# Patient Record
Sex: FEMALE | Race: WHITE | NOT HISPANIC OR LATINO | Employment: FULL TIME | URBAN - METROPOLITAN AREA
[De-identification: names, ages, dates, MRNs, and addresses within clinical notes are randomized per-mention and may not be internally consistent; named-entity substitution may affect disease eponyms.]

---

## 2020-12-22 ENCOUNTER — TRANSFERRED RECORDS (OUTPATIENT)
Dept: HEALTH INFORMATION MANAGEMENT | Facility: CLINIC | Age: 30
End: 2020-12-22

## 2020-12-22 LAB
ABORH_EXT (HISTORICAL CONVERSION): NORMAL
ANTIBODY_EXT (HISTORICAL CONVERSION): NORMAL
HBSAG_EXT (HISTORICAL CONVERSION): NORMAL
HGB_EXT (HISTORICAL CONVERSION): 13.4
HIV_EXT: NORMAL
PLT_EXT - HISTORICAL: 385
RPR - HISTORICAL: NORMAL
RUBELLA_EXT (HISTORICAL CONVERSION): NORMAL

## 2020-12-23 LAB
ABO (EXTERNAL): NORMAL
HEPATITIS B SURFACE ANTIGEN (EXTERNAL): NONREACTIVE
HIV1+2 AB SERPL QL IA: NONREACTIVE
RH (EXTERNAL): POSITIVE
RUBELLA ANTIBODY IGG (EXTERNAL): POSITIVE
TREPONEMA PALLIDUM ANTIBODY (EXTERNAL): NONREACTIVE

## 2021-01-20 ENCOUNTER — AMBULATORY - HEALTHEAST (OUTPATIENT)
Dept: MATERNAL FETAL MEDICINE | Facility: HOSPITAL | Age: 31
End: 2021-01-20

## 2021-01-20 ENCOUNTER — RECORDS - HEALTHEAST (OUTPATIENT)
Dept: ADMINISTRATIVE | Facility: OTHER | Age: 31
End: 2021-01-20

## 2021-01-20 DIAGNOSIS — O26.90 PREGNANCY, ANTEPARTUM, COMPLICATIONS: ICD-10-CM

## 2021-02-24 ENCOUNTER — AMBULATORY - HEALTHEAST (OUTPATIENT)
Dept: MATERNAL FETAL MEDICINE | Facility: HOSPITAL | Age: 31
End: 2021-02-24

## 2021-03-01 ENCOUNTER — OFFICE VISIT - HEALTHEAST (OUTPATIENT)
Dept: MATERNAL FETAL MEDICINE | Facility: HOSPITAL | Age: 31
End: 2021-03-01

## 2021-03-01 ENCOUNTER — RECORDS - HEALTHEAST (OUTPATIENT)
Dept: ULTRASOUND IMAGING | Facility: HOSPITAL | Age: 31
End: 2021-03-01

## 2021-03-01 ENCOUNTER — RECORDS - HEALTHEAST (OUTPATIENT)
Dept: ADMINISTRATIVE | Facility: OTHER | Age: 31
End: 2021-03-01

## 2021-03-01 DIAGNOSIS — O26.90 PREGNANCY RELATED CONDITIONS, UNSPECIFIED, UNSPECIFIED TRIMESTER: ICD-10-CM

## 2021-03-01 DIAGNOSIS — O35.9XX0 SUSPECTED FETAL ANOMALY, ANTEPARTUM: ICD-10-CM

## 2021-03-01 DIAGNOSIS — O99.210 OBESITY AFFECTING PREGNANCY, ANTEPARTUM: ICD-10-CM

## 2021-03-17 ENCOUNTER — RECORDS - HEALTHEAST (OUTPATIENT)
Dept: ADMINISTRATIVE | Facility: OTHER | Age: 31
End: 2021-03-17

## 2021-03-29 ENCOUNTER — OFFICE VISIT - HEALTHEAST (OUTPATIENT)
Dept: MATERNAL FETAL MEDICINE | Facility: HOSPITAL | Age: 31
End: 2021-03-29

## 2021-03-29 ENCOUNTER — RECORDS - HEALTHEAST (OUTPATIENT)
Dept: ULTRASOUND IMAGING | Facility: HOSPITAL | Age: 31
End: 2021-03-29

## 2021-03-29 ENCOUNTER — RECORDS - HEALTHEAST (OUTPATIENT)
Dept: ADMINISTRATIVE | Facility: OTHER | Age: 31
End: 2021-03-29

## 2021-03-29 DIAGNOSIS — O40.2XX0 POLYHYDRAMNIOS IN SECOND TRIMESTER, SINGLE OR UNSPECIFIED FETUS: ICD-10-CM

## 2021-03-29 DIAGNOSIS — O99.210 OBESITY AFFECTING PREGNANCY, ANTEPARTUM: ICD-10-CM

## 2021-03-29 DIAGNOSIS — O99.210 OBESITY COMPLICATING PREGNANCY, UNSPECIFIED TRIMESTER: ICD-10-CM

## 2021-03-29 DIAGNOSIS — O35.9XX0 MATERNAL CARE FOR (SUSPECTED) FETAL ABNORMALITY AND DAMAGE, UNSPECIFIED, NOT APPLICABLE OR UNSPECIFIED: ICD-10-CM

## 2021-05-30 ENCOUNTER — RECORDS - HEALTHEAST (OUTPATIENT)
Dept: ADMINISTRATIVE | Facility: CLINIC | Age: 31
End: 2021-05-30

## 2021-06-02 ENCOUNTER — RECORDS - HEALTHEAST (OUTPATIENT)
Dept: ADMINISTRATIVE | Facility: CLINIC | Age: 31
End: 2021-06-02

## 2021-06-17 ENCOUNTER — HOSPITAL ENCOUNTER (OUTPATIENT)
Dept: OBGYN | Facility: HOSPITAL | Age: 31
Discharge: HOME | End: 2021-06-17
Attending: ADVANCED PRACTICE MIDWIFE | Admitting: ADVANCED PRACTICE MIDWIFE

## 2021-06-17 DIAGNOSIS — O13.3 GESTATIONAL HYPERTENSION, THIRD TRIMESTER: ICD-10-CM

## 2021-06-17 LAB
ABO/RH(D): NORMAL
ALT SERPL W P-5'-P-CCNC: 21 U/L (ref 0–45)
ANTIBODY SCREEN: NEGATIVE
APTT PPP: 28 SECONDS (ref 24–37)
AST SERPL W P-5'-P-CCNC: 14 U/L (ref 0–40)
CREAT SERPL-MCNC: 0.63 MG/DL (ref 0.6–1.1)
CREAT UR-MCNC: 71.3 MG/DL
ERYTHROCYTE [DISTWIDTH] IN BLOOD BY AUTOMATED COUNT: 12.8 % (ref 11–14.5)
GFR SERPL CREATININE-BSD FRML MDRD: >60 ML/MIN/1.73M2
HCT VFR BLD AUTO: 34.7 % (ref 35–47)
HGB BLD-MCNC: 11.6 G/DL (ref 12–16)
INR PPP: 0.98 (ref 0.9–1.1)
MCH RBC QN AUTO: 30.3 PG (ref 27–34)
MCHC RBC AUTO-ENTMCNC: 33.4 G/DL (ref 32–36)
MCV RBC AUTO: 91 FL (ref 80–100)
PLATELET # BLD AUTO: 292 THOU/UL (ref 140–440)
PMV BLD AUTO: 8.8 FL (ref 8.5–12.5)
PROTEIN, RANDOM URINE - HISTORICAL: 10 MG/DL
PROTEIN/CREAT RATIO, RANDOM UR: 0.14
RBC # BLD AUTO: 3.83 MILL/UL (ref 3.8–5.4)
URATE SERPL-MCNC: 4.7 MG/DL (ref 2–7.5)
WBC: 10.8 THOU/UL (ref 4–11)

## 2021-06-17 RX ORDER — LABETALOL 100 MG/1
100 TABLET, FILM COATED ORAL 2 TIMES DAILY
Qty: 60 TABLET | Refills: 1 | Status: SHIPPED | OUTPATIENT
Start: 2021-06-17 | End: 2021-08-03

## 2021-06-17 RX ORDER — CETIRIZINE HYDROCHLORIDE 10 MG/1
10 TABLET ORAL DAILY
Status: SHIPPED | COMMUNITY
Start: 2021-06-17 | End: 2021-08-03

## 2021-06-17 ASSESSMENT — MIFFLIN-ST. JEOR: SCORE: 1796.01

## 2021-06-21 ENCOUNTER — HOSPITAL ENCOUNTER (OUTPATIENT)
Dept: OBGYN | Facility: HOSPITAL | Age: 31
Discharge: HOME | End: 2021-06-21
Attending: ADVANCED PRACTICE MIDWIFE | Admitting: ADVANCED PRACTICE MIDWIFE

## 2021-06-22 ENCOUNTER — HOSPITAL ENCOUNTER (OUTPATIENT)
Dept: OBGYN | Facility: HOSPITAL | Age: 31
Discharge: HOME | End: 2021-06-22
Attending: ADVANCED PRACTICE MIDWIFE | Admitting: ADVANCED PRACTICE MIDWIFE

## 2021-06-26 ENCOUNTER — HEALTH MAINTENANCE LETTER (OUTPATIENT)
Age: 31
End: 2021-06-26

## 2021-07-01 ENCOUNTER — MEDICAL CORRESPONDENCE (OUTPATIENT)
Dept: HEALTH INFORMATION MANAGEMENT | Facility: CLINIC | Age: 31
End: 2021-07-01

## 2021-07-01 ENCOUNTER — TRANSFERRED RECORDS (OUTPATIENT)
Dept: HEALTH INFORMATION MANAGEMENT | Facility: CLINIC | Age: 31
End: 2021-07-01

## 2021-07-04 LAB — GROUP B STREPTOCOCCUS (EXTERNAL): NEGATIVE

## 2021-07-08 ENCOUNTER — DOCUMENTATION ONLY (OUTPATIENT)
Dept: ADMINISTRATIVE | Facility: OTHER | Age: 31
End: 2021-07-08

## 2021-07-08 NOTE — PROGRESS NOTES
This encounter was created as part of manual pregnancy episode data conversion for the single EHR project. The following information (where applicable) was manually abstracted from the [employer] Epic instance on July 8, 2021: pregnancy episode name/date, dating, episode encounter linking, pregravid weight, number of fetuses, and pregnancy overview and plan.     Elke Alcantara RN   Clinical Informatics

## 2021-07-11 ENCOUNTER — HOSPITAL ENCOUNTER (INPATIENT)
Facility: HOSPITAL | Age: 31
LOS: 5 days | Discharge: HOME | End: 2021-07-16
Attending: ADVANCED PRACTICE MIDWIFE | Admitting: ADVANCED PRACTICE MIDWIFE
Payer: COMMERCIAL

## 2021-07-11 PROBLEM — O13.9 GESTATIONAL HYPERTENSION: Status: ACTIVE | Noted: 2021-07-11

## 2021-07-11 LAB
ABO/RH(D): NORMAL
ALBUMIN MFR UR ELPH: 30.2 MG/DL
ALT SERPL W P-5'-P-CCNC: <9 U/L (ref 0–45)
ANTIBODY SCREEN: NEGATIVE
AST SERPL W P-5'-P-CCNC: 22 U/L (ref 0–40)
CREAT SERPL-MCNC: 1.48 MG/DL (ref 0.6–1.1)
CREAT UR-MCNC: 230 MG/DL
ERYTHROCYTE [DISTWIDTH] IN BLOOD BY AUTOMATED COUNT: 17 % (ref 10–15)
GFR SERPL CREATININE-BSD FRML MDRD: 47 ML/MIN/1.73M2
HCT VFR BLD AUTO: 23.4 % (ref 35–47)
HGB BLD-MCNC: 7.7 G/DL (ref 11.7–15.7)
MCH RBC QN AUTO: 32.2 PG (ref 26.5–33)
MCHC RBC AUTO-ENTMCNC: 32.9 G/DL (ref 31.5–36.5)
MCV RBC AUTO: 98 FL (ref 78–100)
PLATELET # BLD AUTO: 113 10E3/UL (ref 150–450)
PROT/CREAT 24H UR: 0.13 MG/MG CR
RBC # BLD AUTO: 2.39 10E6/UL (ref 3.8–5.2)
SARS-COV-2 RNA RESP QL NAA+PROBE: NEGATIVE
SPECIMEN EXPIRATION DATE: NORMAL
WBC # BLD AUTO: 4.8 10E3/UL (ref 4–11)

## 2021-07-11 PROCEDURE — 36415 COLL VENOUS BLD VENIPUNCTURE: CPT | Performed by: ADVANCED PRACTICE MIDWIFE

## 2021-07-11 PROCEDURE — 86900 BLOOD TYPING SEROLOGIC ABO: CPT | Performed by: ADVANCED PRACTICE MIDWIFE

## 2021-07-11 PROCEDURE — 86780 TREPONEMA PALLIDUM: CPT | Performed by: ADVANCED PRACTICE MIDWIFE

## 2021-07-11 PROCEDURE — 85027 COMPLETE CBC AUTOMATED: CPT | Performed by: ADVANCED PRACTICE MIDWIFE

## 2021-07-11 PROCEDURE — 250N000013 HC RX MED GY IP 250 OP 250 PS 637: Performed by: ADVANCED PRACTICE MIDWIFE

## 2021-07-11 PROCEDURE — 82565 ASSAY OF CREATININE: CPT | Performed by: ADVANCED PRACTICE MIDWIFE

## 2021-07-11 PROCEDURE — 120N000001 HC R&B MED SURG/OB

## 2021-07-11 PROCEDURE — 84460 ALANINE AMINO (ALT) (SGPT): CPT | Performed by: ADVANCED PRACTICE MIDWIFE

## 2021-07-11 PROCEDURE — 84450 TRANSFERASE (AST) (SGOT): CPT | Performed by: ADVANCED PRACTICE MIDWIFE

## 2021-07-11 PROCEDURE — 87635 SARS-COV-2 COVID-19 AMP PRB: CPT | Performed by: ADVANCED PRACTICE MIDWIFE

## 2021-07-11 PROCEDURE — 84156 ASSAY OF PROTEIN URINE: CPT | Performed by: ADVANCED PRACTICE MIDWIFE

## 2021-07-11 RX ORDER — OXYTOCIN/0.9 % SODIUM CHLORIDE 30/500 ML
340 PLASTIC BAG, INJECTION (ML) INTRAVENOUS CONTINUOUS PRN
Status: DISCONTINUED | OUTPATIENT
Start: 2021-07-11 | End: 2021-07-14

## 2021-07-11 RX ORDER — SODIUM CHLORIDE, SODIUM LACTATE, POTASSIUM CHLORIDE, CALCIUM CHLORIDE 600; 310; 30; 20 MG/100ML; MG/100ML; MG/100ML; MG/100ML
10-125 INJECTION, SOLUTION INTRAVENOUS CONTINUOUS
Status: DISCONTINUED | OUTPATIENT
Start: 2021-07-11 | End: 2021-07-16 | Stop reason: HOSPADM

## 2021-07-11 RX ORDER — MORPHINE SULFATE 10 MG/ML
10 INJECTION, SOLUTION INTRAMUSCULAR; INTRAVENOUS
Status: DISCONTINUED | OUTPATIENT
Start: 2021-07-11 | End: 2021-07-14

## 2021-07-11 RX ORDER — MAGNESIUM SULFATE HEPTAHYDRATE 500 MG/ML
10 INJECTION, SOLUTION INTRAMUSCULAR; INTRAVENOUS
Status: DISCONTINUED | OUTPATIENT
Start: 2021-07-11 | End: 2021-07-16 | Stop reason: HOSPADM

## 2021-07-11 RX ORDER — ASPIRIN 81 MG/1
81 TABLET, CHEWABLE ORAL AT BEDTIME
Status: ON HOLD | COMMUNITY
End: 2021-07-16

## 2021-07-11 RX ORDER — NALOXONE HYDROCHLORIDE 0.4 MG/ML
0.2 INJECTION, SOLUTION INTRAMUSCULAR; INTRAVENOUS; SUBCUTANEOUS
Status: DISCONTINUED | OUTPATIENT
Start: 2021-07-11 | End: 2021-07-14

## 2021-07-11 RX ORDER — CARBOPROST TROMETHAMINE 250 UG/ML
250 INJECTION, SOLUTION INTRAMUSCULAR
Status: DISCONTINUED | OUTPATIENT
Start: 2021-07-11 | End: 2021-07-14

## 2021-07-11 RX ORDER — LORAZEPAM 2 MG/ML
2 INJECTION INTRAMUSCULAR
Status: DISCONTINUED | OUTPATIENT
Start: 2021-07-11 | End: 2021-07-16 | Stop reason: HOSPADM

## 2021-07-11 RX ORDER — NALOXONE HYDROCHLORIDE 0.4 MG/ML
0.4 INJECTION, SOLUTION INTRAMUSCULAR; INTRAVENOUS; SUBCUTANEOUS
Status: DISCONTINUED | OUTPATIENT
Start: 2021-07-11 | End: 2021-07-13

## 2021-07-11 RX ORDER — OXYTOCIN 10 [USP'U]/ML
10 INJECTION, SOLUTION INTRAMUSCULAR; INTRAVENOUS ONCE
Status: DISCONTINUED | OUTPATIENT
Start: 2021-07-11 | End: 2021-07-14

## 2021-07-11 RX ORDER — MISOPROSTOL 200 UG/1
400 TABLET ORAL PRN
Status: DISCONTINUED | OUTPATIENT
Start: 2021-07-11 | End: 2021-07-13

## 2021-07-11 RX ORDER — ONDANSETRON 4 MG/1
4 TABLET, ORALLY DISINTEGRATING ORAL EVERY 6 HOURS PRN
Status: DISCONTINUED | OUTPATIENT
Start: 2021-07-11 | End: 2021-07-13

## 2021-07-11 RX ORDER — MISOPROSTOL 100 UG/1
25 TABLET ORAL
Status: COMPLETED | OUTPATIENT
Start: 2021-07-11 | End: 2021-07-12

## 2021-07-11 RX ORDER — KETOROLAC TROMETHAMINE 30 MG/ML
30 INJECTION, SOLUTION INTRAMUSCULAR; INTRAVENOUS
Status: DISCONTINUED | OUTPATIENT
Start: 2021-07-11 | End: 2021-07-11

## 2021-07-11 RX ORDER — PROCHLORPERAZINE 25 MG
25 SUPPOSITORY, RECTAL RECTAL EVERY 12 HOURS PRN
Status: DISCONTINUED | OUTPATIENT
Start: 2021-07-11 | End: 2021-07-13

## 2021-07-11 RX ORDER — PRENATAL VIT/IRON FUM/FOLIC AC 27MG-0.8MG
1 TABLET ORAL DAILY
COMMUNITY

## 2021-07-11 RX ORDER — LABETALOL 100 MG/1
100 TABLET, FILM COATED ORAL EVERY 12 HOURS SCHEDULED
Status: DISCONTINUED | OUTPATIENT
Start: 2021-07-11 | End: 2021-07-14

## 2021-07-11 RX ORDER — MAGNESIUM SULFATE HEPTAHYDRATE 40 MG/ML
2 INJECTION, SOLUTION INTRAVENOUS
Status: DISCONTINUED | OUTPATIENT
Start: 2021-07-11 | End: 2021-07-16 | Stop reason: HOSPADM

## 2021-07-11 RX ORDER — NALOXONE HYDROCHLORIDE 0.4 MG/ML
0.2 INJECTION, SOLUTION INTRAMUSCULAR; INTRAVENOUS; SUBCUTANEOUS
Status: DISCONTINUED | OUTPATIENT
Start: 2021-07-11 | End: 2021-07-13

## 2021-07-11 RX ORDER — IBUPROFEN 600 MG/1
600 TABLET, FILM COATED ORAL
Status: DISCONTINUED | OUTPATIENT
Start: 2021-07-11 | End: 2021-07-11

## 2021-07-11 RX ORDER — HYDROXYZINE HYDROCHLORIDE 25 MG/1
50 TABLET, FILM COATED ORAL
Status: DISCONTINUED | OUTPATIENT
Start: 2021-07-11 | End: 2021-07-14

## 2021-07-11 RX ORDER — LABETALOL 100 MG/1
100 TABLET, FILM COATED ORAL 2 TIMES DAILY WITH MEALS
Status: ON HOLD | COMMUNITY
End: 2021-07-19

## 2021-07-11 RX ORDER — NALOXONE HYDROCHLORIDE 0.4 MG/ML
0.4 INJECTION, SOLUTION INTRAMUSCULAR; INTRAVENOUS; SUBCUTANEOUS
Status: DISCONTINUED | OUTPATIENT
Start: 2021-07-11 | End: 2021-07-14

## 2021-07-11 RX ORDER — CETIRIZINE HYDROCHLORIDE 10 MG/1
10 TABLET ORAL AT BEDTIME
COMMUNITY
End: 2021-08-03

## 2021-07-11 RX ORDER — METHYLERGONOVINE MALEATE 0.2 MG/ML
200 INJECTION INTRAVENOUS
Status: DISCONTINUED | OUTPATIENT
Start: 2021-07-11 | End: 2021-07-14

## 2021-07-11 RX ORDER — OXYTOCIN/0.9 % SODIUM CHLORIDE 30/500 ML
100-340 PLASTIC BAG, INJECTION (ML) INTRAVENOUS ONCE
Status: DISCONTINUED | OUTPATIENT
Start: 2021-07-11 | End: 2021-07-14

## 2021-07-11 RX ORDER — ONDANSETRON 2 MG/ML
4 INJECTION INTRAMUSCULAR; INTRAVENOUS EVERY 6 HOURS PRN
Status: DISCONTINUED | OUTPATIENT
Start: 2021-07-11 | End: 2021-07-14

## 2021-07-11 RX ORDER — MISOPROSTOL 200 UG/1
800 TABLET ORAL PRN
Status: DISCONTINUED | OUTPATIENT
Start: 2021-07-11 | End: 2021-07-14

## 2021-07-11 RX ORDER — PROCHLORPERAZINE MALEATE 10 MG
10 TABLET ORAL EVERY 6 HOURS PRN
Status: DISCONTINUED | OUTPATIENT
Start: 2021-07-11 | End: 2021-07-13

## 2021-07-11 RX ORDER — METOCLOPRAMIDE 10 MG/1
10 TABLET ORAL EVERY 6 HOURS PRN
Status: DISCONTINUED | OUTPATIENT
Start: 2021-07-11 | End: 2021-07-14

## 2021-07-11 RX ORDER — OXYTOCIN 10 [USP'U]/ML
10 INJECTION, SOLUTION INTRAMUSCULAR; INTRAVENOUS PRN
Status: DISCONTINUED | OUTPATIENT
Start: 2021-07-11 | End: 2021-07-14

## 2021-07-11 RX ORDER — MAGNESIUM SULFATE 4 G/50ML
4 INJECTION INTRAVENOUS
Status: DISCONTINUED | OUTPATIENT
Start: 2021-07-11 | End: 2021-07-16 | Stop reason: HOSPADM

## 2021-07-11 RX ORDER — METOCLOPRAMIDE HYDROCHLORIDE 5 MG/ML
10 INJECTION INTRAMUSCULAR; INTRAVENOUS EVERY 6 HOURS PRN
Status: DISCONTINUED | OUTPATIENT
Start: 2021-07-11 | End: 2021-07-13

## 2021-07-11 RX ADMIN — HYDROXYZINE HYDROCHLORIDE 50 MG: 25 TABLET, FILM COATED ORAL at 21:45

## 2021-07-11 RX ADMIN — Medication 25 MCG: at 11:24

## 2021-07-11 RX ADMIN — Medication 25 MCG: at 13:27

## 2021-07-11 RX ADMIN — Medication 25 MCG: at 21:45

## 2021-07-11 RX ADMIN — Medication 25 MCG: at 23:37

## 2021-07-11 RX ADMIN — Medication 25 MCG: at 09:20

## 2021-07-11 RX ADMIN — Medication 25 MCG: at 19:42

## 2021-07-11 RX ADMIN — LABETALOL HYDROCHLORIDE 100 MG: 100 TABLET, FILM COATED ORAL at 19:43

## 2021-07-11 RX ADMIN — Medication 25 MCG: at 17:29

## 2021-07-11 RX ADMIN — Medication 25 MCG: at 15:32

## 2021-07-11 ASSESSMENT — ACTIVITIES OF DAILY LIVING (ADL)
FALL_HISTORY_WITHIN_LAST_SIX_MONTHS: NO
TOILETING_ISSUES: NO

## 2021-07-11 ASSESSMENT — MIFFLIN-ST. JEOR: SCORE: 1788.64

## 2021-07-11 NOTE — PROGRESS NOTES
S: Sola is resting comfortably in bed. She denies headache, vision changes, URQ/epigastric pain.    /75   Pulse 100   Temp 98.3  F (36.8  C) (Oral)   Resp 18   Ht 1.524 m (5')   Wt 115.2 kg (254 lb)   LMP 10/25/2020   SpO2 97%   BMI 49.61 kg/m    Recent Results (from the past 24 hour(s))   Protein  random urine with Creat Ratio    Collection Time: 07/11/21  8:51 AM   Result Value Ref Range    Total Protein Urine mg/dL 30.2 mg/dL    Total Protein UR MG/MG CR 0.13 mg/mg Cr    Creatinine Urine mg/dL 230 mg/dL   SARS-COV2 (COVID-19) Virus RT-PCR    Collection Time: 07/11/21  8:53 AM    Specimen: Nasopharyngeal; Swab   Result Value Ref Range    SARS CoV2 PCR Negative Negative   CBC with platelets    Collection Time: 07/11/21  9:24 AM   Result Value Ref Range    WBC Count 4.8 4.0 - 11.0 10e3/uL    RBC Count 2.39 (L) 3.80 - 5.20 10e6/uL    Hemoglobin 7.7 (L) 11.7 - 15.7 g/dL    Hematocrit 23.4 (L) 35.0 - 47.0 %    MCV 98 78 - 100 fL    MCH 32.2 26.5 - 33.0 pg    MCHC 32.9 31.5 - 36.5 g/dL    RDW 17.0 (H) 10.0 - 15.0 %    Platelet Count 113 (L) 150 - 450 10e3/uL   AST    Collection Time: 07/11/21  9:24 AM   Result Value Ref Range    AST 22 0 - 40 U/L   ALT    Collection Time: 07/11/21  9:24 AM   Result Value Ref Range    ALT <9 0 - 45 U/L   Creatinine    Collection Time: 07/11/21  9:24 AM   Result Value Ref Range    Creatinine 1.48 (H) 0.60 - 1.10 mg/dL    GFR Estimate 47 (L) >60 mL/min/1.73m2   Adult Type and Screen    Collection Time: 07/11/21  9:24 AM   Result Value Ref Range    ABO/RH(D) A POS     Antibody Screen Negative Negative    SPECIMEN EXPIRATION DATE [id number]        A: IOL for medication-controlled gHTN and polyhydramnios  Low hemoglobin, hematocrit, and platelets; elevated creatinine, lower GFR    P: Reviewed above with Dr. Whaley, nothing further at this time, will carry on with cervical ripening and induction of labor. Will redraw HELLP panel tomorrow and monitor.    Dr. Whaley remains  available for consultation and collaboration as needed.    EVELIN Hawkins CNM

## 2021-07-11 NOTE — PROGRESS NOTES
Data: Patient admitted to room 14 at 0745. Patient is a . Prenatal record reviewed.   OB History    Para Term  AB Living   1 0 0 0 0 0   SAB TAB Ectopic Multiple Live Births   0 0 0 0 0      # Outcome Date GA Lbr Drake/2nd Weight Sex Delivery Anes PTL Lv   1 Current            .  Medical History:   Past Medical History:   Diagnosis Date     Gestational hypertension      PCOS (polycystic ovarian syndrome)    .  Gestational age 37w0d. Vital signs per doc flowsheet. Fetal movement present. Patient reports Induction Of Labor (for hypertension and hydramnios)   as reason for admission. Support persons  Francis Akers present.  Action: Verbal consent for EFM, external fetal monitors applied. Admission assessment completed. Patient and support persons educated on labor process. Patient instructed to report change in fetal movement, contractions, vaginal leaking of fluid or bleeding, abdominal pain, or any concerns related to the pregnancy to her nurse/physician. Patient oriented to room, call light in reach.   Response: JOSÉ ANTONIO Barber informed of patient's arrival. Plan per provider is oral cytotec for cervical ripening. Patient verbalized understanding of education and verbalized agreement with plan. Patient coping with labor, contractions mild and patient is unaware of them at this time.

## 2021-07-11 NOTE — PLAN OF CARE
Problem: Delayed Labor Progression (Labor)  Goal: Effective Progression to Delivery  Outcome: Improving   Oral Cytotec for induction of labor to achieve solis score greater than or equal to 8

## 2021-07-11 NOTE — PROGRESS NOTES
Elvira Melendez CNM in department.  Reviewed the following lab results:    Results for CLAU BHATT (MRN [id number]) as of 7/11/2021 13:53   Ref. Range 7/11/2021 09:24   WBC Latest Ref Range: 4.0 - 11.0 10e3/uL 4.8   Hemoglobin Latest Ref Range: 11.7 - 15.7 g/dL 7.7 (L)   Hematocrit Latest Ref Range: 35.0 - 47.0 % 23.4 (L)   Platelet Count Latest Ref Range: 150 - 450 10e3/uL 113 (L)   RBC Count Latest Ref Range: 3.80 - 5.20 10e6/uL 2.39 (L)   MCV Latest Ref Range: 78 - 100 fL 98   MCH Latest Ref Range: 26.5 - 33.0 pg 32.2   MCHC Latest Ref Range: 31.5 - 36.5 g/dL 32.9   RDW Latest Ref Range: 10.0 - 15.0 % 17.0 (H)   Creatinine Latest Ref Range: 0.60 - 1.10 mg/dL 1.48 (H)   GFR Estimate Latest Ref Range: >60 mL/min/1.73m2 47 (L)   ALT Latest Ref Range: 0 - 45 U/L <9   AST Latest Ref Range: 0 - 40 U/L 22     Results for CLAU BHATT (MRN [id number]) as of 7/11/2021 13:53   Ref. Range 7/11/2021 08:51   Total Protein UR MG/MG CR Latest Units: mg/mg Cr 0.13       No new orders at this time.

## 2021-07-12 LAB
ALBUMIN MFR UR ELPH: 8.8 MG/DL
ALT SERPL W P-5'-P-CCNC: 12 U/L (ref 0–45)
AST SERPL W P-5'-P-CCNC: 12 U/L (ref 0–40)
CREAT SERPL-MCNC: 0.64 MG/DL (ref 0.6–1.1)
CREAT UR-MCNC: 81 MG/DL
ERYTHROCYTE [DISTWIDTH] IN BLOOD BY AUTOMATED COUNT: 12.8 % (ref 10–15)
GFR SERPL CREATININE-BSD FRML MDRD: >90 ML/MIN/1.73M2
GROUP B STREPTOCOCCUS (EXTERNAL): NORMAL
HCT VFR BLD AUTO: 34.8 % (ref 35–47)
HGB BLD-MCNC: 11.6 G/DL (ref 11.7–15.7)
MCH RBC QN AUTO: 30.5 PG (ref 26.5–33)
MCHC RBC AUTO-ENTMCNC: 33.3 G/DL (ref 31.5–36.5)
MCV RBC AUTO: 92 FL (ref 78–100)
PLATELET # BLD AUTO: 279 10E3/UL (ref 150–450)
PROT/CREAT 24H UR: 0.11 MG/MG CR
RBC # BLD AUTO: 3.8 10E6/UL (ref 3.8–5.2)
T PALLIDUM AB SER QL: NEGATIVE
WBC # BLD AUTO: 11 10E3/UL (ref 4–11)

## 2021-07-12 PROCEDURE — 250N000013 HC RX MED GY IP 250 OP 250 PS 637: Performed by: MIDWIFE

## 2021-07-12 PROCEDURE — 84460 ALANINE AMINO (ALT) (SGPT): CPT | Performed by: ADVANCED PRACTICE MIDWIFE

## 2021-07-12 PROCEDURE — 36415 COLL VENOUS BLD VENIPUNCTURE: CPT | Performed by: ADVANCED PRACTICE MIDWIFE

## 2021-07-12 PROCEDURE — 250N000013 HC RX MED GY IP 250 OP 250 PS 637: Performed by: ADVANCED PRACTICE MIDWIFE

## 2021-07-12 PROCEDURE — 250N000013 HC RX MED GY IP 250 OP 250 PS 637: Performed by: OBSTETRICS & GYNECOLOGY

## 2021-07-12 PROCEDURE — 84156 ASSAY OF PROTEIN URINE: CPT | Performed by: ADVANCED PRACTICE MIDWIFE

## 2021-07-12 PROCEDURE — 120N000001 HC R&B MED SURG/OB

## 2021-07-12 PROCEDURE — 84450 TRANSFERASE (AST) (SGOT): CPT | Performed by: ADVANCED PRACTICE MIDWIFE

## 2021-07-12 PROCEDURE — 82565 ASSAY OF CREATININE: CPT | Performed by: ADVANCED PRACTICE MIDWIFE

## 2021-07-12 PROCEDURE — 85027 COMPLETE CBC AUTOMATED: CPT | Performed by: ADVANCED PRACTICE MIDWIFE

## 2021-07-12 RX ORDER — PRENATAL VIT/IRON FUM/FOLIC AC 27MG-0.8MG
1 TABLET ORAL DAILY
Status: DISCONTINUED | OUTPATIENT
Start: 2021-07-12 | End: 2021-07-16 | Stop reason: HOSPADM

## 2021-07-12 RX ORDER — CETIRIZINE HYDROCHLORIDE 10 MG/1
10 TABLET ORAL AT BEDTIME
Status: DISCONTINUED | OUTPATIENT
Start: 2021-07-12 | End: 2021-07-16 | Stop reason: HOSPADM

## 2021-07-12 RX ADMIN — LABETALOL HYDROCHLORIDE 100 MG: 100 TABLET, FILM COATED ORAL at 08:09

## 2021-07-12 RX ADMIN — DINOPROSTONE 10 MG: 10 INSERT VAGINAL at 11:52

## 2021-07-12 RX ADMIN — Medication 25 MCG: at 05:50

## 2021-07-12 RX ADMIN — Medication 25 MCG: at 08:09

## 2021-07-12 RX ADMIN — Medication 25 MCG: at 03:51

## 2021-07-12 RX ADMIN — PRENATAL VIT W/ FE FUMARATE-FA TAB 27-0.8 MG 1 TABLET: 27-0.8 TAB at 20:35

## 2021-07-12 RX ADMIN — Medication 25 MCG: at 01:48

## 2021-07-12 RX ADMIN — LABETALOL HYDROCHLORIDE 100 MG: 100 TABLET, FILM COATED ORAL at 20:31

## 2021-07-12 RX ADMIN — CETIRIZINE HYDROCHLORIDE 10 MG: 10 TABLET, FILM COATED ORAL at 20:31

## 2021-07-12 NOTE — PROGRESS NOTES
Labor Progress Note:        Assessment:     at 37w1d  IOL for GHTN      Plan:   -Routine CNM support & management. Encourage position changes, ambulation, rest as desired.  -OB cares managed by Dr. Aguilar secondary to severe preeclampsia.  Rn is in communication with Dr. Aguilar    Subjective:  Sola Akers is coping well IOL.  She received last dose of oral Cytotec at 0810 this morning for cervical ripening.  She is sitting up in a chair watching a movie with her spouse.  She reports feeling very well.  She denies HA, vision changes and/or RUQ pain.  Also  Denies dizziness, lightheadedness or SOB.  Endorses FM.  Denies LOF or bleeding. Couple conveys understanding regarding  plan of care.       Objective:  /86   Pulse 94   Temp 98.6  F (37  C) (Oral)   Resp 16   Ht 1.524 m (5')   Wt 115.2 kg (254 lb)   LMP 10/25/2020   SpO2 97%   BMI 49.61 kg/m      FHR:Baseline: 145 bpm, Variability: Moderate (6 - 25 bpm), Accelerations: none and Decelerations: Absent    Uterine contractions:TocoFrequency: Every 1.5-10  minutes, Duration: 40-60 seconds and Intensity: mild    SVE: deferred    Provider:  Mendy Joe CNM    Total time spent with patient:15 minutes, >50% time spent counseling and coordination of care.    Date:  2021  Time:  9:45 AM

## 2021-07-12 NOTE — PROGRESS NOTES
Chart reviewed. 31yr old  at 37+1wks admitted for IOL for cHTN on Labetalol BID and polyhydramnios.  Labs yesterday with multiple abnormal values:   :  Hgb 7.7             Platelets 113,000             Creatinine 1.48.      These were very different from her labs on 21 at [facility] when she was evaluated as an outpatient:    Labs :  Hgb 11.6                     Platelets 292,000                     Creatinine .63    Labs repeated this mornin/12:  Hgb 11.6            Platelets 279,000            Creatinine .64            Urine pr/cr .11    I suspect that labs from yesterday 21 were inaccurate or reported inaccurately; these were done the day of the Epic computer system changeover.    Her Bps have been stable, normal range.  She is feeling well, asymptomatic. I believe her labs from today are accurate.  Does not meet criteria for preeclampsia at this time.  Will continue induction for gHTN and poly.  S/p cytotec x 12doses, now with cervidil in place since noon.

## 2021-07-12 NOTE — PROGRESS NOTES
Update from Rn  Exam by Rn-  Internal os=dimple, external os 1cm/50%/-3.  Cervidil ordered per Dr. Aguilar was administered at noon. Mason =5.  PIH panel was normal this morning.  Ok per Dr. Aguilar for CNM to manage labor.  /86   Pulse 94   Temp 98.6  F (37  C) (Oral)   Resp 16   Ht 1.524 m (5')   Wt 115.2 kg (254 lb)   LMP 10/25/2020   SpO2 97%   BMI 49.61 kg/m     Results for orders placed or performed during the hospital encounter of 07/11/21 (from the past 24 hour(s))   CBC with platelets   Result Value Ref Range    WBC Count 11.0 4.0 - 11.0 10e3/uL    RBC Count 3.80 3.80 - 5.20 10e6/uL    Hemoglobin 11.6 (L) 11.7 - 15.7 g/dL    Hematocrit 34.8 (L) 35.0 - 47.0 %    MCV 92 78 - 100 fL    MCH 30.5 26.5 - 33.0 pg    MCHC 33.3 31.5 - 36.5 g/dL    RDW 12.8 10.0 - 15.0 %    Platelet Count 279 150 - 450 10e3/uL   AST   Result Value Ref Range    AST 12 0 - 40 U/L   ALT   Result Value Ref Range    ALT 12 0 - 45 U/L   Creatinine   Result Value Ref Range    Creatinine 0.64 0.60 - 1.10 mg/dL    GFR Estimate >90 >60 mL/min/1.73m2   ABO/Rh type and screen *Canceled*    Narrative    The following orders were created for panel order ABO/Rh type and screen.  Procedure                               Abnormality         Status                     ---------                               -----------         ------                       Please view results for these tests on the individual orders.   Protein  random urine with Creat Ratio   Result Value Ref Range    Total Protein Urine mg/dL 8.8 mg/dL    Total Protein UR MG/MG CR 0.11 mg/mg Cr    Creatinine Urine mg/dL 81 mg/dL    Narrative    The reference range has not been established for total protein, creatinine and the protein mg/mg creatinine  in random urine samples. The result should be integrated into the clinical context for interpretation.     The reference range has not been established for creatinine and the protein mg/mg creatinine in random urine samples.  The result should be integrated into the clinical context for interpretation.

## 2021-07-12 NOTE — PLAN OF CARE
Problem: Hypertensive Disorders in Pregnancy  Goal: Maternal-Fetal Stabilization  Outcome: Improving     Problem: Change in Fetal Wellbeing (Labor)  Goal: Stable Fetal Wellbeing  Outcome: Improving     BP stable this shift.  FHTs with moderate variability and accelerations.  Pt rested comfortably.

## 2021-07-13 ENCOUNTER — ANESTHESIA (OUTPATIENT)
Dept: OBGYN | Facility: HOSPITAL | Age: 31
End: 2021-07-13
Payer: COMMERCIAL

## 2021-07-13 ENCOUNTER — ANESTHESIA EVENT (OUTPATIENT)
Dept: OBGYN | Facility: HOSPITAL | Age: 31
End: 2021-07-13
Payer: COMMERCIAL

## 2021-07-13 LAB
ALBUMIN MFR UR ELPH: 11.8 MG/DL
ALT SERPL W P-5'-P-CCNC: 14 U/L (ref 0–45)
ANION GAP SERPL CALCULATED.3IONS-SCNC: 9 MMOL/L (ref 5–18)
APTT PPP: 27 SECONDS (ref 22–38)
AST SERPL W P-5'-P-CCNC: 10 U/L (ref 0–40)
BUN SERPL-MCNC: 6 MG/DL (ref 8–22)
CALCIUM SERPL-MCNC: 9.5 MG/DL (ref 8.5–10.5)
CHLORIDE BLD-SCNC: 107 MMOL/L (ref 98–107)
CO2 SERPL-SCNC: 22 MMOL/L (ref 22–31)
CREAT SERPL-MCNC: 0.63 MG/DL (ref 0.6–1.1)
CREAT SERPL-MCNC: 0.63 MG/DL (ref 0.6–1.1)
CREAT UR-MCNC: 94 MG/DL
ERYTHROCYTE [DISTWIDTH] IN BLOOD BY AUTOMATED COUNT: 12.8 % (ref 10–15)
GFR SERPL CREATININE-BSD FRML MDRD: >90 ML/MIN/1.73M2
GFR SERPL CREATININE-BSD FRML MDRD: >90 ML/MIN/1.73M2
GLUCOSE BLD-MCNC: 93 MG/DL (ref 70–125)
HCT VFR BLD AUTO: 37.9 % (ref 35–47)
HGB BLD-MCNC: 12.5 G/DL (ref 11.7–15.7)
INR PPP: 1.02 (ref 0.85–1.15)
MCH RBC QN AUTO: 30.3 PG (ref 26.5–33)
MCHC RBC AUTO-ENTMCNC: 33 G/DL (ref 31.5–36.5)
MCV RBC AUTO: 92 FL (ref 78–100)
PLATELET # BLD AUTO: 328 10E3/UL (ref 150–450)
POTASSIUM BLD-SCNC: 4.2 MMOL/L (ref 3.5–5)
PROT/CREAT 24H UR: 0.13 MG/MG CR
RBC # BLD AUTO: 4.13 10E6/UL (ref 3.8–5.2)
SODIUM SERPL-SCNC: 138 MMOL/L (ref 136–145)
WBC # BLD AUTO: 12.7 10E3/UL (ref 4–11)

## 2021-07-13 PROCEDURE — 85730 THROMBOPLASTIN TIME PARTIAL: CPT | Performed by: ADVANCED PRACTICE MIDWIFE

## 2021-07-13 PROCEDURE — 85027 COMPLETE CBC AUTOMATED: CPT | Performed by: ADVANCED PRACTICE MIDWIFE

## 2021-07-13 PROCEDURE — 85610 PROTHROMBIN TIME: CPT | Performed by: ADVANCED PRACTICE MIDWIFE

## 2021-07-13 PROCEDURE — 250N000011 HC RX IP 250 OP 636: Performed by: ANESTHESIOLOGY

## 2021-07-13 PROCEDURE — 250N000013 HC RX MED GY IP 250 OP 250 PS 637: Performed by: ADVANCED PRACTICE MIDWIFE

## 2021-07-13 PROCEDURE — 84156 ASSAY OF PROTEIN URINE: CPT | Performed by: ADVANCED PRACTICE MIDWIFE

## 2021-07-13 PROCEDURE — 80048 BASIC METABOLIC PNL TOTAL CA: CPT | Performed by: ADVANCED PRACTICE MIDWIFE

## 2021-07-13 PROCEDURE — 120N000001 HC R&B MED SURG/OB

## 2021-07-13 PROCEDURE — 250N000009 HC RX 250: Performed by: MIDWIFE

## 2021-07-13 PROCEDURE — 84460 ALANINE AMINO (ALT) (SGPT): CPT | Performed by: ADVANCED PRACTICE MIDWIFE

## 2021-07-13 PROCEDURE — 36415 COLL VENOUS BLD VENIPUNCTURE: CPT | Performed by: ADVANCED PRACTICE MIDWIFE

## 2021-07-13 PROCEDURE — 84450 TRANSFERASE (AST) (SGOT): CPT | Performed by: ADVANCED PRACTICE MIDWIFE

## 2021-07-13 PROCEDURE — 258N000003 HC RX IP 258 OP 636: Performed by: ADVANCED PRACTICE MIDWIFE

## 2021-07-13 PROCEDURE — 3E0R3BZ INTRODUCTION OF ANESTHETIC AGENT INTO SPINAL CANAL, PERCUTANEOUS APPROACH: ICD-10-PCS | Performed by: ANESTHESIOLOGY

## 2021-07-13 PROCEDURE — 00HU33Z INSERTION OF INFUSION DEVICE INTO SPINAL CANAL, PERCUTANEOUS APPROACH: ICD-10-PCS | Performed by: ANESTHESIOLOGY

## 2021-07-13 RX ORDER — ACETAMINOPHEN 325 MG/1
975 TABLET ORAL EVERY 6 HOURS
Status: DISCONTINUED | OUTPATIENT
Start: 2021-07-14 | End: 2021-07-14 | Stop reason: HOSPADM

## 2021-07-13 RX ORDER — ONDANSETRON 4 MG/1
4 TABLET, ORALLY DISINTEGRATING ORAL EVERY 6 HOURS PRN
Status: DISCONTINUED | OUTPATIENT
Start: 2021-07-13 | End: 2021-07-14

## 2021-07-13 RX ORDER — NALBUPHINE HYDROCHLORIDE 10 MG/ML
2.5-5 INJECTION, SOLUTION INTRAMUSCULAR; INTRAVENOUS; SUBCUTANEOUS EVERY 6 HOURS PRN
Status: DISCONTINUED | OUTPATIENT
Start: 2021-07-13 | End: 2021-07-14

## 2021-07-13 RX ORDER — BUPIVACAINE HYDROCHLORIDE 2.5 MG/ML
INJECTION, SOLUTION EPIDURAL; INFILTRATION; INTRACAUDAL
Status: COMPLETED | OUTPATIENT
Start: 2021-07-13 | End: 2021-07-14

## 2021-07-13 RX ORDER — ONDANSETRON 2 MG/ML
4 INJECTION INTRAMUSCULAR; INTRAVENOUS EVERY 6 HOURS PRN
Status: DISCONTINUED | OUTPATIENT
Start: 2021-07-13 | End: 2021-07-14

## 2021-07-13 RX ORDER — CEFAZOLIN SODIUM 2 G/100ML
2 INJECTION, SOLUTION INTRAVENOUS EVERY 8 HOURS
Status: DISCONTINUED | OUTPATIENT
Start: 2021-07-14 | End: 2021-07-14 | Stop reason: HOSPADM

## 2021-07-13 RX ORDER — LIDOCAINE 40 MG/G
CREAM TOPICAL
Status: DISCONTINUED | OUTPATIENT
Start: 2021-07-13 | End: 2021-07-13

## 2021-07-13 RX ORDER — OXYTOCIN/0.9 % SODIUM CHLORIDE 30/500 ML
1-24 PLASTIC BAG, INJECTION (ML) INTRAVENOUS CONTINUOUS
Status: DISCONTINUED | OUTPATIENT
Start: 2021-07-13 | End: 2021-07-14

## 2021-07-13 RX ORDER — EPHEDRINE SULFATE 50 MG/ML
5 INJECTION, SOLUTION INTRAMUSCULAR; INTRAVENOUS; SUBCUTANEOUS
Status: DISCONTINUED | OUTPATIENT
Start: 2021-07-13 | End: 2021-07-14

## 2021-07-13 RX ORDER — LABETALOL 100 MG/1
100 TABLET, FILM COATED ORAL 2 TIMES DAILY WITH MEALS
Status: DISCONTINUED | OUTPATIENT
Start: 2021-07-13 | End: 2021-07-13

## 2021-07-13 RX ADMIN — SODIUM CHLORIDE, POTASSIUM CHLORIDE, SODIUM LACTATE AND CALCIUM CHLORIDE 1000 ML: 600; 310; 30; 20 INJECTION, SOLUTION INTRAVENOUS at 16:54

## 2021-07-13 RX ADMIN — SODIUM CHLORIDE, POTASSIUM CHLORIDE, SODIUM LACTATE AND CALCIUM CHLORIDE 75 ML/HR: 600; 310; 30; 20 INJECTION, SOLUTION INTRAVENOUS at 11:52

## 2021-07-13 RX ADMIN — SODIUM CHLORIDE, POTASSIUM CHLORIDE, SODIUM LACTATE AND CALCIUM CHLORIDE 75 ML/HR: 600; 310; 30; 20 INJECTION, SOLUTION INTRAVENOUS at 09:21

## 2021-07-13 RX ADMIN — Medication 2 MILLI-UNITS/MIN: at 11:53

## 2021-07-13 RX ADMIN — SODIUM CHLORIDE, POTASSIUM CHLORIDE, SODIUM LACTATE AND CALCIUM CHLORIDE 125 ML/HR: 600; 310; 30; 20 INJECTION, SOLUTION INTRAVENOUS at 01:13

## 2021-07-13 RX ADMIN — Medication 10 ML/HR: at 22:38

## 2021-07-13 RX ADMIN — HYDROXYZINE HYDROCHLORIDE 50 MG: 25 TABLET, FILM COATED ORAL at 01:36

## 2021-07-13 RX ADMIN — LABETALOL HYDROCHLORIDE 100 MG: 100 TABLET, FILM COATED ORAL at 20:12

## 2021-07-13 RX ADMIN — SODIUM CHLORIDE, POTASSIUM CHLORIDE, SODIUM LACTATE AND CALCIUM CHLORIDE 125 ML/HR: 600; 310; 30; 20 INJECTION, SOLUTION INTRAVENOUS at 20:20

## 2021-07-13 RX ADMIN — Medication 2 MILLI-UNITS/MIN: at 01:14

## 2021-07-13 RX ADMIN — LABETALOL HYDROCHLORIDE 100 MG: 100 TABLET, FILM COATED ORAL at 08:10

## 2021-07-13 RX ADMIN — Medication 10 ML/HR: at 16:51

## 2021-07-13 RX ADMIN — SODIUM CHLORIDE, POTASSIUM CHLORIDE, SODIUM LACTATE AND CALCIUM CHLORIDE 125 ML/HR: 600; 310; 30; 20 INJECTION, SOLUTION INTRAVENOUS at 16:55

## 2021-07-13 RX ADMIN — BUPIVACAINE HYDROCHLORIDE 5 ML: 2.5 INJECTION, SOLUTION EPIDURAL; INFILTRATION; INTRACAUDAL at 16:51

## 2021-07-13 NOTE — PLAN OF CARE
Problem: Labor Pain (Labor)  Goal: Acceptable Pain Control  Outcome: Declining   Dr Jin at bedside at 1632 for labor epidural. Test dose given at 1644. Patient repositioned to right lateral @ 1650. Patient rating contractions at 0 out of 10.   Radha Fowler RN  7/13/2021 5:16 PM

## 2021-07-13 NOTE — PROGRESS NOTES
0000, Cervidil removed. Cervical recheck  the same.  0040, Mendy Joe cnm called for an update. Updated on removal of cervidil and the result of the cervical check. Order for low dose pitocin for cervical repining obtained.

## 2021-07-13 NOTE — PROGRESS NOTES
Update from Micky Macdonald.  Cervidil removed at approximately midnight. Cervix unchanged.  Recommend low-dose Pitocin at 2mu-6mu overnight for continued ripening. Max of 6 mu overnight.  Sleep meds encouraged.  Will re-evaluate in the a.m.

## 2021-07-13 NOTE — PLAN OF CARE
Problem: Adult Inpatient Plan of Care  Goal: Optimal Comfort and Wellbeing  Outcome: Improving     Problem: Labor Pain (Labor)  Goal: Acceptable Pain Control  7/13/2021 0213 by Juany James, RN  Outcome: Improving  7/13/2021 0210 by Juany James, RN  Outcome: Improving   Pt given vistaril 50mg orally and plan for pain management discussed.  Pt will notify writer if unable to sleep.

## 2021-07-13 NOTE — ANESTHESIA PROCEDURE NOTES
Epidural catheter Procedure Note  Pre-Procedure   Staff -        Anesthesiologist:  Antony Jin MD       Performed By: anesthesiologist       Location: OB       Procedure Start/Stop Times: 7/13/2021 4:32 PM and 7/13/2021 4:51 PM       Pre-Anesthestic Checklist: patient identified, IV checked, risks and benefits discussed, informed consent, monitors and equipment checked, pre-op evaluation, at physician/surgeon's request and post-op pain management  Timeout:       Correct Patient: Yes        Correct Procedure: Yes        Correct Site: Yes        Correct Position: Yes   Procedure Documentation  Procedure: epidural catheter       Diagnosis: labor pain       Patient Position: sitting       Skin prep: DuraPrep and Chloraprep       Local skin infiltrated with 3 mL of 1% lidocaine.        Insertion Site: T7-8, L2-3. (midline approach).       Technique: LORT saline        ELIJAH at 6 cm.       Needle Type: ToFatSkunky needle       Needle Gauge: 18.        Needle Length (Inches): 3.5        Catheter: 20 G.         Catheter threaded easily.         Threaded 12.5 cm at skin.         # of attempts: 1 and  # of redirects:  1    Assessment/Narrative         Paresthesias: No.       Test dose of 5 mL lidocaine 1.5% w/ 1:200,000 epinephrine at 16:44 CDT.         Test dose negative, 3 minutes after injection, for signs of intravascular, subdural, or intrathecal injection.       Insertion/Infusion Method: LORT saline       Aspiration negative for Heme or CSF via Epidural Catheter.    Medication(s) Administered   0.25% Bupivacaine PF (Epidural), 5 mL  Medication Administration Time: 7/13/2021 4:51 PM

## 2021-07-13 NOTE — PLAN OF CARE
Problem: Adult Inpatient Plan of Care  Goal: Optimal Comfort and Wellbeing  Outcome: No Change   Patient put call light on and RN into room. Patient states that she is not having any pain with the pitocin. Sierra MCCONNELL CNM updated and plan is to draw HELLP panel, shut off pitocin, let patient be off the monitor, and Sierra will come in and assess after she is done at [facility].   Radha Fowler RN  7/13/2021 10:04 AM

## 2021-07-13 NOTE — PROGRESS NOTES
0100, Plan to start low dose pitocin for cervical repining discussed with patient and significant other. Pt agreed to plan. Procedure discussed and questions answered.Peripheral line inserted with 18 g, good blood return obtained.  0114, Low dose pitocin started.   0136, Vistaril 50 mg orally given for sleep.  Will continue to monitor.

## 2021-07-13 NOTE — PROGRESS NOTES
S- comfortable sitting in chair. Not noting contractions. A bit frustrated labor did not ensue during the night. FOB at bedside and supportive  O- ./87   Pulse 96   Temp 98  F (36.7  C)   Resp 16   Ht 1.524 m (5')   Wt 115.2 kg (254 lb)   LMP 10/25/2020   SpO2 98%   BMI 49.61 kg/m     Ctx occ and mild  Cat 1 tracing  Cervix 3/75/-2- ROM for clear fluid  A- 37.2 IOL gHTN  P- Pitocin induction per protocol  Epid prn  Encourage position change  Monitor for s/s pre-e  Anticipate   Active management 3rd stage    Dr Bustos aware of pt status and  available for consultation/collaboration

## 2021-07-13 NOTE — PLAN OF CARE
Problem: Adult Inpatient Plan of Care  Goal: Plan of Care Review  Outcome: No Change  Flowsheets (Taken 7/12/2021 2022)  Plan of Care Reviewed With:   patient   spouse  Progress: no change     Problem: Adult Inpatient Plan of Care  Goal: Patient-Specific Goal (Individualized)  Outcome: Change based on patient need/priority  Flowsheets  Taken 7/12/2021 2022 by Tena Zapata RN  Individualized Care Needs: Remove Cervidil, and make a decision for further induction  Patient-Specific Goals (Include Timeframe): Pt will verbalize understanding of the plan to evaluate cervix after Cervidil removal and make a decision regarding continuation of cervical ripening vs. induction.  Taken 7/11/2021 0900 by Rosi Ny, RN  Anxieties, Fears or Concerns: none

## 2021-07-13 NOTE — ANESTHESIA PREPROCEDURE EVALUATION
Anesthesia Pre-Procedure Evaluation    Patient: Sola Akers   MRN: [id number] : 1990        Preoperative Diagnosis: * No surgery found *   Procedure :      Past Medical History:   Diagnosis Date     Gestational hypertension      PCOS (polycystic ovarian syndrome)       Past Surgical History:   Procedure Laterality Date     AS KNEE SCOPE, DIAGNOSTIC        Allergies   Allergen Reactions     Bee Venom Anaphylaxis     Seafood Anaphylaxis     Penicillins Rash     Sulfa Drugs Rash      Social History     Tobacco Use     Smoking status: Never Smoker     Smokeless tobacco: Never Used   Substance Use Topics     Alcohol use: Not Currently      Wt Readings from Last 1 Encounters:   21 115.2 kg (254 lb)        Anesthesia Evaluation   Pt has had prior anesthetic. Type: General.    No history of anesthetic complications       ROS/MED HX  ENT/Pulmonary:  - neg pulmonary ROS     Neurologic:       Cardiovascular:     (+) --PIH and BP Meds and Mild/mod --- (-) murmur and wheezes   METS/Exercise Tolerance:     Hematologic:  - neg hematologic  ROS     Musculoskeletal:       GI/Hepatic:  - neg GI/hepatic ROS     Renal/Genitourinary:       Endo:  - neg endo ROS   (+) Obesity (BMI 49.61),     Psychiatric/Substance Use:  - neg psychiatric ROS     Infectious Disease:       Malignancy:       Other:            Physical Exam    Airway        Mallampati: II   TM distance: > 3 FB   Neck ROM: full   Mouth opening: > 3 cm    Respiratory Devices and Support         Dental  no notable dental history         Cardiovascular   cardiovascular exam normal      (-) no murmur    Pulmonary   pulmonary exam normal        (-) no wheezes        OUTSIDE LABS:  CBC:   Lab Results   Component Value Date    WBC 12.7 (H) 2021    WBC 11.0 2021    HGB 12.5 2021    HGB 11.6 (L) 2021    HCT 37.9 2021    HCT 34.8 (L) 2021     2021     2021     BMP:   Lab Results   Component Value Date      07/13/2021    POTASSIUM 4.2 07/13/2021    CHLORIDE 107 07/13/2021    CO2 22 07/13/2021    BUN 6 (L) 07/13/2021    CR 0.63 07/13/2021    CR 0.63 07/13/2021    GLC 93 07/13/2021     COAGS:   Lab Results   Component Value Date    PTT 27 07/13/2021    INR 1.02 07/13/2021     POC: No results found for: BGM, HCG, HCGS  HEPATIC:   Lab Results   Component Value Date    ALT 14 07/13/2021    AST 10 07/13/2021     OTHER:   Lab Results   Component Value Date    ARLEEN 9.5 07/13/2021       Anesthesia Plan    ASA Status:  3      Anesthesia Type: Epidural.              Consents    Anesthesia Plan(s) and associated risks, benefits, and realistic alternatives discussed. Questions answered and patient/representative(s) expressed understanding.     - Discussed with:  Patient         Postoperative Care            Comments:           neg OB ROS.       Antony Jin MD

## 2021-07-14 ENCOUNTER — MEDICAL CORRESPONDENCE (OUTPATIENT)
Dept: HEALTH INFORMATION MANAGEMENT | Facility: CLINIC | Age: 31
End: 2021-07-14

## 2021-07-14 LAB
BASOPHILS # BLD AUTO: 0 10E3/UL (ref 0–0.2)
BASOPHILS NFR BLD AUTO: 0 %
CREAT SERPL-MCNC: 0.72 MG/DL (ref 0.6–1.1)
EOSINOPHIL # BLD AUTO: 0 10E3/UL (ref 0–0.7)
EOSINOPHIL NFR BLD AUTO: 0 %
ERYTHROCYTE [DISTWIDTH] IN BLOOD BY AUTOMATED COUNT: 12.7 % (ref 10–15)
GFR SERPL CREATININE-BSD FRML MDRD: >90 ML/MIN/1.73M2
HCT VFR BLD AUTO: 36.5 % (ref 35–47)
HGB BLD-MCNC: 12.2 G/DL (ref 11.7–15.7)
HOLD SPECIMEN: NORMAL
IMM GRANULOCYTES # BLD: 0.1 10E3/UL
IMM GRANULOCYTES NFR BLD: 1 %
LACTATE SERPL-SCNC: 2.1 MMOL/L (ref 0.7–2)
LYMPHOCYTES # BLD AUTO: 1.6 10E3/UL (ref 0.8–5.3)
LYMPHOCYTES NFR BLD AUTO: 8 %
MCH RBC QN AUTO: 30.7 PG (ref 26.5–33)
MCHC RBC AUTO-ENTMCNC: 33.4 G/DL (ref 31.5–36.5)
MCV RBC AUTO: 92 FL (ref 78–100)
MONOCYTES # BLD AUTO: 0.9 10E3/UL (ref 0–1.3)
MONOCYTES NFR BLD AUTO: 5 %
NEUTROPHILS # BLD AUTO: 16.6 10E3/UL (ref 1.6–8.3)
NEUTROPHILS NFR BLD AUTO: 86 %
NRBC # BLD AUTO: 0 10E3/UL
NRBC BLD AUTO-RTO: 0 /100
PLATELET # BLD AUTO: 308 10E3/UL (ref 150–450)
RBC # BLD AUTO: 3.98 10E6/UL (ref 3.8–5.2)
WBC # BLD AUTO: 19.1 10E3/UL (ref 4–11)

## 2021-07-14 PROCEDURE — 999N000249 HC STATISTIC C-SECTION ON UNIT

## 2021-07-14 PROCEDURE — 250N000011 HC RX IP 250 OP 636: Performed by: ANESTHESIOLOGY

## 2021-07-14 PROCEDURE — 83605 ASSAY OF LACTIC ACID: CPT | Performed by: OBSTETRICS & GYNECOLOGY

## 2021-07-14 PROCEDURE — 250N000011 HC RX IP 250 OP 636: Performed by: NURSE ANESTHETIST, CERTIFIED REGISTERED

## 2021-07-14 PROCEDURE — 250N000013 HC RX MED GY IP 250 OP 250 PS 637: Performed by: OBSTETRICS & GYNECOLOGY

## 2021-07-14 PROCEDURE — 88307 TISSUE EXAM BY PATHOLOGIST: CPT | Mod: TC | Performed by: ADVANCED PRACTICE MIDWIFE

## 2021-07-14 PROCEDURE — 36415 COLL VENOUS BLD VENIPUNCTURE: CPT | Performed by: OBSTETRICS & GYNECOLOGY

## 2021-07-14 PROCEDURE — 250N000011 HC RX IP 250 OP 636: Performed by: OBSTETRICS & GYNECOLOGY

## 2021-07-14 PROCEDURE — 258N000003 HC RX IP 258 OP 636: Performed by: NURSE ANESTHETIST, CERTIFIED REGISTERED

## 2021-07-14 PROCEDURE — 250N000013 HC RX MED GY IP 250 OP 250 PS 637: Performed by: ADVANCED PRACTICE MIDWIFE

## 2021-07-14 PROCEDURE — 258N000003 HC RX IP 258 OP 636: Performed by: ADVANCED PRACTICE MIDWIFE

## 2021-07-14 PROCEDURE — 999N000249 HC STATISTIC C-SECTION ON UNIT: Performed by: OBSTETRICS & GYNECOLOGY

## 2021-07-14 PROCEDURE — 36415 COLL VENOUS BLD VENIPUNCTURE: CPT | Performed by: ADVANCED PRACTICE MIDWIFE

## 2021-07-14 PROCEDURE — 120N000001 HC R&B MED SURG/OB

## 2021-07-14 PROCEDURE — 88307 TISSUE EXAM BY PATHOLOGIST: CPT | Mod: 26 | Performed by: PATHOLOGY

## 2021-07-14 PROCEDURE — 10907ZC DRAINAGE OF AMNIOTIC FLUID, THERAPEUTIC FROM PRODUCTS OF CONCEPTION, VIA NATURAL OR ARTIFICIAL OPENING: ICD-10-PCS | Performed by: SURGERY

## 2021-07-14 PROCEDURE — 999N000157 HC STATISTIC RCP TIME EA 10 MIN

## 2021-07-14 PROCEDURE — 250N000013 HC RX MED GY IP 250 OP 250 PS 637: Performed by: MIDWIFE

## 2021-07-14 PROCEDURE — 250N000009 HC RX 250: Performed by: OBSTETRICS & GYNECOLOGY

## 2021-07-14 PROCEDURE — 258N000003 HC RX IP 258 OP 636: Performed by: OBSTETRICS & GYNECOLOGY

## 2021-07-14 PROCEDURE — 370N000017 HC ANESTHESIA TECHNICAL FEE, PER MIN: Performed by: OBSTETRICS & GYNECOLOGY

## 2021-07-14 PROCEDURE — 360N000076 HC SURGERY LEVEL 3, PER MIN: Performed by: OBSTETRICS & GYNECOLOGY

## 2021-07-14 PROCEDURE — 272N000001 HC OR GENERAL SUPPLY STERILE: Performed by: OBSTETRICS & GYNECOLOGY

## 2021-07-14 PROCEDURE — 250N000011 HC RX IP 250 OP 636: Performed by: ADVANCED PRACTICE MIDWIFE

## 2021-07-14 PROCEDURE — 250N000009 HC RX 250: Performed by: NURSE ANESTHETIST, CERTIFIED REGISTERED

## 2021-07-14 PROCEDURE — 82565 ASSAY OF CREATININE: CPT | Performed by: ADVANCED PRACTICE MIDWIFE

## 2021-07-14 PROCEDURE — 85025 COMPLETE CBC W/AUTO DIFF WBC: CPT | Performed by: ADVANCED PRACTICE MIDWIFE

## 2021-07-14 RX ORDER — ONDANSETRON 4 MG/1
4 TABLET, ORALLY DISINTEGRATING ORAL EVERY 6 HOURS PRN
Status: DISCONTINUED | OUTPATIENT
Start: 2021-07-14 | End: 2021-07-14 | Stop reason: HOSPADM

## 2021-07-14 RX ORDER — LIDOCAINE 40 MG/G
CREAM TOPICAL
Status: DISCONTINUED | OUTPATIENT
Start: 2021-07-14 | End: 2021-07-16 | Stop reason: HOSPADM

## 2021-07-14 RX ORDER — PHENYLEPHRINE HYDROCHLORIDE 10 MG/ML
INJECTION INTRAVENOUS PRN
Status: DISCONTINUED | OUTPATIENT
Start: 2021-07-14 | End: 2021-07-14

## 2021-07-14 RX ORDER — METOCLOPRAMIDE HYDROCHLORIDE 5 MG/ML
10 INJECTION INTRAMUSCULAR; INTRAVENOUS EVERY 6 HOURS PRN
Status: DISCONTINUED | OUTPATIENT
Start: 2021-07-14 | End: 2021-07-16 | Stop reason: HOSPADM

## 2021-07-14 RX ORDER — MISOPROSTOL 200 UG/1
800 TABLET ORAL PRN
Status: DISCONTINUED | OUTPATIENT
Start: 2021-07-14 | End: 2021-07-16 | Stop reason: HOSPADM

## 2021-07-14 RX ORDER — MORPHINE SULFATE 0.5 MG/ML
INJECTION, SOLUTION EPIDURAL; INTRATHECAL; INTRAVENOUS PRN
Status: DISCONTINUED | OUTPATIENT
Start: 2021-07-14 | End: 2021-07-14

## 2021-07-14 RX ORDER — ONDANSETRON 2 MG/ML
8 INJECTION INTRAMUSCULAR; INTRAVENOUS ONCE
Status: DISCONTINUED | OUTPATIENT
Start: 2021-07-14 | End: 2021-07-16 | Stop reason: HOSPADM

## 2021-07-14 RX ORDER — LIDOCAINE 40 MG/G
CREAM TOPICAL
Status: DISCONTINUED | OUTPATIENT
Start: 2021-07-14 | End: 2021-07-14

## 2021-07-14 RX ORDER — CARBOPROST TROMETHAMINE 250 UG/ML
250 INJECTION, SOLUTION INTRAMUSCULAR
Status: DISCONTINUED | OUTPATIENT
Start: 2021-07-14 | End: 2021-07-14

## 2021-07-14 RX ORDER — SIMETHICONE 80 MG
80 TABLET,CHEWABLE ORAL 4 TIMES DAILY PRN
Status: DISCONTINUED | OUTPATIENT
Start: 2021-07-14 | End: 2021-07-16 | Stop reason: HOSPADM

## 2021-07-14 RX ORDER — AMOXICILLIN 250 MG
1 CAPSULE ORAL 2 TIMES DAILY
Status: DISCONTINUED | OUTPATIENT
Start: 2021-07-14 | End: 2021-07-16 | Stop reason: HOSPADM

## 2021-07-14 RX ORDER — OXYCODONE HYDROCHLORIDE 5 MG/1
5 TABLET ORAL EVERY 4 HOURS PRN
Status: DISCONTINUED | OUTPATIENT
Start: 2021-07-14 | End: 2021-07-16 | Stop reason: HOSPADM

## 2021-07-14 RX ORDER — ONDANSETRON 2 MG/ML
4 INJECTION INTRAMUSCULAR; INTRAVENOUS EVERY 6 HOURS PRN
Status: DISCONTINUED | OUTPATIENT
Start: 2021-07-14 | End: 2021-07-16 | Stop reason: HOSPADM

## 2021-07-14 RX ORDER — METOCLOPRAMIDE 10 MG/1
10 TABLET ORAL EVERY 6 HOURS PRN
Status: DISCONTINUED | OUTPATIENT
Start: 2021-07-14 | End: 2021-07-16 | Stop reason: HOSPADM

## 2021-07-14 RX ORDER — SODIUM CHLORIDE, SODIUM LACTATE, POTASSIUM CHLORIDE, CALCIUM CHLORIDE 600; 310; 30; 20 MG/100ML; MG/100ML; MG/100ML; MG/100ML
INJECTION, SOLUTION INTRAVENOUS CONTINUOUS
Status: DISCONTINUED | OUTPATIENT
Start: 2021-07-14 | End: 2021-07-14

## 2021-07-14 RX ORDER — ONDANSETRON 2 MG/ML
4 INJECTION INTRAMUSCULAR; INTRAVENOUS EVERY 30 MIN PRN
Status: DISCONTINUED | OUTPATIENT
Start: 2021-07-14 | End: 2021-07-16 | Stop reason: HOSPADM

## 2021-07-14 RX ORDER — OXYTOCIN/0.9 % SODIUM CHLORIDE 30/500 ML
100-340 PLASTIC BAG, INJECTION (ML) INTRAVENOUS ONCE
Status: COMPLETED | OUTPATIENT
Start: 2021-07-14 | End: 2021-07-14

## 2021-07-14 RX ORDER — HYDROMORPHONE HYDROCHLORIDE 1 MG/ML
0.4 INJECTION, SOLUTION INTRAMUSCULAR; INTRAVENOUS; SUBCUTANEOUS EVERY 5 MIN PRN
Status: ACTIVE | OUTPATIENT
Start: 2021-07-14 | End: 2021-07-14

## 2021-07-14 RX ORDER — MORPHINE SULFATE 1 MG/ML
2 INJECTION, SOLUTION EPIDURAL; INTRATHECAL; INTRAVENOUS ONCE
Status: DISCONTINUED | OUTPATIENT
Start: 2021-07-14 | End: 2021-07-14 | Stop reason: HOSPADM

## 2021-07-14 RX ORDER — AMOXICILLIN 250 MG
2 CAPSULE ORAL 2 TIMES DAILY
Status: DISCONTINUED | OUTPATIENT
Start: 2021-07-14 | End: 2021-07-16 | Stop reason: HOSPADM

## 2021-07-14 RX ORDER — ACETAMINOPHEN 325 MG/1
975 TABLET ORAL EVERY 6 HOURS
Status: DISCONTINUED | OUTPATIENT
Start: 2021-07-14 | End: 2021-07-16 | Stop reason: HOSPADM

## 2021-07-14 RX ORDER — FENTANYL CITRATE 50 UG/ML
50 INJECTION, SOLUTION INTRAMUSCULAR; INTRAVENOUS EVERY 5 MIN PRN
Status: ACTIVE | OUTPATIENT
Start: 2021-07-14 | End: 2021-07-14

## 2021-07-14 RX ORDER — MODIFIED LANOLIN
OINTMENT (GRAM) TOPICAL
Status: DISCONTINUED | OUTPATIENT
Start: 2021-07-14 | End: 2021-07-16 | Stop reason: HOSPADM

## 2021-07-14 RX ORDER — METHYLERGONOVINE MALEATE 0.2 MG/ML
200 INJECTION INTRAVENOUS
Status: DISCONTINUED | OUTPATIENT
Start: 2021-07-14 | End: 2021-07-16 | Stop reason: HOSPADM

## 2021-07-14 RX ORDER — MISOPROSTOL 200 UG/1
800 TABLET ORAL PRN
Status: DISCONTINUED | OUTPATIENT
Start: 2021-07-14 | End: 2021-07-14

## 2021-07-14 RX ORDER — CITRIC ACID/SODIUM CITRATE 334-500MG
30 SOLUTION, ORAL ORAL
Status: DISCONTINUED | OUTPATIENT
Start: 2021-07-14 | End: 2021-07-14

## 2021-07-14 RX ORDER — FENTANYL CITRATE 50 UG/ML
INJECTION, SOLUTION INTRAMUSCULAR; INTRAVENOUS PRN
Status: DISCONTINUED | OUTPATIENT
Start: 2021-07-14 | End: 2021-07-14

## 2021-07-14 RX ORDER — DEXTROSE, SODIUM CHLORIDE, SODIUM LACTATE, POTASSIUM CHLORIDE, AND CALCIUM CHLORIDE 5; .6; .31; .03; .02 G/100ML; G/100ML; G/100ML; G/100ML; G/100ML
INJECTION, SOLUTION INTRAVENOUS CONTINUOUS
Status: DISCONTINUED | OUTPATIENT
Start: 2021-07-14 | End: 2021-07-16 | Stop reason: HOSPADM

## 2021-07-14 RX ORDER — OXYTOCIN/0.9 % SODIUM CHLORIDE 30/500 ML
340 PLASTIC BAG, INJECTION (ML) INTRAVENOUS CONTINUOUS PRN
Status: DISCONTINUED | OUTPATIENT
Start: 2021-07-14 | End: 2021-07-16 | Stop reason: HOSPADM

## 2021-07-14 RX ORDER — IBUPROFEN 800 MG/1
800 TABLET, FILM COATED ORAL EVERY 6 HOURS
Status: DISCONTINUED | OUTPATIENT
Start: 2021-07-15 | End: 2021-07-14

## 2021-07-14 RX ORDER — ONDANSETRON 2 MG/ML
4 INJECTION INTRAMUSCULAR; INTRAVENOUS EVERY 6 HOURS PRN
Status: DISCONTINUED | OUTPATIENT
Start: 2021-07-14 | End: 2021-07-14 | Stop reason: HOSPADM

## 2021-07-14 RX ORDER — IBUPROFEN 800 MG/1
800 TABLET, FILM COATED ORAL EVERY 6 HOURS
Status: DISCONTINUED | OUTPATIENT
Start: 2021-07-14 | End: 2021-07-16 | Stop reason: HOSPADM

## 2021-07-14 RX ORDER — PROCHLORPERAZINE 25 MG
25 SUPPOSITORY, RECTAL RECTAL EVERY 12 HOURS PRN
Status: DISCONTINUED | OUTPATIENT
Start: 2021-07-14 | End: 2021-07-16 | Stop reason: HOSPADM

## 2021-07-14 RX ORDER — BISACODYL 10 MG
10 SUPPOSITORY, RECTAL RECTAL DAILY PRN
Status: DISCONTINUED | OUTPATIENT
Start: 2021-07-16 | End: 2021-07-16 | Stop reason: HOSPADM

## 2021-07-14 RX ORDER — MISOPROSTOL 200 UG/1
400 TABLET ORAL PRN
Status: DISCONTINUED | OUTPATIENT
Start: 2021-07-14 | End: 2021-07-14

## 2021-07-14 RX ORDER — OXYTOCIN 10 [USP'U]/ML
10 INJECTION, SOLUTION INTRAMUSCULAR; INTRAVENOUS ONCE
Status: COMPLETED | OUTPATIENT
Start: 2021-07-14 | End: 2021-07-14

## 2021-07-14 RX ORDER — LIDOCAINE HCL/EPINEPHRINE/PF 2%-1:200K
VIAL (ML) INJECTION PRN
Status: DISCONTINUED | OUTPATIENT
Start: 2021-07-14 | End: 2021-07-14

## 2021-07-14 RX ORDER — ONDANSETRON 4 MG/1
4 TABLET, ORALLY DISINTEGRATING ORAL EVERY 30 MIN PRN
Status: DISCONTINUED | OUTPATIENT
Start: 2021-07-14 | End: 2021-07-16 | Stop reason: HOSPADM

## 2021-07-14 RX ORDER — OXYTOCIN 10 [USP'U]/ML
10 INJECTION, SOLUTION INTRAMUSCULAR; INTRAVENOUS PRN
Status: DISCONTINUED | OUTPATIENT
Start: 2021-07-14 | End: 2021-07-16 | Stop reason: HOSPADM

## 2021-07-14 RX ORDER — CEFAZOLIN SODIUM 2 G/100ML
2 INJECTION, SOLUTION INTRAVENOUS ONCE
Status: COMPLETED | OUTPATIENT
Start: 2021-07-14 | End: 2021-07-14

## 2021-07-14 RX ORDER — CEFAZOLIN SODIUM 2 G/100ML
2 INJECTION, SOLUTION INTRAVENOUS SEE ADMIN INSTRUCTIONS
Status: DISCONTINUED | OUTPATIENT
Start: 2021-07-14 | End: 2021-07-14

## 2021-07-14 RX ORDER — CEFAZOLIN SODIUM 2 G/100ML
2 INJECTION, SOLUTION INTRAVENOUS
Status: DISCONTINUED | OUTPATIENT
Start: 2021-07-14 | End: 2021-07-14

## 2021-07-14 RX ORDER — LIDOCAINE 40 MG/G
CREAM TOPICAL
Status: DISCONTINUED | OUTPATIENT
Start: 2021-07-14 | End: 2021-07-14 | Stop reason: HOSPADM

## 2021-07-14 RX ORDER — ONDANSETRON 4 MG/1
4 TABLET, ORALLY DISINTEGRATING ORAL EVERY 6 HOURS PRN
Status: DISCONTINUED | OUTPATIENT
Start: 2021-07-14 | End: 2021-07-16 | Stop reason: HOSPADM

## 2021-07-14 RX ORDER — OXYTOCIN/0.9 % SODIUM CHLORIDE 30/500 ML
340 PLASTIC BAG, INJECTION (ML) INTRAVENOUS CONTINUOUS PRN
Status: DISCONTINUED | OUTPATIENT
Start: 2021-07-14 | End: 2021-07-14

## 2021-07-14 RX ORDER — SODIUM CHLORIDE, SODIUM LACTATE, POTASSIUM CHLORIDE, CALCIUM CHLORIDE 600; 310; 30; 20 MG/100ML; MG/100ML; MG/100ML; MG/100ML
INJECTION, SOLUTION INTRAVENOUS CONTINUOUS
Status: DISCONTINUED | OUTPATIENT
Start: 2021-07-14 | End: 2021-07-16 | Stop reason: HOSPADM

## 2021-07-14 RX ORDER — MISOPROSTOL 200 UG/1
400 TABLET ORAL PRN
Status: DISCONTINUED | OUTPATIENT
Start: 2021-07-14 | End: 2021-07-16 | Stop reason: HOSPADM

## 2021-07-14 RX ORDER — EPHEDRINE SULFATE 50 MG/ML
5 INJECTION, SOLUTION INTRAMUSCULAR; INTRAVENOUS; SUBCUTANEOUS
Status: DISCONTINUED | OUTPATIENT
Start: 2021-07-14 | End: 2021-07-14 | Stop reason: HOSPADM

## 2021-07-14 RX ORDER — NALBUPHINE HYDROCHLORIDE 10 MG/ML
2.5-5 INJECTION, SOLUTION INTRAMUSCULAR; INTRAVENOUS; SUBCUTANEOUS EVERY 6 HOURS PRN
Status: DISCONTINUED | OUTPATIENT
Start: 2021-07-14 | End: 2021-07-16 | Stop reason: HOSPADM

## 2021-07-14 RX ORDER — HALOPERIDOL 5 MG/ML
1 INJECTION INTRAMUSCULAR
Status: DISCONTINUED | OUTPATIENT
Start: 2021-07-14 | End: 2021-07-16 | Stop reason: HOSPADM

## 2021-07-14 RX ORDER — HYDROCORTISONE 2.5 %
CREAM (GRAM) TOPICAL 3 TIMES DAILY PRN
Status: DISCONTINUED | OUTPATIENT
Start: 2021-07-14 | End: 2021-07-16 | Stop reason: HOSPADM

## 2021-07-14 RX ORDER — OXYTOCIN 10 [USP'U]/ML
10 INJECTION, SOLUTION INTRAMUSCULAR; INTRAVENOUS PRN
Status: DISCONTINUED | OUTPATIENT
Start: 2021-07-14 | End: 2021-07-14

## 2021-07-14 RX ORDER — KETOROLAC TROMETHAMINE 30 MG/ML
30 INJECTION, SOLUTION INTRAMUSCULAR; INTRAVENOUS EVERY 6 HOURS
Status: DISCONTINUED | OUTPATIENT
Start: 2021-07-14 | End: 2021-07-14

## 2021-07-14 RX ORDER — METHYLERGONOVINE MALEATE 0.2 MG/ML
200 INJECTION INTRAVENOUS
Status: DISCONTINUED | OUTPATIENT
Start: 2021-07-14 | End: 2021-07-14

## 2021-07-14 RX ORDER — PROCHLORPERAZINE MALEATE 10 MG
10 TABLET ORAL EVERY 6 HOURS PRN
Status: DISCONTINUED | OUTPATIENT
Start: 2021-07-14 | End: 2021-07-16 | Stop reason: HOSPADM

## 2021-07-14 RX ORDER — KETOROLAC TROMETHAMINE 30 MG/ML
INJECTION, SOLUTION INTRAMUSCULAR; INTRAVENOUS PRN
Status: DISCONTINUED | OUTPATIENT
Start: 2021-07-14 | End: 2021-07-14

## 2021-07-14 RX ORDER — CARBOPROST TROMETHAMINE 250 UG/ML
250 INJECTION, SOLUTION INTRAMUSCULAR
Status: DISCONTINUED | OUTPATIENT
Start: 2021-07-14 | End: 2021-07-16 | Stop reason: HOSPADM

## 2021-07-14 RX ADMIN — PHENYLEPHRINE HYDROCHLORIDE 100 MCG: 10 INJECTION INTRAVENOUS at 05:10

## 2021-07-14 RX ADMIN — SODIUM CHLORIDE, POTASSIUM CHLORIDE, SODIUM LACTATE AND CALCIUM CHLORIDE: 600; 310; 30; 20 INJECTION, SOLUTION INTRAVENOUS at 04:13

## 2021-07-14 RX ADMIN — DOCUSATE SODIUM 50 MG AND SENNOSIDES 8.6 MG 2 TABLET: 8.6; 5 TABLET, FILM COATED ORAL at 11:42

## 2021-07-14 RX ADMIN — ACETAMINOPHEN 975 MG: 325 TABLET ORAL at 00:11

## 2021-07-14 RX ADMIN — ENOXAPARIN SODIUM 40 MG: 40 INJECTION SUBCUTANEOUS at 16:48

## 2021-07-14 RX ADMIN — CEFAZOLIN SODIUM 2 G: 2 INJECTION, SOLUTION INTRAVENOUS at 00:13

## 2021-07-14 RX ADMIN — ACETAMINOPHEN 975 MG: 325 TABLET ORAL at 20:40

## 2021-07-14 RX ADMIN — IBUPROFEN 800 MG: 800 TABLET, FILM COATED ORAL at 18:23

## 2021-07-14 RX ADMIN — GENTAMICIN SULFATE 170 MG: 40 INJECTION, SOLUTION INTRAMUSCULAR; INTRAVENOUS at 09:37

## 2021-07-14 RX ADMIN — Medication 100 ML/HR: at 10:16

## 2021-07-14 RX ADMIN — PHENYLEPHRINE HYDROCHLORIDE 0.2 MCG/KG/MIN: 10 INJECTION INTRAVENOUS at 04:14

## 2021-07-14 RX ADMIN — FENTANYL CITRATE 100 MCG: 50 INJECTION, SOLUTION INTRAMUSCULAR; INTRAVENOUS at 04:13

## 2021-07-14 RX ADMIN — TRANEXAMIC ACID 1 G: 1 INJECTION, SOLUTION INTRAVENOUS at 04:41

## 2021-07-14 RX ADMIN — CEFAZOLIN SODIUM 2 G: 2 INJECTION, SOLUTION INTRAVENOUS at 08:08

## 2021-07-14 RX ADMIN — LIDOCAINE HYDROCHLORIDE,EPINEPHRINE BITARTRATE 10 ML: 20; .005 INJECTION, SOLUTION EPIDURAL; INFILTRATION; INTRACAUDAL; PERINEURAL at 04:13

## 2021-07-14 RX ADMIN — PHENYLEPHRINE HYDROCHLORIDE 200 MCG: 10 INJECTION INTRAVENOUS at 04:48

## 2021-07-14 RX ADMIN — BUPIVACAINE HYDROCHLORIDE 10 ML: 2.5 INJECTION, SOLUTION EPIDURAL; INFILTRATION; INTRACAUDAL at 01:47

## 2021-07-14 RX ADMIN — CETIRIZINE HYDROCHLORIDE 10 MG: 10 TABLET, FILM COATED ORAL at 20:40

## 2021-07-14 RX ADMIN — ACETAMINOPHEN 975 MG: 325 TABLET ORAL at 14:43

## 2021-07-14 RX ADMIN — AZITHROMYCIN MONOHYDRATE 500 MG: 500 INJECTION, POWDER, LYOPHILIZED, FOR SOLUTION INTRAVENOUS at 04:12

## 2021-07-14 RX ADMIN — GENTAMICIN SULFATE 230 MG: 40 INJECTION, SOLUTION INTRAMUSCULAR; INTRAVENOUS at 01:33

## 2021-07-14 RX ADMIN — METRONIDAZOLE 500 MG: 500 INJECTION, SOLUTION INTRAVENOUS at 06:59

## 2021-07-14 RX ADMIN — DOCUSATE SODIUM 50 MG AND SENNOSIDES 8.6 MG 2 TABLET: 8.6; 5 TABLET, FILM COATED ORAL at 20:40

## 2021-07-14 RX ADMIN — KETOROLAC TROMETHAMINE 30 MG: 30 INJECTION, SOLUTION INTRAMUSCULAR; INTRAVENOUS at 12:01

## 2021-07-14 RX ADMIN — ACETAMINOPHEN 975 MG: 325 TABLET ORAL at 08:01

## 2021-07-14 RX ADMIN — MORPHINE SULFATE 2 MG: 0.5 INJECTION, SOLUTION EPIDURAL; INTRATHECAL; INTRAVENOUS at 05:21

## 2021-07-14 RX ADMIN — PHENYLEPHRINE HYDROCHLORIDE 100 MCG: 10 INJECTION INTRAVENOUS at 04:57

## 2021-07-14 RX ADMIN — KETOROLAC TROMETHAMINE 30 MG: 30 INJECTION, SOLUTION INTRAMUSCULAR; INTRAVENOUS at 05:25

## 2021-07-14 NOTE — PROGRESS NOTES
Updated Dr Bustos to lactic acid value. She is going to continue currents antibiotics and close monitoring of patient at this time.

## 2021-07-14 NOTE — PROGRESS NOTES
Pt rating vaginal pain 8/10. Stated feeling both sharp pain and pressure. SVE=8/80/0.   MDA called to assess for epidural bolus.

## 2021-07-14 NOTE — PROGRESS NOTES
CNM updated on blood pressures and lack of NICU bed. Given contact info for RUPINDER ANDERSON to coordinate a plan for this baby's care.

## 2021-07-14 NOTE — PLAN OF CARE
Problem: Postoperative Urinary Retention (Postpartum  Delivery)  Goal: Effective Urinary Elimination  Outcome: Improving   Hooks catheter removed at 1215 pm 21. Pt is aware that she will need to void by 1615 today per protocol.     She was visiting with multiple providers such as NICU for updates and lactation mltiple times this shift and has not ambulated since surgery. At 1230pm her legs were still heavy and after her visits she was eating her lunch finally at 1430. She has no nausea at this time while eating. Pt instructed to eat small portion slowly and take a break before eating the rest to reduce changes for nausea.     Right hand IV removed due to pt reporting pain and requesting to remove it so she can hold her fork correctly and so she can have less difficulty breastfeeding.

## 2021-07-14 NOTE — ANESTHESIA POSTPROCEDURE EVALUATION
Patient: Sola Akers    Procedure(s):   SECTION    Diagnosis:Fetal intolerance to labor, delivered, current hospitalization [O77.9]  Diagnosis Additional Information: No value filed.    Anesthesia Type:  Epidural    Note:  Disposition: Inpatient   Postop Pain Control: Uneventful            Sign Out: Well controlled pain   PONV: No   Neuro/Psych: Uneventful            Sign Out: Acceptable/Baseline neuro status   Airway/Respiratory: Uneventful            Sign Out: Acceptable/Baseline resp. status   CV/Hemodynamics: Uneventful            Sign Out: Acceptable CV status; No obvious hypovolemia; No obvious fluid overload   Other NRE: NONE   DID A NON-ROUTINE EVENT OCCUR? No           Last vitals:  Vitals:    21 1000 21 1100 21 1200   BP: 119/58     Pulse:      Resp:      Temp:      SpO2: 92% 95% 96%       Last vitals prior to Anesthesia Care Transfer:  CRNA VITALS  2021 0505 - 2021 0605      2021             Pulse:  116    Ht Rate:  116    SpO2:  96 %          Electronically Signed By: Antony Suarez MD  2021  12:21 PM

## 2021-07-14 NOTE — PROGRESS NOTES
CNM updated on latest SVE, maternal temp, fetal tachycardia. Order received to start Triple I order set and place [facility] if no cervical change with next exam.

## 2021-07-14 NOTE — L&D DELIVERY NOTE
OB  Delivery Note      Sola Akers MRN# [id number]   Age: 31 year old YOB: 1990       GA: 37w3d  GP:   Labor Complications: Fetal Intolerance;Chorioamnionitis   EBL:   834 mL  Delivery QBL:    Delivery Type: , Low Transverse   ROM to Delivery Time: (Delivered) Hours: 17 Minutes: 24     1 Minute 5 Minute 10 Minute   Apgar Totals: 4   8        Personel Present:       Details    Pre-Op Diagnosis: 1. Intrauterine pregnancy at 37w3d  2. gHTN  3.suspected chorioamnionitis  4. Fetal intolerance to labor   Post-Op Diagnosis: 1. Same as above        Procedure:   , Low Transverse  via   incision   Anesthesia: Epidural    Assistant: Daquan Santana PGY2    Informed Consent:    Patient evaluated in the setting of maternal tachycardia, fever and persistent fetal tachycardia.Patient delevoped elevated temp about 2245. Patient given tylenol and antibiotics begun. By 0300 fetal tachycardia and maternal fever not responsive to interventions and FHT now showing minimal variability. Patient dilated to 9/90/-1 however due to persistent fetal and maternal tachycardia in a G1 recommended proceeding with  section over trial of pushing. Patient in agreement.  The risks, benefits, complications, and alternatives were discussed with the patient. The patient understood that the risks of  section include, but are not limited to: injury to nearby structures or organs, infection, blood loss and possible need for transfusion, and potential need for more surgery including hysterectomy. The patient stated understanding and desired to proceed. All questions were answered. The site of surgery was properly noted and marked. The patient was identified as Sola Akers and the procedure verified as a  delivery. A Time Out was held and the above information confirmed.    Procedure Details:  The patient was taken to the operating room where Epidural bolus anesthesia  was found to be adequate. Antibiotics were given for infection prophylaxis. She was prepped and draped in the normal sterile fashion in the dorsal supine position with leftward tilt. A Pfannenstiel skin incision was made with scalpel. The incision was carried down to the fascia with the scalpel with bovie cauterization as needed for hemostasis. The fascia was incised and extended laterally with Monroe scissors. The inferior aspect of the fascia was grasped with Kocher clamps. The underlying rectus and pyramidalis muscles were dissected off with blunt dissection. In a similar fashion, the superior aspect of the fascia was elevated with Kocher clamps, and the rectus muscle was dissected off. The rectus muscles were  bluntly down the midline down to the level of the pubic symphysis. The peritoneum was identified and entered bluntly. Peritoneal incision was extended superiorly and inferiorly with good visualization of the bladder.    The bladder blade was inserted, vesicouterine peritoneum was identified. The lower uterine segment was then incised with a   transverse incision and was extended bluntly. The amniotic sac was ruptured and Clear  color noted. The bladder blade was removed and the infant was delivered atraumatically using the usual maneuvers.  The remainder of the infant was delivered, the cord clamped and cut, and the baby was handed off to the awaiting clinicians. Baby weighed 6#10.5oz, with APGARs 4/8.    The placenta was removed via manual massage. The uterus was then exteriorized and cleared of all clots and debris. The hysterotomy was repaired with suture of 0 Monocryl in a running locked fashion. A second imbricating layer of the same suture was placed and good hemostasis observed. The ovaries and tubes appeared normal bilaterally. The uterus, tubes, and ovaries were then returned to the abdomen and pelvis. The uterine incision was reinspected and found to be hemostatic. The subfascial spaces  were inspected and noted to be hemostatic. The fascia was then reapproximated with a running suture of 0 Vicryl. The subcutaneous layer was inspected, hemostasis was achieved with bovie electrocautery and the layer was closed with 3-0 vicryl in an interrupted fashion. The skin was closed with 0 Monocryl sutures. Exofin was applied to the skin.     The patient tolerated the procedure well. Sponge, instrument, and needle counts were correct and the patient was taken to the recovery room in good and stable condition.       Ector Akers [[id number]]    Labor Event Times    Decision date/time (emergent ): 2021 0355      Labor Length    3rd Stage (hrs): 0 (min): 2      Labor Events     labor?: No   steroids: None  Labor Type: Induction/Cervical ripening  Predominate monitoring during 1st stage: continuous electronic fetal monitoring     Antibiotics received during labor?: Yes  Reason for Antibiotics: Chorioamnionitis  Antibiotics given for Chorioamnionitis: Ampicillin/Gentamicin  Antibiotics Given (Chorioamnionitis): Greater than 4 hours prior to delivery     Rupture identifier: Sac 1  Rupture date/time: 21 1105   Rupture type: Artificial Rupture of Membranes  Fluid color: Clear  Fluid odor: Normal     Induction: Cervidil, Misoprostol, Oxytocin  Induction date/time: 21 1100   Cervical ripening date/time: 21    Indications for induction: Hypertension     Augmentation: AROM  Indications for augmentation: Ineffective Contraction Pattern  1:1 continuous labor support provided by?: RN       Delivery/Placenta Date and Time    Delivery Date: 21 Delivery Time:  4:29 AM   Placenta Date/Time: 2021  4:32 AM  Oxytocin given at the time of delivery: after delivery of baby  Delivering clinician: Elvira Bustos,           Vaginal Counts     Initial count performed by 2 team members:  Two Team Members   See OR Record       Needles Suture Needles Sponges (RETIRED)  "Instruments   Initial counts       Added to count       Relief counts       Final counts             Placed during labor Accounted for at the end of labor   FSE NA NA   IUPC NA NA   Cervadil Yes Yes                  Final count correct?: Yes     Apgars    Living status: Living   1 Minute 5 Minute 10 Minute 15 Minute 20 Minute   Skin color: 0  1       Heart rate: 2  2       Reflex irritability: 1  2       Muscle tone: 1  2       Respiratory effort: 0  1       Total: 4  8       Apgars assigned by: ROSANNE CABRAL NP     Cord    Vessels: 3 Vessels    Cord Complications: None               Cord Blood Disposition: Lab    Gases Sent?: Yes    Delayed cord clamping?: Yes    Cord Clamping Delay (seconds): 31-60 seconds    Stem cell collection?: No        Resuscitation    Methods: Suctioning, Oxygen, NCPAP, Bag Mask      Measurements    Weight: 6 lb 10.5 oz Length: 1' 8\"   Head circumference: 34 cm       Skin to Skin and Feeding Plan    Skin to skin initiation date/time:     Skin to skin end date/time:     Reason skin to skin not initiated:  Acuity     Labor Events and Shoulder Dystocia    Fetal Tracing Prior to Delivery: Category 2  Shoulder dystocia present?: Neg     Delivery (Maternal) (Provider to Complete) ([id number])       Blood Loss  Mother: Sola Akers #[id number]   Start of Mother's Information    Delivery Blood Loss  21 0423 - 21 0539    Total Surgical QBL Blood Loss (mL) Hospital Encounter 834 mL    Total  834 mL         End of Mother's Information  Mother: Sola Akers #[id number]          Delivery - Provider to Complete ([id number])    Delivering clinician: Elvira Bustos DO  CNM Care: Any CNM care in labor  Attempted Delivery Types (Choose all that apply): Spontaneous Vaginal Delivery  Delivery Type (Choose the 1 that will go to the Birth History): , Low Transverse                    Priority: Urgent    Specifics: Primary nulliparius "   Indications for Primary: Fetal Status                 Placenta    Date/Time: 7/14/2021  4:32 AM  Removal: Manual Removal  Disposition: Pathology           Anesthesia    Method: Epidural                Presentation and Position    Presentation: Vertex                  Elvira Bustos DO

## 2021-07-14 NOTE — ANESTHESIA CARE TRANSFER NOTE
Patient: Sola Akers    Procedure(s):   SECTION    Diagnosis: Fetal intolerance to labor, delivered, current hospitalization [O77.9]  Diagnosis Additional Information: No value filed.    Anesthesia Type:   Epidural     Note:    Oropharynx: oropharynx clear of all foreign objects  Level of Consciousness: awake  Oxygen Supplementation: room air    Independent Airway: airway patency satisfactory and stable  Dentition: dentition unchanged  Vital Signs Stable: post-procedure vital signs reviewed and stable  Report to RN Given: handoff report given  Patient transferred to: Labor and Delivery    Handoff Report: Identifed the Patient, Identified the Reponsible Provider, Reviewed the pertinent medical history, Discussed the surgical course, Reviewed Intra-OP anesthesia mangement and issues during anesthesia, Set expectations for post-procedure period and Allowed opportunity for questions and acknowledgement of understanding      Vitals: (Last set prior to Anesthesia Care Transfer)  CRNA VITALS  2021 0505 - 2021 0543      2021             Pulse:  116    Ht Rate:  116    SpO2:  96 %        Electronically Signed By: EVELIN Farfan CRNA  2021  5:43 AM

## 2021-07-14 NOTE — PROGRESS NOTES
S- Pt comfortable after epidural placement.  FOB at bedside  O- .BP (!) 150/94   Pulse 90   Temp 98.5  F (36.9  C) (Oral)   Resp 16   Ht 1.524 m (5')   Wt 115.2 kg (254 lb)   LMP 10/25/2020   SpO2 99%   BMI 49.61 kg/m     2 elevated BP's (prior to labetalol,which was given 45min ago)  Currently cat 1 tracing- periods of minimal variability and 1 late appearing deceleration per RN. This improved with IV bolus, decreasing pitocin and position change  Ctx q 3min, moderate to palpation  Cervix /-2 per RN  A- 37.2 IOL gHTN  Active labor  P- Continue to monitor BP.  If remain elevated, recheck HEEL[facility] lab and notify dr Bustos   Continue to monitor and support  Continue pitocin inductin per protocol  Anticipate

## 2021-07-14 NOTE — LACTATION NOTE
This note was copied from a baby's chart.  Lactation consultant to patient room to assess breastfeeding (history, goals, & current experience). 1st baby, history of low thyroid (not treated in pregnancy), history of PCOS. Hopes to exclusively breastfeed 1 year.      Reviewed benefit of skin to skin prior to feeding to help get baby ready for feeding, importance of feeding baby on early hunger cues, and breastfeeding 8-12 times in 24 hours for optimal infant nutrition and hydration as well as for building an optimal milk supply.  Education given regarding importance of optimal positioning for deep, comfortable latch and effective milk transfer. Assisted with football hold, baby latched with occasional rhythmic sucking and occasional swallow for 10 minutes per side, followed by 20 ML formula per physician order.     Instructed on hand expression and breast compression. Set mom up pumping with HGP and 24 mm breast shields. Several large drops of milk from each side were then rubbed inside baby's cheeks with gloved finger. Reviewed feeding plan for LPI baby.     Reviewed online and outpatient lactation resources for breastfeeding help after discharge.     Answered questions and gave encouragement.

## 2021-07-14 NOTE — PROGRESS NOTES
CNM updated on patient's elevated vital signs, fetal tachycardia. Requested she come in. Pitocin stopped.

## 2021-07-14 NOTE — PROGRESS NOTES
Called in to evaluate patient in the setting of maternal tachycardia, fever and persistent fetal tachycardia.Patient delevoped elevated temp about 2245. Patient given tylenol and antibiotics begun. By 0300 fetal tachycardia and maternal fever not responsive to interventions and FHT now showing minimal variability. Patient still dilated to 9/90/-1 however due to persistent fetal and maternal tachycardia in a G1 recommended proceeding with  section over trial of pushing. Patient in agreement. Counseled on risks, benefits, and alternatives of  section including but not limited to, bleeding, infection, damage to surrounding structures and need for further surgery. Patient and  questions were answered and she indicated her consent with her signature. To proceed with  delivery.     Elvira Bustos, DO  [facility]  [phone]

## 2021-07-14 NOTE — PROGRESS NOTES
Day of birth round:  Approximately 5 hr PP. Pain is well controlled with pain meds.  Pt denies SOB, dizziness or lightheadedness.  Baby was able to stay at bedside and did not have to transfer to another hospital and couple is very grateful for this.  She is processing the events of labor/birth.  Bleeding is appropriate for PP period.  No concerns at this point.  Aware that provider will round in the a.m.

## 2021-07-14 NOTE — PROGRESS NOTES
Dr. Leon at bedside. Epidural rebolused. CNM updated on Sepsis Protocol alert. She declined to order lactic acid level at this time. Already doing frequent VS. CNM also updated on most recent SVE, fetal tachycardia and maternal temp.

## 2021-07-14 NOTE — PLAN OF CARE
Problem: Pain (Postpartum  Delivery)  Goal: Acceptable Pain Control  Outcome: Improving   Patient currently denying pain. Patient trying to sleep between cares.  Radha Fowler RN  2021 10:25 AM

## 2021-07-14 NOTE — PROGRESS NOTES
At 2245 RN called and updated regarding pt status  She reported pt was 7cm and had been 7 about an hour ago, but rechecked her as pt was experiencing increased pain and pressure.  She reported that her Temp was 102, maternal tachycardia and fetal tachycardia were noted.  Moderate variability was maintained per RN. Dx chorioamnitis  was made and triple I orders were initiated.  Orders for 1gm acetaminophen was also ordered. IUPC ordered of no cervical change. RN called at 1230 and updated regarding her 2339 BP. Pt was in pain and had just received epidural bolus.  Repeat had improved although not significantly.  Was going to continue to monitor and update with worsening VSS.  At 0230 was called and updated regarding continued fetal and maternal tachycardia despite acetaminophen and now temp of 103 Upon arrival at [facility] at 0300, EFM tracing 190's with minimal variability x >2 hours.  Ctx 3-6 as pitocin had been shut off. Cervix 9/90/-1.  Due to cont cat 2 tracing, Dr justice called right away to come assess pt for  section. She is in route

## 2021-07-15 LAB — HGB BLD-MCNC: 9.3 G/DL (ref 11.7–15.7)

## 2021-07-15 PROCEDURE — 85018 HEMOGLOBIN: CPT | Performed by: OBSTETRICS & GYNECOLOGY

## 2021-07-15 PROCEDURE — 120N000001 HC R&B MED SURG/OB

## 2021-07-15 PROCEDURE — 250N000013 HC RX MED GY IP 250 OP 250 PS 637: Performed by: REGISTERED NURSE

## 2021-07-15 PROCEDURE — 250N000013 HC RX MED GY IP 250 OP 250 PS 637: Performed by: MIDWIFE

## 2021-07-15 PROCEDURE — 250N000013 HC RX MED GY IP 250 OP 250 PS 637: Performed by: OBSTETRICS & GYNECOLOGY

## 2021-07-15 PROCEDURE — 36415 COLL VENOUS BLD VENIPUNCTURE: CPT | Performed by: OBSTETRICS & GYNECOLOGY

## 2021-07-15 PROCEDURE — 250N000013 HC RX MED GY IP 250 OP 250 PS 637: Performed by: ADVANCED PRACTICE MIDWIFE

## 2021-07-15 PROCEDURE — 250N000011 HC RX IP 250 OP 636: Performed by: OBSTETRICS & GYNECOLOGY

## 2021-07-15 RX ORDER — FERROUS SULFATE 325(65) MG
325 TABLET ORAL 2 TIMES DAILY
Status: DISCONTINUED | OUTPATIENT
Start: 2021-07-15 | End: 2021-07-16 | Stop reason: HOSPADM

## 2021-07-15 RX ORDER — LABETALOL 100 MG/1
100 TABLET, FILM COATED ORAL EVERY 12 HOURS SCHEDULED
Status: DISCONTINUED | OUTPATIENT
Start: 2021-07-15 | End: 2021-07-16 | Stop reason: HOSPADM

## 2021-07-15 RX ADMIN — CETIRIZINE HYDROCHLORIDE 10 MG: 10 TABLET, FILM COATED ORAL at 21:51

## 2021-07-15 RX ADMIN — ACETAMINOPHEN 975 MG: 325 TABLET ORAL at 21:51

## 2021-07-15 RX ADMIN — PRENATAL VIT W/ FE FUMARATE-FA TAB 27-0.8 MG 1 TABLET: 27-0.8 TAB at 21:51

## 2021-07-15 RX ADMIN — FERROUS SULFATE TAB 325 MG (65 MG ELEMENTAL FE) 325 MG: 325 (65 FE) TAB at 21:51

## 2021-07-15 RX ADMIN — DOCUSATE SODIUM 50 MG AND SENNOSIDES 8.6 MG 2 TABLET: 8.6; 5 TABLET, FILM COATED ORAL at 09:55

## 2021-07-15 RX ADMIN — OXYCODONE HYDROCHLORIDE 5 MG: 5 TABLET ORAL at 20:03

## 2021-07-15 RX ADMIN — LABETALOL HCL 100 MG: 100 TABLET, FILM COATED ORAL at 21:50

## 2021-07-15 RX ADMIN — ACETAMINOPHEN 975 MG: 325 TABLET ORAL at 03:11

## 2021-07-15 RX ADMIN — FERROUS SULFATE TAB 325 MG (65 MG ELEMENTAL FE) 325 MG: 325 (65 FE) TAB at 09:55

## 2021-07-15 RX ADMIN — DOCUSATE SODIUM 50 MG AND SENNOSIDES 8.6 MG 2 TABLET: 8.6; 5 TABLET, FILM COATED ORAL at 21:51

## 2021-07-15 RX ADMIN — IBUPROFEN 800 MG: 800 TABLET, FILM COATED ORAL at 01:08

## 2021-07-15 RX ADMIN — ACETAMINOPHEN 975 MG: 325 TABLET ORAL at 15:57

## 2021-07-15 RX ADMIN — ACETAMINOPHEN 975 MG: 325 TABLET ORAL at 09:54

## 2021-07-15 RX ADMIN — IBUPROFEN 800 MG: 800 TABLET, FILM COATED ORAL at 19:04

## 2021-07-15 RX ADMIN — OXYCODONE HYDROCHLORIDE 5 MG: 5 TABLET ORAL at 15:58

## 2021-07-15 RX ADMIN — IBUPROFEN 800 MG: 800 TABLET, FILM COATED ORAL at 07:04

## 2021-07-15 RX ADMIN — ENOXAPARIN SODIUM 40 MG: 40 INJECTION SUBCUTANEOUS at 05:39

## 2021-07-15 RX ADMIN — ENOXAPARIN SODIUM 40 MG: 40 INJECTION SUBCUTANEOUS at 15:58

## 2021-07-15 RX ADMIN — OXYCODONE HYDROCHLORIDE 5 MG: 5 TABLET ORAL at 09:54

## 2021-07-15 RX ADMIN — IBUPROFEN 800 MG: 800 TABLET, FILM COATED ORAL at 13:08

## 2021-07-15 NOTE — PLAN OF CARE
Problem: Pain (Postpartum  Delivery)  Goal: Acceptable Pain Control  Outcome: Improving  Sola's pain is being managed adequately with prn Oxycodone, scheduled Ibuprofen and ES Tylenol.  Sola tolerated showering today independently.  Ambulates without difficulty.     Problem: Bleeding (Postpartum  Delivery)  Goal: Hemostasis  Outcome: Improving  Bleeding is scant; Hgb= 9.3

## 2021-07-15 NOTE — PROGRESS NOTES
Prior to scheduled Toradol administration, Mendy Jeo CNM was notified that patient's IV was occluded and removed.  OK to switch patient to Ibuprofen now.

## 2021-07-15 NOTE — PROGRESS NOTES
Postpartum Day 1    Patient Name:  Sola Akers  :  1990  MRN:  [id number]      Assessment:  Normal postpartum course. Acute post operative blood loss anemia.    Plan:  Continue current care. PO iron. Consider discharge home tomorrow pending baby's status.     Subjective:  The patient feels well:  Voiding without difficulty, lochia normal, tolerating normal diet, and passing flatus.  Pain is well controlled with current medications.  The patient has no emotional concerns.  The baby is well and being fed by both breast and bottle. Currently being monitored at the bedside under radiant warmer for r/o sepsis secondary to chorioamnionitis.  Denies SOB, chest pain, HA, blurred vision, epigastric pain, abdominal pain outside expected incisional pain.     Objective:  /76   Pulse 106   Temp 98.6  F (37  C) (Oral)   Resp 18   Ht 1.524 m (5')   Wt 115.2 kg (254 lb)   LMP 10/25/2020   SpO2 97%   Breastfeeding Unknown   BMI 49.61 kg/m    Patient Vitals for the past 24 hrs:   BP Temp Temp src Pulse Resp SpO2   07/15/21 0954 133/76 98.6  F (37  C) Oral 106 18 97 %   07/15/21 0300 122/68 98.4  F (36.9  C) Oral 87 16 97 %   21 2300 124/67 98.3  F (36.8  C) Oral 85 16 97 %   21 2250 121/68 -- -- -- -- --   21 121/68 -- -- -- -- --   21 1823 116/67 98.1  F (36.7  C) Oral 96 16 97 %   21 1657 -- -- -- -- -- 96 %   21 1600 -- -- -- -- -- 97 %   21 1500 -- -- -- -- -- 96 %   21 1443 -- 98.3  F (36.8  C) -- 97 17 --   21 1400 -- -- -- 99 20 92 %   21 1300 -- -- -- 96 17 94 %   21 1201 -- -- -- 108 19 --   21 1200 -- -- -- 98 20 96 %   21 1100 -- -- -- 99 20 95 %       The amount and color of the lochia is appropriate for the duration of recovery.  The uterine fundus is firm.  Urinary output is adequate.  The sutured incision is clean, dry and intact.    Hgb: 9.3  Blood type: A+  RPR: NR  Rubella: Immune    There is no  immunization history on file for this patient.    Provider:  Kylie Iyer CNM      Date:  7/15/2021  Time:  10:22 AM

## 2021-07-15 NOTE — LACTATION NOTE
This note was copied from a baby's chart.  Instructed on SNS use. Baby transferred 17 ML of formula from SNS in 10 minutes on R breast. Mom is highly motivated to breastfeed, and was excited about the effectiveness of the SNS. Encouraged her to continue to pump after breastfeeding with SNS to stimulate her supply as she has history of PCOS and high blood pressure which could affect milk supply.

## 2021-07-15 NOTE — PLAN OF CARE
Problem: Adjustment to Role Transition (Postpartum  Delivery)  Goal: Successful Maternal Role Transition  Outcome: Improving  Intervention: Support Maternal Role Transition  Recent Flowsheet Documentation  Taken 2021 1823 by Chani Ludwig RN  Supportive Measures:   active listening utilized   positive reinforcement provided     Problem: Bleeding (Postpartum  Delivery)  Goal: Hemostasis  Outcome: Improving   Scant rubra lochia, no clots.   Problem: Pain (Postpartum  Delivery)  Goal: Acceptable Pain Control  Outcome: Improving  Intervention: Prevent or Manage Pain  Recent Flowsheet Documentation  Taken 2021 by Chani Ludwig RN  Pain Management Interventions: medication (see MAR)     Problem: Postoperative Urinary Retention (Postpartum  Delivery)  Goal: Effective Urinary Elimination  Outcome: Improving   Sola's VSS.  She's voiding just adequate amounts.  Encouraged to increase fluid intake, which she is doing.  She's been independently and ably ambulating since first time up to void.  She's been taking Tylenol and Ibuprofen for incisional pain she rates 3-4, with good results.  Sola and her  are both bonding with baby, holding her often and Sola putting her to breast.  He has been supplementing baby with formula.

## 2021-07-15 NOTE — PROGRESS NOTES
Dr. Molina called for parameters on Labetalol administration after last 2 BP were: 116/67 and 121/68.  She ordered the Labetalol order to be discontinued.

## 2021-07-15 NOTE — LACTATION NOTE
This note was copied from a baby's chart.  Lactation to room to assess feeding. Mom states baby will latch, but not rhthmically sucking well.  Pumping 5 ML every 3 hours. Baby no longer requiring supplement, but mom states baby still seems hungry after breastfeeding so they are supplementing with formula.    Discussed use of SNS as mom is highly motivated and wants to breastfeeding.  Will attempt use at next feeding.

## 2021-07-16 VITALS
TEMPERATURE: 98.1 F | RESPIRATION RATE: 18 BRPM | HEIGHT: 60 IN | BODY MASS INDEX: 49.87 KG/M2 | HEART RATE: 95 BPM | WEIGHT: 254 LBS | SYSTOLIC BLOOD PRESSURE: 145 MMHG | DIASTOLIC BLOOD PRESSURE: 79 MMHG | OXYGEN SATURATION: 98 %

## 2021-07-16 LAB
BASOPHILS # BLD AUTO: 0 10E3/UL (ref 0–0.2)
BASOPHILS NFR BLD AUTO: 0 %
EOSINOPHIL # BLD AUTO: 0.1 10E3/UL (ref 0–0.7)
EOSINOPHIL NFR BLD AUTO: 1 %
ERYTHROCYTE [DISTWIDTH] IN BLOOD BY AUTOMATED COUNT: 12.8 % (ref 10–15)
HCT VFR BLD AUTO: 29.1 % (ref 35–47)
HGB BLD-MCNC: 9.6 G/DL (ref 11.7–15.7)
IMM GRANULOCYTES # BLD: 0.1 10E3/UL
IMM GRANULOCYTES NFR BLD: 1 %
LYMPHOCYTES # BLD AUTO: 2.8 10E3/UL (ref 0.8–5.3)
LYMPHOCYTES NFR BLD AUTO: 24 %
MCH RBC QN AUTO: 30.4 PG (ref 26.5–33)
MCHC RBC AUTO-ENTMCNC: 33 G/DL (ref 31.5–36.5)
MCV RBC AUTO: 92 FL (ref 78–100)
MONOCYTES # BLD AUTO: 0.4 10E3/UL (ref 0–1.3)
MONOCYTES NFR BLD AUTO: 3 %
NEUTROPHILS # BLD AUTO: 8.3 10E3/UL (ref 1.6–8.3)
NEUTROPHILS NFR BLD AUTO: 71 %
NRBC # BLD AUTO: 0 10E3/UL
NRBC BLD AUTO-RTO: 0 /100
PATH REPORT.COMMENTS IMP SPEC: NORMAL
PATH REPORT.COMMENTS IMP SPEC: NORMAL
PATH REPORT.FINAL DX SPEC: NORMAL
PATH REPORT.GROSS SPEC: NORMAL
PATH REPORT.MICROSCOPIC SPEC OTHER STN: NORMAL
PATH REPORT.RELEVANT HX SPEC: NORMAL
PHOTO IMAGE: NORMAL
PLATELET # BLD AUTO: 323 10E3/UL (ref 150–450)
RBC # BLD AUTO: 3.16 10E6/UL (ref 3.8–5.2)
WBC # BLD AUTO: 11.6 10E3/UL (ref 4–11)

## 2021-07-16 PROCEDURE — 250N000013 HC RX MED GY IP 250 OP 250 PS 637: Performed by: ADVANCED PRACTICE MIDWIFE

## 2021-07-16 PROCEDURE — 250N000013 HC RX MED GY IP 250 OP 250 PS 637: Performed by: REGISTERED NURSE

## 2021-07-16 PROCEDURE — 250N000011 HC RX IP 250 OP 636: Performed by: OBSTETRICS & GYNECOLOGY

## 2021-07-16 PROCEDURE — 250N000013 HC RX MED GY IP 250 OP 250 PS 637: Performed by: OBSTETRICS & GYNECOLOGY

## 2021-07-16 PROCEDURE — 85025 COMPLETE CBC W/AUTO DIFF WBC: CPT | Performed by: OBSTETRICS & GYNECOLOGY

## 2021-07-16 PROCEDURE — 36415 COLL VENOUS BLD VENIPUNCTURE: CPT | Performed by: OBSTETRICS & GYNECOLOGY

## 2021-07-16 RX ORDER — OXYCODONE HYDROCHLORIDE 5 MG/1
5 TABLET ORAL EVERY 4 HOURS PRN
Qty: 13 TABLET | Refills: 0 | Status: SHIPPED | OUTPATIENT
Start: 2021-07-16 | End: 2021-07-27

## 2021-07-16 RX ADMIN — ENOXAPARIN SODIUM 40 MG: 40 INJECTION SUBCUTANEOUS at 04:58

## 2021-07-16 RX ADMIN — IBUPROFEN 800 MG: 800 TABLET, FILM COATED ORAL at 07:01

## 2021-07-16 RX ADMIN — OXYCODONE HYDROCHLORIDE 5 MG: 5 TABLET ORAL at 13:23

## 2021-07-16 RX ADMIN — OXYCODONE HYDROCHLORIDE 5 MG: 5 TABLET ORAL at 01:02

## 2021-07-16 RX ADMIN — LABETALOL HCL 100 MG: 100 TABLET, FILM COATED ORAL at 09:17

## 2021-07-16 RX ADMIN — IBUPROFEN 800 MG: 800 TABLET, FILM COATED ORAL at 13:23

## 2021-07-16 RX ADMIN — ACETAMINOPHEN 975 MG: 325 TABLET ORAL at 04:58

## 2021-07-16 RX ADMIN — IBUPROFEN 800 MG: 800 TABLET, FILM COATED ORAL at 01:01

## 2021-07-16 RX ADMIN — OXYCODONE HYDROCHLORIDE 5 MG: 5 TABLET ORAL at 05:03

## 2021-07-16 RX ADMIN — ACETAMINOPHEN 975 MG: 325 TABLET ORAL at 13:22

## 2021-07-16 RX ADMIN — OXYCODONE HYDROCHLORIDE 5 MG: 5 TABLET ORAL at 09:22

## 2021-07-16 RX ADMIN — DOCUSATE SODIUM 50 MG AND SENNOSIDES 8.6 MG 1 TABLET: 8.6; 5 TABLET, FILM COATED ORAL at 09:17

## 2021-07-16 RX ADMIN — FERROUS SULFATE TAB 325 MG (65 MG ELEMENTAL FE) 325 MG: 325 (65 FE) TAB at 09:18

## 2021-07-16 NOTE — PROGRESS NOTES
OB Discharge Summary      Date:  2021    Name:  Sola Akers  :  1990  MRN:  [id number]      Admission Date:  2021  Delivery Date:  2021  Gestational Age at Delivery:  37w3d  Discharge Date:  2021    Principal Diagnosis:    Patient Active Problem List   Diagnosis     Gestational hypertension     Conditions complicating Pregnancy: BMI 40, GHTN-on Labetalol 100mg BID, Polyhydromnios (NEYMAR at 15.9 on 21 prior NEYMAR at 30 on 21) EFW at 92% on .    Procedure(s) Performed:  Primary , Low Transverse    Indication for :     1. Intrauterine pregnancy at 37w3d  2. gHTN  3.suspected chorioamnionitis  4. Fetal intolerance to labor       Indication for Induction:  gHTN     Condition at Discharge:  Stable, will discharge on Labetalol 100mg BID.   Discharge Medications:    Sola Akers   Home Medication Instructions WING:[id number]    Printed on:21 105   Medication Information                      cetirizine (ZYRTEC) 10 MG tablet  Take 10 mg by mouth At Bedtime             labetalol (NORMODYNE) 100 MG tablet  Take 100 mg by mouth 2 times daily (with meals)             oxyCODONE (ROXICODONE) 5 MG tablet  Take 1 tablet (5 mg) by mouth every 4 hours as needed for pain             Prenatal Vit-Fe Fumarate-FA (PRENATAL MULTIVITAMIN W/IRON) 27-0.8 MG tablet  Take 1 tablet by mouth daily                 Discharge Plan:    [place] [facility] [place]  for BP check and 2 weeks for incision check and 6wks PP   Patient Instructions:      Physical activity: As tolerated. No lifting > 15lbs, no driving while on narcotics.     Diet:  Regular.    Medication:  See med list above.    Other:        Physician/CNM:  EVELIN Lemon CNM    Name:  Sola Akers  :  1990  MRN:  [id number]

## 2021-07-16 NOTE — LACTATION NOTE
This note was copied from a baby's chart.  This writer met briefly with Sola to assess her breastfeeding needs prior to discharge.  She reports that the SNS is working well with breastfeeding on the left breast but she struggles to latch infant on the right breast.  This writer gave her two SNS for home.  She was also given the 24 mm nipple shield with instructions on its use.  She was able to demonstrate how to place the nipple shield properly onto the nipple.  Suggest she use the SNS under the nipple shield for the right breast.  She has an outpatient lactation visit for Tuesday.  She was instructed to supplement infant as infant cues, increasing amount prn.  Sola is not getting any drops with pumping today.  She verbalizes understanding of all education given.  She denies any further questions.

## 2021-07-16 NOTE — OP NOTE
Sola Akers MRN# [id number]   Age: 31 year old YOB: 1990         GA: 37w3d  GP:   Labor Complications: Fetal Intolerance;Chorioamnionitis   EBL:   834 mL  Delivery QBL:    Delivery Type: , Low Transverse   ROM to Delivery Time: (Delivered) Hours: 17 Minutes: 24       1 Minute 5 Minute 10 Minute   Apgar Totals: 4   8        Personel Present:        Details     Pre-Op Diagnosis: 1. Intrauterine pregnancy at 37w3d  2. gHTN  3.suspected chorioamnionitis  4. Fetal intolerance to labor   Post-Op Diagnosis: 1. Same as above        Procedure:   , Low Transverse  via   incision   Anesthesia: Epidural    Assistant: Daquan Santana PGY2  Complications: none    Findings: normal appearing bilateral fallopian tubes and ovaries          Informed Consent:     Patient evaluated in the setting of maternal tachycardia, fever and persistent fetal tachycardia.Patient delevoped elevated temp about 2245. Patient given tylenol and antibiotics begun. By 0300 fetal tachycardia and maternal fever not responsive to interventions and FHT now showing minimal variability. Patient dilated to 9/90/-1 however due to persistent fetal and maternal tachycardia in a G1 recommended proceeding with  section over trial of pushing. Patient in agreement.  The risks, benefits, complications, and alternatives were discussed with the patient. The patient understood that the risks of  section include, but are not limited to: injury to nearby structures or organs, infection, blood loss and possible need for transfusion, and potential need for more surgery including hysterectomy. The patient stated understanding and desired to proceed. All questions were answered. The site of surgery was properly noted and marked. The patient was identified as Sola Akers and the procedure verified as a  delivery. A Time Out was held and the above information confirmed.     Procedure Details:  The  patient was taken to the operating room where Epidural bolus anesthesia was found to be adequate. Antibiotics were given for infection prophylaxis. She was prepped and draped in the normal sterile fashion in the dorsal supine position with leftward tilt. A Pfannenstiel skin incision was made with scalpel. The incision was carried down to the fascia with the scalpel with bovie cauterization as needed for hemostasis. The fascia was incised and extended laterally with Monroe scissors. The inferior aspect of the fascia was grasped with Kocher clamps. The underlying rectus and pyramidalis muscles were dissected off with blunt dissection. In a similar fashion, the superior aspect of the fascia was elevated with Kocher clamps, and the rectus muscle was dissected off. The rectus muscles were  bluntly down the midline down to the level of the pubic symphysis. The peritoneum was identified and entered bluntly. Peritoneal incision was extended superiorly and inferiorly with good visualization of the bladder.     The bladder blade was inserted, vesicouterine peritoneum was identified. The lower uterine segment was then incised with a   transverse incision and was extended bluntly. The amniotic sac was ruptured and Clear  color noted. The bladder blade was removed and the infant was delivered atraumatically using the usual maneuvers.  The remainder of the infant was delivered, the cord clamped and cut, and the baby was handed off to the awaiting clinicians. Baby weighed 6#10.5oz, with APGARs 4/8.     The placenta was removed via manual massage. The uterus was then exteriorized and cleared of all clots and debris. The hysterotomy was repaired with suture of 0 Monocryl in a running locked fashion. A second imbricating layer of the same suture was placed and good hemostasis observed. The ovaries and tubes appeared normal bilaterally. The uterus, tubes, and ovaries were then returned to the abdomen and pelvis. The uterine  incision was reinspected and found to be hemostatic. The subfascial spaces were inspected and noted to be hemostatic. The fascia was then reapproximated with a running suture of 0 Vicryl. The subcutaneous layer was inspected, hemostasis was achieved with bovie electrocautery and the layer was closed with 3-0 vicryl in an interrupted fashion. The skin was closed with 0 Monocryl sutures. Exofin was applied to the skin.      The patient tolerated the procedure well. Sponge, instrument, and needle counts were correct and the patient was taken to the recovery room in good and stable condition.      Elvira Bustos,   [facility]  [phone]

## 2021-07-16 NOTE — DISCHARGE SUMMARY
OB Discharge Summary      Date:  2021    Name:  Sola Akers  :  1990  MRN:  [id number]      Admission Date:  2021  Delivery Date:  @BABYSUPPRESS(DBLINK,ept,110,,1,,)@  Gestational Age at Delivery:  37w3d  Discharge Date:  2021    Conditions complicating Pregnancy: BMI 40, GHTN-on Labetalol 100mg BID, Polyhydromnios (NEYMAR at 15.9 on 21 prior NEYMAR at 30 on 21) EFW at 92% on .      Procedure(s) Performed:  Primary Low Transverse   Indication for :  1. Intrauterine pregnancy at 37w3d          2. gHTN                                           3.suspected chorioamnionitis                                           4. Fetal intolerance to labor    Indication for Induction:  gHTN    Condition at Discharge:  Stable, will discharge on Labetalol 100mg BID.     Discharge Medications:    Sola Akers   Home Medication Instructions WING:[id number]    Printed on:21 3342   Medication Information                      cetirizine (ZYRTEC) 10 MG tablet  Take 10 mg by mouth At Bedtime             labetalol (NORMODYNE) 100 MG tablet  Take 100 mg by mouth 2 times daily (with meals)             oxyCODONE (ROXICODONE) 5 MG tablet  Take 1 tablet (5 mg) by mouth every 4 hours as needed for pain             Prenatal Vit-Fe Fumarate-FA (PRENATAL MULTIVITAMIN W/IRON) 27-0.8 MG tablet  Take 1 tablet by mouth daily                 Discharge Plan:               Follow up with /TOMASM: [facility] on  or  for BP check and 2 weeks for incision check and 6wks PP              Patient Instructions:                            Physical activity: As tolerated. No lifting > 15lbs, no driving while on narcotics.                           Diet:  Regular.                          Medication:  See med list above.                          Other:         Physician/CNM:  EVELIN Lemon CNM    Name:  Sola Akers  :  1990  MRN:  [id number]

## 2021-07-16 NOTE — PLAN OF CARE
Problem: Hypertensive Disorders in Pregnancy  Goal: Maternal-Fetal Stabilization  Outcome: Improving  Intervention: Optimize Blood Pressure and Fluid Status  Recent Flowsheet Documentation  Taken 7/15/2021 1558 by Chani Ludiwg RN  Fluid/Electrolyte Management: (PO fluids ) fluids provided    Patient's Temp and RR stable.  Her BP and HR are trending up.  (See previous note from this shift.)  She continues to have adequate fluid intake and void ably and often.       Problem: Adjustment to Role Transition (Postpartum  Delivery)  Goal: Successful Maternal Role Transition  Outcome: Improving  Intervention: Support Maternal Role Transition  Recent Flowsheet Documentation  Taken 7/15/2021 1558 by Chani Ludwig RN  Supportive Measures:   active listening utilized   decision-making supported   positive reinforcement provided  Parent/Child Attachment Promotion:   positive reinforcement provided   cue recognition promoted     Mom is breastfeeding with an SnS system with formula.  Baby is able to latch well at breast and is taking adequate amounts of formula in this way.  Sola and her  are doing this independently.  They are both bonding with baby, holding her often and caring for her together.

## 2021-07-16 NOTE — DISCHARGE INSTRUCTIONS
"  Warning Signs after Having a Baby (Postpartum)  Keep this paper on your fridge or somewhere else where you can see it.  Baby's birth date ___________________ My provider/hospital ___________________________________  The symptoms below can happen to anyone after giving birth. They can be very serious. Call your provider if you have any of these warning signs.   Call 911 if you:    Are thinking about hurting yourself or your baby    Have any signs below in bold and with a star*  Bleeding  Losing too much blood is called a hemorrhage. These are signs of hemorrhage:    Soaking through a pad in less than an hour    Passing blood clots bigger than a golf ball  Mood problems (postpartum depression)  Many women feel sad after having a baby. But for others, mood swings are worse. Call your provider right away if you are:    Feeling so anxious or nervous that you can't care for yourself or your baby    Thinking about hurting yourself or your baby*  High blood pressure (pre-eclampsia)  Even if you didn't have high blood pressure when you were pregnant, you are still at risk for the high blood pressure disease called pre-eclampsia. Your risk can last up to 12 weeks after giving birth.  These are signs of pre-eclampsia:    Severe headache that does not get better after taking medicine*    Seeing spots, flashes of lights, blurry vision or other changes to eyesight*    Pain on your right side under your rib cage    Sudden swelling in the hands and face    Fainting, shaking or other signs of a seizure*    General feeling of \"not feeling right\"*  Infection    Redness around your incision (if you had surgery)    Really bad pain in your bottom if you had stitches    Any yellow, white or green fluid coming from places where you had stitches or surgery    Fever of 100.4 F (38 C) or higher  Blood clots  After you give birth, your body naturally clumps, or clots, its blood to help prevent blood loss (hemorrhage). Sometimes, this can " "lead to dangerous blood clots. Here are signs that you may have a blood clot:    Pain in your chest*    Hard to breathe*    A red or swollen spot on your leg that is warm or painful when you touch it  Remember:You know your body. If something doesn't feel right, call your provider.  For informational purposes only. Not to replace the advice of your health care provider. Copyright   2020 [employer]. All rights reserved. Clinically reviewed by Keli Rosales RNC-OB, MSN. [employer] [id number] - .  For informational purposes only. Not to replace the advice of your health care provider. Copyright   2020 [employer]. All rights reserved. Clinically reviewed by KAYLEE Henao-OB, MSN. [employer] [id number] - .      Assessment of Breastfeeding after discharge: Is baby is getting enough to eat?    - If you answer  YES  to all these questions by day 5, you will know breastfeeding is going well.    - If you answer  NO  to any of these questions, call your baby's medical provider or the lactation clinic.   - Refer to \"Postpartum and [place] Care\" (PNC) , starting on page 35. (This is the booklet you tracked baby's feedings and diaper counts while in the hospital.)   - Please call one of our Outpatient Lactation Consultants at [phone] at any time with breastfeeding questions or concerns.  1.  My milk came in (breasts became wing on day 3-5 after birth).  I am softening the areola using hand expression or reverse pressure softening prior to latch, as needed.  YES NO   2.  My baby breastfeeds at least 8 times in 24 hours. YES NO   3.  My baby usually gives feeding cues (answer  No  if your baby is sleepy and you need to wake baby for most feedings).  *PNC page 36   YES NO   4.  My baby latches on my breast easily.  *PNC page 37  YES NO   5.  During breastfeeding, I hear my baby frequently swallowing, (one-two sucks per swallow).  YES NO   6.  I allow my baby to drain the first breast " "before I offer the other side.   YES NO   7.  My baby is satisfied after breastfeeding.   *PNC page 39 YES NO   8.  My breasts feel wing before feedings and softer after feedings. YES NO   9.  My breasts and nipples are comfortable.  I have no engorgement or cracked nipples.    *PNC Page 40 and 41  YES NO   10.  My baby is meeting the wet diaper goals each day.  *PNC page 38  YES NO   11.  My baby is meeting the soiled diaper goals each day. *PNC page 38 YES NO   12.  My baby is only getting my breast milk, no formula. YES NO   13. I know my baby needs to be back to birth weight by day 14.  YES NO   14. I know my baby will cluster feed and have growth spurts. *PNC page 39  YES NO   15.  I feel confident in breastfeeding.  If not, I know where to get support. YES NO      [employer] has a short video (2:47) called:   \"Chester Hold/ Asymmetric Latch \" Breastfeeding Education by NIYAH.        Other websites:  www.[employer].ca-Breastfeeding Videos  www.[employer].org--Our videos-Breastfeeding  Www.[employer]    You have a Home Care nurse visit planned for Sunday, July 18th. The nurse will contact you to confirm the appointment time. Ifyou do not receive a call by Sreedhar morning, please call Home Care at [phone].      "

## 2021-07-16 NOTE — PROGRESS NOTES
Midwife (Elisabeth Santos) was called and updated with latest blood pressure results.  She consulted with Dr. Aguilar and reported to this writer/Rn that is ok for pt to discharge this evening as planned/ordered.

## 2021-07-16 NOTE — PLAN OF CARE
Discharge instructions and danger signs reviewed with Sola and her spouse.  Education completed.  Sola's prescription for Oxycodone was filled and provided to her.  She has a home nurse visit with blood pressure check on Sunday 7/18/21. She has a lactation visit on 7/20/21.  She is aware to schedule a 2 week and 6 week follow-up appointment in clinic.  AVS signed.

## 2021-07-16 NOTE — PLAN OF CARE
Problem: Hypertensive Disorders in Pregnancy  Goal: Maternal-Fetal Stabilization  Outcome: Improving   Sola is hoping to discharge later today if blood pressure remains stable.  Labetalol given as scheduled.    Vitals:    07/15/21 1558 07/15/21 2003 07/16/21 0102 21 0915   BP: 138/84 (!) 156/87 (!) 124/90 138/87   Pulse: 94 103 96 85   Resp:    Temp: 98.3  F (36.8  C)  98.4  F (36.9  C) 98.1  F (36.7  C)   TempSrc: Oral  Oral Oral   SpO2: 97%   98%   Weight:       Height:           Problem: Pain (Postpartum  Delivery)  Goal: Acceptable Pain Control  Outcome: Jacinta Selby reports adequate pain control with Oxycodone prn, scheduled Ibuprofen and ES Tylenol.  Her paper prescription for discharge needs was provided to her.  Oxycodone

## 2021-07-16 NOTE — PROGRESS NOTES
Dr. Suraez notified of patient's rising blood pressures and computerized Sepsis Risk notification after last set of vitals.  She order Labetalol per BP, and asked this RN to remove the sepsis orders that come up automatically with sepsis risk notification.

## 2021-07-16 NOTE — PROGRESS NOTES
"Outreach Note for RUSH Akers  [id number]  1990    Chart reviewed, discharge plan discussed with bedside RN and patient, Sola, needs assessed. Sola verbalizes understanding of plan, requests home care visit, [facility] nurse visit planned for , , Home Care Intake updated. Address and phone number reported as being correct in EMR. Follow-up clinic appointment for BP check planned Monday or Tuesday next week, and postpartum check appointments at 2 & 6 weeks, at [facility]; Sola will schedule as instructed.    Sola states she has good support at home, has baby care essentials, and feels ready to discharge later today with , \"Leslie Jose Egentry Oswald\". Outreach RN will continue to follow and assist if needed with discharge plan. No additional needs identified at this time.    "

## 2021-07-17 ENCOUNTER — HOSPITAL ENCOUNTER (INPATIENT)
Facility: HOSPITAL | Age: 31
LOS: 2 days | Discharge: HOME | DRG: 776 | End: 2021-07-19
Attending: OBSTETRICS & GYNECOLOGY | Admitting: OBSTETRICS & GYNECOLOGY
Payer: COMMERCIAL

## 2021-07-17 DIAGNOSIS — Z98.891 HISTORY OF LOW TRANSVERSE CESAREAN SECTION: ICD-10-CM

## 2021-07-17 LAB
ABO/RH(D): NORMAL
ALBUMIN MFR UR ELPH: 12.1 MG/DL
ALT SERPL W P-5'-P-CCNC: 34 U/L (ref 0–45)
ANTIBODY SCREEN: NEGATIVE
AST SERPL W P-5'-P-CCNC: 25 U/L (ref 0–40)
CREAT SERPL-MCNC: 0.68 MG/DL (ref 0.6–1.1)
CREAT UR-MCNC: 73 MG/DL
ERYTHROCYTE [DISTWIDTH] IN BLOOD BY AUTOMATED COUNT: 12.6 % (ref 10–15)
GFR SERPL CREATININE-BSD FRML MDRD: >90 ML/MIN/1.73M2
HCT VFR BLD AUTO: 28.1 % (ref 35–47)
HGB BLD-MCNC: 9.4 G/DL (ref 11.7–15.7)
MCH RBC QN AUTO: 30.7 PG (ref 26.5–33)
MCHC RBC AUTO-ENTMCNC: 33.5 G/DL (ref 31.5–36.5)
MCV RBC AUTO: 92 FL (ref 78–100)
PLATELET # BLD AUTO: 385 10E3/UL (ref 150–450)
PROT/CREAT 24H UR: 0.17 MG/MG CR
RBC # BLD AUTO: 3.06 10E6/UL (ref 3.8–5.2)
SPECIMEN EXPIRATION DATE: NORMAL
WBC # BLD AUTO: 8.9 10E3/UL (ref 4–11)

## 2021-07-17 PROCEDURE — 250N000013 HC RX MED GY IP 250 OP 250 PS 637: Performed by: OBSTETRICS & GYNECOLOGY

## 2021-07-17 PROCEDURE — 250N000011 HC RX IP 250 OP 636: Performed by: OBSTETRICS & GYNECOLOGY

## 2021-07-17 PROCEDURE — 120N000001 HC R&B MED SURG/OB

## 2021-07-17 PROCEDURE — 86900 BLOOD TYPING SEROLOGIC ABO: CPT | Performed by: OBSTETRICS & GYNECOLOGY

## 2021-07-17 PROCEDURE — 84156 ASSAY OF PROTEIN URINE: CPT | Performed by: OBSTETRICS & GYNECOLOGY

## 2021-07-17 PROCEDURE — 36415 COLL VENOUS BLD VENIPUNCTURE: CPT | Performed by: OBSTETRICS & GYNECOLOGY

## 2021-07-17 PROCEDURE — 84460 ALANINE AMINO (ALT) (SGPT): CPT | Performed by: OBSTETRICS & GYNECOLOGY

## 2021-07-17 PROCEDURE — 258N000003 HC RX IP 258 OP 636: Performed by: OBSTETRICS & GYNECOLOGY

## 2021-07-17 PROCEDURE — 82565 ASSAY OF CREATININE: CPT | Performed by: OBSTETRICS & GYNECOLOGY

## 2021-07-17 PROCEDURE — 85027 COMPLETE CBC AUTOMATED: CPT | Performed by: OBSTETRICS & GYNECOLOGY

## 2021-07-17 PROCEDURE — 84450 TRANSFERASE (AST) (SGOT): CPT | Performed by: OBSTETRICS & GYNECOLOGY

## 2021-07-17 RX ORDER — OXYCODONE HYDROCHLORIDE 5 MG/1
5 TABLET ORAL EVERY 4 HOURS PRN
Status: DISCONTINUED | OUTPATIENT
Start: 2021-07-17 | End: 2021-07-19 | Stop reason: HOSPADM

## 2021-07-17 RX ORDER — LORAZEPAM 2 MG/ML
2 INJECTION INTRAMUSCULAR
Status: DISCONTINUED | OUTPATIENT
Start: 2021-07-17 | End: 2021-07-19 | Stop reason: HOSPADM

## 2021-07-17 RX ORDER — MAGNESIUM SULFATE HEPTAHYDRATE 40 MG/ML
2 INJECTION, SOLUTION INTRAVENOUS
Status: DISCONTINUED | OUTPATIENT
Start: 2021-07-17 | End: 2021-07-19 | Stop reason: HOSPADM

## 2021-07-17 RX ORDER — LABETALOL 200 MG/1
200 TABLET, FILM COATED ORAL EVERY 12 HOURS SCHEDULED
Status: DISCONTINUED | OUTPATIENT
Start: 2021-07-17 | End: 2021-07-19 | Stop reason: HOSPADM

## 2021-07-17 RX ORDER — CALCIUM GLUCONATE 94 MG/ML
1 INJECTION, SOLUTION INTRAVENOUS
Status: DISCONTINUED | OUTPATIENT
Start: 2021-07-17 | End: 2021-07-19 | Stop reason: HOSPADM

## 2021-07-17 RX ORDER — MAGNESIUM SULFATE 4 G/50ML
4 INJECTION INTRAVENOUS ONCE
Status: COMPLETED | OUTPATIENT
Start: 2021-07-17 | End: 2021-07-17

## 2021-07-17 RX ORDER — IBUPROFEN 600 MG/1
600 TABLET, FILM COATED ORAL EVERY 4 HOURS PRN
Status: DISCONTINUED | OUTPATIENT
Start: 2021-07-17 | End: 2021-07-19 | Stop reason: HOSPADM

## 2021-07-17 RX ORDER — MAGNESIUM SULFATE 4 G/50ML
4 INJECTION INTRAVENOUS
Status: DISCONTINUED | OUTPATIENT
Start: 2021-07-17 | End: 2021-07-19 | Stop reason: HOSPADM

## 2021-07-17 RX ORDER — MAGNESIUM SULFATE IN WATER 40 MG/ML
2 INJECTION, SOLUTION INTRAVENOUS CONTINUOUS
Status: DISPENSED | OUTPATIENT
Start: 2021-07-17 | End: 2021-07-18

## 2021-07-17 RX ORDER — LABETALOL HYDROCHLORIDE 5 MG/ML
20-80 INJECTION, SOLUTION INTRAVENOUS EVERY 10 MIN PRN
Status: DISCONTINUED | OUTPATIENT
Start: 2021-07-17 | End: 2021-07-19 | Stop reason: HOSPADM

## 2021-07-17 RX ORDER — LABETALOL HYDROCHLORIDE 5 MG/ML
20 INJECTION, SOLUTION INTRAVENOUS ONCE
Status: COMPLETED | OUTPATIENT
Start: 2021-07-17 | End: 2021-07-17

## 2021-07-17 RX ORDER — HYDRALAZINE HYDROCHLORIDE 20 MG/ML
10 INJECTION INTRAMUSCULAR; INTRAVENOUS
Status: DISCONTINUED | OUTPATIENT
Start: 2021-07-17 | End: 2021-07-19 | Stop reason: HOSPADM

## 2021-07-17 RX ORDER — MAGNESIUM SULFATE HEPTAHYDRATE 500 MG/ML
10 INJECTION, SOLUTION INTRAMUSCULAR; INTRAVENOUS
Status: DISCONTINUED | OUTPATIENT
Start: 2021-07-17 | End: 2021-07-19 | Stop reason: HOSPADM

## 2021-07-17 RX ORDER — SODIUM CHLORIDE, SODIUM LACTATE, POTASSIUM CHLORIDE, CALCIUM CHLORIDE 600; 310; 30; 20 MG/100ML; MG/100ML; MG/100ML; MG/100ML
10-125 INJECTION, SOLUTION INTRAVENOUS CONTINUOUS
Status: DISCONTINUED | OUTPATIENT
Start: 2021-07-17 | End: 2021-07-19 | Stop reason: HOSPADM

## 2021-07-17 RX ORDER — ACETAMINOPHEN 325 MG/1
650 TABLET ORAL EVERY 4 HOURS PRN
Status: DISCONTINUED | OUTPATIENT
Start: 2021-07-17 | End: 2021-07-19 | Stop reason: HOSPADM

## 2021-07-17 RX ADMIN — LABETALOL HYDROCHLORIDE 20 MG: 5 INJECTION, SOLUTION INTRAVENOUS at 19:57

## 2021-07-17 RX ADMIN — OXYCODONE HYDROCHLORIDE 5 MG: 5 TABLET ORAL at 20:45

## 2021-07-17 RX ADMIN — ACETAMINOPHEN 650 MG: 325 TABLET ORAL at 20:46

## 2021-07-17 RX ADMIN — MAGNESIUM SULFATE HEPTAHYDRATE 4 G: 80 INJECTION, SOLUTION INTRAVENOUS at 20:23

## 2021-07-17 RX ADMIN — SODIUM CHLORIDE, POTASSIUM CHLORIDE, SODIUM LACTATE AND CALCIUM CHLORIDE 100 ML/HR: 600; 310; 30; 20 INJECTION, SOLUTION INTRAVENOUS at 21:35

## 2021-07-17 RX ADMIN — MAGNESIUM SULFATE HEPTAHYDRATE 2 G/HR: 40 INJECTION, SOLUTION INTRAVENOUS at 21:29

## 2021-07-17 RX ADMIN — LABETALOL HYDROCHLORIDE 200 MG: 200 TABLET, FILM COATED ORAL at 22:58

## 2021-07-17 RX ADMIN — IBUPROFEN 600 MG: 600 TABLET, FILM COATED ORAL at 22:59

## 2021-07-17 ASSESSMENT — ACTIVITIES OF DAILY LIVING (ADL)
FALL_HISTORY_WITHIN_LAST_SIX_MONTHS: NO
DIFFICULTY_COMMUNICATING: NO
CONCENTRATING,_REMEMBERING_OR_MAKING_DECISIONS_DIFFICULTY: NO
TOILETING_ISSUES: NO
WEAR_GLASSES_OR_BLIND: NO
DIFFICULTY_EATING/SWALLOWING: NO
WALKING_OR_CLIMBING_STAIRS_DIFFICULTY: NO
PATIENT'S_PREFERRED_MEANS_OF_COMMUNICATION: VERBAL
DOING_ERRANDS_INDEPENDENTLY_DIFFICULTY: YES
PATIENT_/_FAMILY_COMMUNICATION_STYLE: SPOKEN LANGUAGE (ENGLISH OR BILINGUAL)
DRESSING/BATHING_DIFFICULTY: NO
HEARING_DIFFICULTY_OR_DEAF: NO

## 2021-07-18 PROCEDURE — 120N000001 HC R&B MED SURG/OB

## 2021-07-18 PROCEDURE — 258N000003 HC RX IP 258 OP 636: Performed by: OBSTETRICS & GYNECOLOGY

## 2021-07-18 PROCEDURE — 250N000013 HC RX MED GY IP 250 OP 250 PS 637: Performed by: OBSTETRICS & GYNECOLOGY

## 2021-07-18 RX ADMIN — ACETAMINOPHEN 650 MG: 325 TABLET ORAL at 13:15

## 2021-07-18 RX ADMIN — IBUPROFEN 600 MG: 600 TABLET, FILM COATED ORAL at 13:15

## 2021-07-18 RX ADMIN — ACETAMINOPHEN 650 MG: 325 TABLET ORAL at 22:52

## 2021-07-18 RX ADMIN — OXYCODONE HYDROCHLORIDE 5 MG: 5 TABLET ORAL at 13:15

## 2021-07-18 RX ADMIN — ACETAMINOPHEN 650 MG: 325 TABLET ORAL at 01:02

## 2021-07-18 RX ADMIN — LABETALOL HYDROCHLORIDE 200 MG: 200 TABLET, FILM COATED ORAL at 12:00

## 2021-07-18 RX ADMIN — ACETAMINOPHEN 650 MG: 325 TABLET ORAL at 09:11

## 2021-07-18 RX ADMIN — OXYCODONE HYDROCHLORIDE 5 MG: 5 TABLET ORAL at 01:02

## 2021-07-18 RX ADMIN — ACETAMINOPHEN 650 MG: 325 TABLET ORAL at 05:03

## 2021-07-18 RX ADMIN — SODIUM CHLORIDE, POTASSIUM CHLORIDE, SODIUM LACTATE AND CALCIUM CHLORIDE 75 ML/HR: 600; 310; 30; 20 INJECTION, SOLUTION INTRAVENOUS at 18:48

## 2021-07-18 RX ADMIN — ACETAMINOPHEN 650 MG: 325 TABLET ORAL at 18:46

## 2021-07-18 RX ADMIN — OXYCODONE HYDROCHLORIDE 5 MG: 5 TABLET ORAL at 05:03

## 2021-07-18 RX ADMIN — SODIUM CHLORIDE, POTASSIUM CHLORIDE, SODIUM LACTATE AND CALCIUM CHLORIDE 100 ML/HR: 600; 310; 30; 20 INJECTION, SOLUTION INTRAVENOUS at 05:11

## 2021-07-18 RX ADMIN — OXYCODONE HYDROCHLORIDE 5 MG: 5 TABLET ORAL at 09:10

## 2021-07-18 RX ADMIN — OXYCODONE HYDROCHLORIDE 5 MG: 5 TABLET ORAL at 18:46

## 2021-07-18 RX ADMIN — OXYCODONE HYDROCHLORIDE 5 MG: 5 TABLET ORAL at 22:52

## 2021-07-18 RX ADMIN — IBUPROFEN 600 MG: 600 TABLET, FILM COATED ORAL at 19:20

## 2021-07-18 NOTE — PROGRESS NOTES
"[place] [facility] [place] increased blood pressure, post op day 3 after  delivery of a baby girl. Patient was discharged from hospital early last evening. Today she checked her blood pressure and when the diastolic was 105 she called Dr. Bustos who told her to come to the hospital. Patient states she \"feels pressure\" behind her eyes but denies HA, visual disturbances, or epigastric pain. DTR's WDL. +3 pitting edema on ankles and feet. /103, 165/101 15 min apart. Dr. Bustos notified, IV Labetalol order received. Dr. Bustos will come to evaluate patient.   "

## 2021-07-18 NOTE — PLAN OF CARE
Problem: Adult Inpatient Plan of Care  Goal: Plan of Care Review  7/17/2021 2246 by Jane Payan, RN  Outcome: Improving  7/17/2021 2245 by Jane Payan, RN  Outcome: Improving     I could not find the appropriate care plan    Sola was teary when she first got here, she was overwhelmed by events of the past week and now she is back. She is coping well. Her  and baby are with her    Blood pressures have improved since IV labetalol and Mag. Will continue to do reflexes and blood pressures per protocol.

## 2021-07-18 NOTE — PROGRESS NOTES
Assumed care of patient, report received from Lindy Travis RN. Patient resting in room with baby and . States she feels well although somewhat groggy from magnesium sulfate. States her incisional pain is a 2 on scale. Able to ambulate to bathroom independently. Breastfeeds baby frequently.

## 2021-07-18 NOTE — PROGRESS NOTES
OBGYN PROGRESS NOTE    HD # 1, admitted for post partum prececlampsia with severe features.     Subjective:  Patient is doing well since admission. Pain is currently well controlled with medications. She is currently receiving tylenol, ibuprofen and oxycodone PRN.  She is tolerating general diet. She denies fever or chills. Denies nausea or vomiting. Her HA has resolved. She denies vision changes or RUQ pain.       Objective:  BP (!) 139/92   Pulse 89   Temp 98.4  F (36.9  C) (Oral)   Resp 18   Wt 114.5 kg (252 lb 8 oz)   LMP 10/25/2020   SpO2 97%   BMI 49.31 kg/m    General: no apparent distress   Abdomen:  Soft, non distended, appropriately tender but no RUQ or epigastric pain   Ext: 1+ edema, no clonus, DTRs2+  Labs:  Lab Results   Component Value Date    HGB 9.4 (L) 2021         Assessment:  31 year old post partum day 4 s/p  delivery with post partum pre eclampsia with severe features.      PLAN  -BP currently within normal limits   -HELLP panel wnl  - continue magnesium until it has been 24 hours, will plan to turn off mag around  and continue to monitor BP overnight  - IV anti hypertensives as needed  -continue labetalol to 200mg BID  -continue to monitor closely      Elvira Bustos,   [facility]  [phone]

## 2021-07-18 NOTE — PROGRESS NOTES
"Assessment:   1.  6 day old infant currently at 6 % weight loss, on breastfeeding with some supplementation  2.  Good latch and suck but low milk transfer in office today and decreased stooling--in need of increased supplementation while mother's milk supply increases  3.  Mother with milk supply not yet established  4.  Mother recently treated for postpartum pre-eclampsia--BP wnl on antihypertensives today and reports feeling well    Plan:   1.  To continue to nurse baby on cue, 8-12 times each day.  Feed on one side until baby finishes swallowing.  Once swallowing slows, use breast compression to encourage more swallowing, but once there is no more active swallowing, and baby is either sleeping, coming off the breast, or just \"nibbling,\" it is OK to use a finger to take baby off the breast and move to the other breast.  Do the same on the other side.  Offer both breasts at each feeding.  It is more important to watch the baby than the clock!   2.  Present breast in the \"sandwich\" hold, compressing breast vertically and in line with baby's mouth, for baby to get a larger mouthful of breast and a deeper latch.  If there is feel pinching or pain, stop, use a finger to break the suction, remove baby from the breast and try again until there is no pain with nursing.  There is sometimes a little pain when the baby first begins sucking, but after the first few seconds there should be no pain--only a tugging feeling.  Do not continue with the same position if nursing is painful;  Always restart!    3.  Use good positioning for deep latch, with baby held close to body and baby's head/shoulders/hips in good alignment.  When in a seated position, use a pillow to help bring baby close to breasts, and stepstool to elevate your knees above hips.   4.  Anuj needs about 16-17 oz of milk each day to grow well. If she nurses at home as she did in the office today, about 8 times/day, she needs about 2 oz each feeding in " supplementation, using your breastmilk as your first choice and formula when the supply of pumped milk runs out. You can give this after feedings, or distributed throughout the day according to her feeding cues.  5.  Continue to pump as you have been, to have this extra milk to give to Anuj and stimulate your breasts to make more milk.  Add some breast massage and heat to your breasts to help stimulate milk supply.  6.  Reviewed normal emotional adjustments in early postpartum.  7. See pediatric provider as planned this afternoon, and lactation visit in one week.      Subjective: Sola and Francis are here today because they would like to get breastfeeding well-established--has concerns about baby's latch and her milk supply.  She had a difficult birth experience with a long induction, urgent C/S, chorio and readmission for postpartum pre-eclampsia.  She was discharged form the hospital yesterday;  Reports feeling much better.  Denies any headache, eye pressure, vision disturbances, epigastric pain.  She continues to have some edema, sean in lower extremities, but reports that her face/hands are much less swollen. She has been using a SNS at the breast for supplementation while breastfeeding, and found this useful, but has discontinued the nipple shield as baby latched better without it.    Hospital Course: Induced for gestational hypertension with 2 days of cervical ripening followed by about 24h Pitocin;  chorio during labor with fetal distress resulting in C/S.  Seen by hospital IBCLC due to LPI baby;  Assisted with pumping.  Day 2 added SNS for formula supplementation, which worked well.  Mother readmitted day 4 postpartum for severe pp pre-eclampsia requiring continued antihypertensives and magnesium sulfate and was hospitalized about 24 hours.     Mother's Relevant Med/Surg History: HTN on labetalol; PCOS; anemia    Breast Surgery: none    Breastfeeding Goals: Exclusive breastfeeding     Previous Breastfeeding  Experience: first baby    Infant's name: Anuj  Infant's bday: 21  Gestational age:37w3d  Infant's birth weight: 6 # 10.5 oz    Mode of delivery:   [facility]ediatric [facility] her permission for today's note to be forwarded to Dr. Kamara.  DILIP signed and filed in Sola's chart as Anuj has no local active pediatric chart.    Discharge weight: 6 # 5 oz       Frequency and duration of feedings: every 3 hours for about 10 min  Swallows audible per mother: yes  Numbers of feedings in 24 hours: 8  Number urines per day: 7  Number of stools per day and their color: not in last 2-3 days, last stool was seedy brownish    Supplementation: with 30 ml each feeding using SNS, and 1-2 times/day with 30 ml in bottle.  Using pumped milk or formula   Pumping: after each feeding, yielding around 10-20 ml    Objective/Physical exam:   Mother: Noticed breasts grew larger and areolas darkened during pregnancy and she noticed primary engorgement when her milk came in on day 4  Her nipples are everted, the areola is compressible, the breast is soft and full.     Sore nipples: slightly   EPDS: 10.  Sola shares that last few days have been very stressful and she has been crying, but feels she is coping.    Assessment of infant: 9.6% Weight for age percentile   Age today: 6 days  Today's weight: 6 # 3.8 oz  Amount of milk transferred from LEFT side: 0.2 oz  Amount of milk transferred from RIGHT side: not measurable    Baby has full flexion of arms and legs, normal tone, behavior is alert and active, respirations are normal, skin is normal, hydration is normal, jaw is normal size and alignment, palate is normal, frenulum is normal, baby can lateralize tongue, has adequate tongue lift, and tongue can protrude past bottom gum line.    Suck exam:  Baby has strong, coordinated suck with good  tongue cupping    Baby thrush: none   Jaundice: none      Feeding assessment: Baby can hold  suction with tongue while at the breast.     Alignment: The baby was flex relaxed. Baby's head was aligned with its trunk. Baby did face mother. Baby was in football position today.   Areolar Grasp: Baby was able to open mouth wide. Baby's lips were not pursed. Baby's lips did flange outward. Tongue was not easily visible over bottom gum. Baby had complete seal.     Areolar Compression: Baby made rhythmic motion. There were no clicking or smacking sounds. There was no severe nipple discomfort. Nipples appeared rounded after feeding.  Audible swallowing: Baby made quiet sounds of swallowing: There was no noted increase in frequency after milk ejection reflex. The milk ejection reflex is not noted today and milk supply is not yet established.     /80 (BP Location: Right arm, Patient Position: Sitting, Cuff Size: Adult Large)   Pulse 88   LMP 10/25/2020   SpO2 99%   OB History    Para Term  AB Living   1 1 1 0 0 1   SAB TAB Ectopic Multiple Live Births   0 0 0 0 1      # Outcome Date GA Lbr Drake/2nd Weight Sex Delivery Anes PTL Lv   1 Term 21 37w3d  3.02 kg (6 lb 10.5 oz) F CS-LTranv EPI N ALEXY      Complications: Fetal Intolerance, Chorioamnionitis      Name: JAVID BHATT-CLAU      Apgar1: 4  Apgar5: 8       Current Outpatient Medications:      acetaminophen (TYLENOL) 325 MG tablet, Take 2 tablets (650 mg) by mouth every 4 hours as needed for mild pain or fever, Disp: , Rfl:      cetirizine (ZYRTEC) 10 MG tablet, Take 10 mg by mouth At Bedtime, Disp: , Rfl:      ibuprofen (ADVIL/MOTRIN) 600 MG tablet, Take 1 tablet (600 mg) by mouth every 4 hours as needed for moderate pain, Disp: , Rfl:      labetalol (NORMODYNE) 200 MG tablet, Take 1 tablet (200 mg) by mouth every 12 hours, Disp: 60 tablet, Rfl: 1     oxyCODONE (ROXICODONE) 5 MG tablet, Take 1 tablet (5 mg) by mouth every 4 hours as needed for pain, Disp: 13 tablet, Rfl: 0     Prenatal Vit-Fe Fumarate-FA (PRENATAL MULTIVITAMIN W/IRON)  27-0.8 MG tablet, Take 1 tablet by mouth daily, Disp: , Rfl:   Past Medical History:   Diagnosis Date     Gestational hypertension      PCOS (polycystic ovarian syndrome)      Past Surgical History:   Procedure Laterality Date     AS KNEE SCOPE, DIAGNOSTIC        SECTION N/A 2021    Procedure:  SECTION;  Surgeon: Elvira Bustos DO;  Location: [facility] OR     No family history on file.    Time spent:  Chart review/precharting: 15 min prior to/on day of service  Face-to-face visit:   57 min   Documentation:  13 min   Total time spent on day of service: 70 min    Alejandra Mojica, EVELIN, CNM, IBCLC

## 2021-07-18 NOTE — PROVIDER NOTIFICATION
07/18/21 0800   Provider Notification   Provider Name/Title MD Juli   Method of Notification In Department   Request Evaluate - Remote   Notification Reason Status Update    Patient's DTRs absent.  DTRs on admission +1.  All other magnesium toxicity symptoms absent. No orders for magnesium levels at this time.

## 2021-07-18 NOTE — H&P
OB ADMISSION     Date: 2021  NAME: Sola Akers  : 1990  MRN: [id number]     CC: elevated blood pressures    HPI: Sola Akers is a 31 year old female s/p  delivery on 21 who's pregnancy was complicated by GHTN. She was discharged on 21 with labetalol 100mg BID but had been taking her blood pressures at home over the past day and they continued to be elevated 140-150s/. She also noticed new swelling in her lower extremities and increased pressure behind her eyes. She was told to come in for evaluation and upon arrival to labor and delivery her blood pressures were in the 160s/90-100s. She denies nausea/vomiting or RUQ pain.     OB HISTORY   OB History    Para Term  AB Living   1 1 1 0 0 1   SAB TAB Ectopic Multiple Live Births   0 0 0 0 1      # Outcome Date GA Lbr Drake/2nd Weight Sex Delivery Anes PTL Lv   1 Term 21 37w3d  3.02 kg (6 lb 10.5 oz) F CS-LTranv EPI N ALXEY      Complications: Fetal Intolerance, Chorioamnionitis      Name: MILLERFEMALE-SOLA      Apgar1: 4  Apgar5: 8       PAST MEDICAL HISTORY:  Past Medical History:   Diagnosis Date     Gestational hypertension      PCOS (polycystic ovarian syndrome)         PAST SURGICAL HISTORY:   Past Surgical History:   Procedure Laterality Date     AS KNEE SCOPE, DIAGNOSTIC        SECTION N/A 2021    Procedure:  SECTION;  Surgeon: Elvira Bustos DO;  Location: [facility] OR        SOCIAL HISTORY  Reviewed, patient denies smoking, alcohol and drug use  She is  . Father is  involved    MEDICATIONS  Current Facility-Administered Medications   Medication     calcium gluconate 10 % injection 1 g     hydrALAZINE (APRESOLINE) injection 10 mg     labetalol (NORMODYNE/TRANDATE) algorithm-medication instruction     labetalol (NORMODYNE/TRANDATE) injection 20-80 mg     lactated ringers infusion     LORazepam (ATIVAN) injection 2 mg     magnesium sulfate 2 g in water  intermittent infusion     magnesium sulfate 4 g in 50 mL sterile water (premade)     magnesium sulfate 4 g in 50 mL sterile water (premade)     magnesium sulfate infusion     magnesium sulfate injection 10 g       ALLERGIES  Allergies   Allergen Reactions     Bee Venom Anaphylaxis     Seafood Anaphylaxis     Penicillins Rash     Sulfa Drugs Rash       ROS: otherwise negative except what is stated in HPI.     PHYSICAL EXAM   BP (!) 156/92   LMP 10/25/2020    Gen: no acute distress, resting comfortably   CV: acyanotic   Heart: regular rate and rhythm   Pulm: unlabored respirations,   Abd: gravid, soft, nontender   Extremities: soft, nontender +1 edema of bilateral LE, 1+ DTRs bilaterally, no clonus      LABS  HELLP panel pending     IMPRESSION  31 year old post partum day 3 s/p  delivery with post partum pre eclampsia with severe features.     PLAN  - Admit to hospital   - HELLP panel drawn and pending  - due to severely elevated BP on arrival, new onset swelling and HA decision made to admit patient for magnesium prophylaxis and close BP monitoring.  -Magnesium Sulfate 4gm bolus/2gm per hour x 24 hours  - IV anti hypertensives as needed  -will increase PO labetalol to 200mg BID  -continue to monitor closely    Elvira Bustos DO  [facility]  [phone]

## 2021-07-19 VITALS
HEART RATE: 85 BPM | BODY MASS INDEX: 49.31 KG/M2 | SYSTOLIC BLOOD PRESSURE: 119 MMHG | TEMPERATURE: 98.5 F | WEIGHT: 252.5 LBS | DIASTOLIC BLOOD PRESSURE: 81 MMHG | RESPIRATION RATE: 18 BRPM | OXYGEN SATURATION: 96 %

## 2021-07-19 PROCEDURE — 250N000013 HC RX MED GY IP 250 OP 250 PS 637: Performed by: OBSTETRICS & GYNECOLOGY

## 2021-07-19 RX ORDER — LABETALOL 200 MG/1
200 TABLET, FILM COATED ORAL EVERY 12 HOURS
Qty: 60 TABLET | Refills: 1 | Status: ON HOLD | OUTPATIENT
Start: 2021-07-20 | End: 2023-10-11

## 2021-07-19 RX ORDER — ACETAMINOPHEN 325 MG/1
650 TABLET ORAL EVERY 4 HOURS PRN
COMMUNITY
Start: 2021-07-19 | End: 2021-07-27

## 2021-07-19 RX ORDER — LABETALOL 200 MG/1
200 TABLET, FILM COATED ORAL EVERY 12 HOURS
Qty: 60 TABLET | Refills: 1 | Status: SHIPPED | OUTPATIENT
Start: 2021-07-20 | End: 2021-07-19

## 2021-07-19 RX ORDER — IBUPROFEN 600 MG/1
600 TABLET, FILM COATED ORAL EVERY 4 HOURS PRN
COMMUNITY
Start: 2021-07-19 | End: 2021-07-27

## 2021-07-19 RX ADMIN — ACETAMINOPHEN 650 MG: 325 TABLET ORAL at 03:00

## 2021-07-19 RX ADMIN — IBUPROFEN 600 MG: 600 TABLET, FILM COATED ORAL at 05:00

## 2021-07-19 RX ADMIN — OXYCODONE HYDROCHLORIDE 5 MG: 5 TABLET ORAL at 03:00

## 2021-07-19 RX ADMIN — LABETALOL HYDROCHLORIDE 200 MG: 200 TABLET, FILM COATED ORAL at 00:00

## 2021-07-19 RX ADMIN — ACETAMINOPHEN 650 MG: 325 TABLET ORAL at 07:30

## 2021-07-19 RX ADMIN — LABETALOL HYDROCHLORIDE 200 MG: 200 TABLET, FILM COATED ORAL at 11:49

## 2021-07-19 RX ADMIN — OXYCODONE HYDROCHLORIDE 5 MG: 5 TABLET ORAL at 14:28

## 2021-07-19 RX ADMIN — IBUPROFEN 600 MG: 600 TABLET, FILM COATED ORAL at 10:21

## 2021-07-19 RX ADMIN — ACETAMINOPHEN 650 MG: 325 TABLET ORAL at 11:49

## 2021-07-19 RX ADMIN — IBUPROFEN 600 MG: 600 TABLET, FILM COATED ORAL at 00:00

## 2021-07-19 RX ADMIN — IBUPROFEN 600 MG: 600 TABLET, FILM COATED ORAL at 19:10

## 2021-07-19 RX ADMIN — ACETAMINOPHEN 650 MG: 325 TABLET ORAL at 16:34

## 2021-07-19 RX ADMIN — OXYCODONE HYDROCHLORIDE 5 MG: 5 TABLET ORAL at 19:10

## 2021-07-19 RX ADMIN — IBUPROFEN 600 MG: 600 TABLET, FILM COATED ORAL at 14:28

## 2021-07-19 NOTE — DISCHARGE SUMMARY
OB Discharge Summary      Date:  2021    Name:  Sola Akers  :  1990  MRN:  [id number]      Admission Date:  2021  Delivery Date:  2021  Gestational Age at Delivery:  37w3d  Discharge Date:  2021    Principal Diagnosis:    Patient Active Problem List   Diagnosis     Gestational hypertension     Pre-eclampsia in postpartum period     Other Diagnosis:  S/P Magnesium Sulfate. S/P  delivery on 21, discharged on 21 and readmitted     Conditions complicating Pregnancy: BMI 40, GHTN-on Labetalol 100mg BID, Polyhydromnios (NEYMAR at 15.9 on 21 prior NEYMAR at 30 on 21) EFW at 92% on .         Procedure(s) Performed:  None     Indication for :  Primary Low Transverse  on           1. Intrauterine pregnancy at 37w3d                                           2. gHTN                                           3.suspected chorioamnionitis                                           4. Fetal intolerance to labor  Indication for Induction:  g HTN     Condition at Discharge: Stable. Sola denies any headache, vision changes or RUQ pain.  BP stable on Labetalol 200 mg po BID. She desires to go home. Reviewed with Dr. Aguilar. Pt to discharge to home if BP continues to be WNL. Likely discharge this evening after 24 hrs of Magnesium Sulfate.   Update: Per Dr. Aguilar, pt ok to discharge prior to 24 hrs as her BPs have been WNL.      Discharge Medications:    Sola Akers   Home Medication Instructions WING:[id number]    Printed on:21 5189   Medication Information                      acetaminophen (TYLENOL) 325 MG tablet  Take 2 tablets (650 mg) by mouth every 4 hours as needed for mild pain or fever             cetirizine (ZYRTEC) 10 MG tablet  Take 10 mg by mouth At Bedtime                          labetalol (NORMODYNE) 200 MG tablet  Take 1 tablet (200 mg) by mouth every 12 hours             oxyCODONE (ROXICODONE) 5 MG tablet  Take 1 tablet (5  mg) by mouth every 4 hours as needed for pain             Prenatal Vit-Fe Fumarate-FA (PRENATAL MULTIVITAMIN W/IRON) 27-0.8 MG tablet  Take 1 tablet by mouth daily                 Discharge Plan:    Follow up with /TOMASM:  [facility] on  or  for BP check and 2 weeks for incision check.    Patient Instructions:      Physical activity: No lifting >15lbs and no driving while on narcotics.     Diet:  Regular     Medication:  See above list.     Other:        Physician/CNM:  EVELIN Lemon CNM    Name:  Sola Akers  :  1990  MRN:  [id number]

## 2021-07-19 NOTE — PROGRESS NOTES
CNM on call updated that patient's bag of Magnesium Sulfate was completed at 2005. CNM stated that it is ok to discontinue Magnesium now instead of 2030 due to bag being complete.

## 2021-07-20 ENCOUNTER — ALLIED HEALTH/NURSE VISIT (OUTPATIENT)
Dept: MIDWIFE SERVICES | Facility: CLINIC | Age: 31
End: 2021-07-20
Payer: COMMERCIAL

## 2021-07-20 VITALS — SYSTOLIC BLOOD PRESSURE: 122 MMHG | DIASTOLIC BLOOD PRESSURE: 80 MMHG | HEART RATE: 88 BPM | OXYGEN SATURATION: 99 %

## 2021-07-20 DIAGNOSIS — O92.79 INSUFFICIENT LACTATION: ICD-10-CM

## 2021-07-20 PROCEDURE — 99205 OFFICE O/P NEW HI 60 MIN: CPT | Performed by: ADVANCED PRACTICE MIDWIFE

## 2021-07-20 ASSESSMENT — EDINBURGH POSTNATAL DEPRESSION SCALE (EPDS)
THINGS HAVE BEEN GETTING ON TOP OF ME: YES, SOMETIMES I HAVEN'T BEEN COPING AS WELL AS USUAL
THE THOUGHT OF HARMING MYSELF HAS OCCURRED TO ME: NEVER
I HAVE LOOKED FORWARD WITH ENJOYMENT TO THINGS: AS MUCH AS I EVER DID
I HAVE BLAMED MYSELF UNNECESSARILY WHEN THINGS WENT WRONG: YES, MOST OF THE TIME
I HAVE BEEN ANXIOUS OR WORRIED FOR NO GOOD REASON: YES, SOMETIMES
TOTAL SCORE: 10
I HAVE BEEN ABLE TO LAUGH AND SEE THE FUNNY SIDE OF THINGS: AS MUCH AS I ALWAYS COULD
I HAVE FELT SCARED OR PANICKY FOR NO GOOD REASON: NO, NOT MUCH
I HAVE BEEN SO UNHAPPY THAT I HAVE HAD DIFFICULTY SLEEPING: NOT AT ALL
I HAVE BEEN SO UNHAPPY THAT I HAVE BEEN CRYING: ONLY OCCASIONALLY
I HAVE FELT SAD OR MISERABLE: NOT VERY OFTEN

## 2021-07-20 NOTE — PLAN OF CARE
Problem: Hypertensive Disorders in Pregnancy  Goal: Maternal-Fetal Stabilization  Outcome: Adequate for Discharge     BP within normal limits at the time of discharge. Other vitals Stable. Assessments within normal limits. Discharge paperwork reviewed with pt. Reviewed appointments, danger signals. Pt is discharged.

## 2021-07-20 NOTE — PATIENT INSTRUCTIONS
"  Plan:   1.  To continue to nurse baby on cue, 8-12 times each day.  Feed on one side until baby finishes swallowing.  Once swallowing slows, use breast compression to encourage more swallowing, but once there is no more active swallowing, and baby is either sleeping, coming off the breast, or just \"nibbling,\" it is OK to use a finger to take baby off the breast and move to the other breast.  Do the same on the other side.  Offer both breasts at each feeding.  It is more important to watch the baby than the clock!   2.  Present breast in the \"sandwich\" hold, compressing breast vertically and in line with baby's mouth, for baby to get a larger mouthful of breast and a deeper latch.  If there is feel pinching or pain, stop, use a finger to break the suction, remove baby from the breast and try again until there is no pain with nursing.  There is sometimes a little pain when the baby first begins sucking, but after the first few seconds there should be no pain--only a tugging feeling.  Do not continue with the same position if nursing is painful;  Always restart!    3.  Use good positioning for deep latch, with baby held close to body and baby's head/shoulders/hips in good alignment.  When in a seated position, use a pillow to help bring baby close to breasts, and stepstool to elevate your knees above hips.   4.  Anuj needs about 16-17 oz of milk each day to grow well. If she nurses at home as she did in the office today, about 8 times/day, she needs about 2 oz each feeding in supplementation, using your breastmilk as your first choice and formula when the supply of pumped milk runs out. You can give this after feedings, or distributed throughout the day according to her feeding cues.  5.  Continue to pump as you have been, to have this extra milk to give to Anuj and stimulate your breasts to make more milk.   6. See pediatric provider as planned this afternoon, and consider follow up lactation visit in one week.  " "    _______    This is a great video that shows a baby nursing well at the breast, and how to tell when baby is actively swallowing:      https://globalhealthmedia.org/portfolio-items/is-your-baby-getting-enough-milk/?abevsnuuoRlwi=255%2C94%2C13%2C23%2C65    _______________    For excellent video on positioning for good latch:      Https://[employer]/abcs/  (click on \"attachment\")    ____________    Two times that are often great for pumping are either first thing in the morning when your breasts are most full (because many women find they are particularly full at this time), or pumping from one breast if your baby has nursed primarily on the other at that feeding.  If you pump in the morning, you could do it either right after feeding, because baby may not completely empty you, or between feedings--if, say, there is a feeding at 6 am and then baby goes back to sleep until like 9, you could pump at 7 or 8.  I also really like the technique of doing one breast: if there are times when your baby nurses on just one breast per feeding, pump one while nursing on the other.  This works great for obtaining a good amount of milk, because we get a better letdown to our baby than we do to the pump.  Or nurse, and then do the breast that baby left off with, because that one will often still be somewhat full, especially in the morning.      As far as time, pump until the flow of milk slows significantly or almost stops.  For most women this is around 15 minutes or so (both breasts together, or 30 min, if you are doing them separately).  You can increase the amount of milk you obtain by doing some breast massage while you pump, which is easier if you are either pumping the breasts separately or using some kinds of hands-free pumping set up (either a special bra that you buy, or just using a sports bra that you cut some slits in!).  Finishing up by doing some hand expression also helps to increase the amount of milk you " "get.  There is an excellent video on this technique here, made by Taylor Poe MD, pediatrician and breastfeeding specialist:  https://med.[place].[facility]/newborns/professional-education/breastfeeding/maximizing-milk-production.html    You don't need to worry a lot about making sure there is still \"enough left\" in the breasts for the next feeding, because our breasts are always making milk--there is always something there.   If your baby doesn't get as much as they would like at the next feeding, they'll just nurse again sooner;  they won't go hungry!   It can be a time-saver, though, to pump right after the feeding, because then you kind of attach the pumping to the feeding.  Also, if at any point you are trying to pump to increase milk supply, this style of pumping is helpful for that:  It kind of helps your body think that you are having longer feeding sessions and it steps up production accordingly.    _______    Good breastfeeding resources:  www.[employer]  Https://www.[employer]/blog/    The Baby Book: Dr. Roberts and Jeannette Rasmussen  The Womanly Art of Breastfeeding by [facility]    For help with using baby carriers:  https://babywearingtwincities.org/      "

## 2021-07-26 NOTE — PROGRESS NOTES
Assessment:   1.  13 day old infant gaining weight well on breastfeeding with formula supplementation:  Returned to birthweight  2.  Good latch and suck but continues to have low milk transfer:  In need of continued supplementation  3.  Mother with low milk supply of indeterminate cause:  Risk factors include PCOS, subclinical hypothyroidism, BMI 49, HTN  4.  Mother treated for postpartum pre-eclampsia--BP wnl on antihypertensives today and reports feeling well    Plan:   1. Anuj needs about 18 oz of milk each day to grow well. Right now she needs this amount in supplementation.  This is about 2 1/2 oz per feeding, using your breastmilk as your first choice and formula when the supply of pumped milk runs out.  You can give this after feedings, or distributed throughout the day according to her feeding cues.  2. Continue to offer her the breast as often as she cues; giving bottles at nighttime is fine.  3.  You can consider using some dietary supplements if you like, to try to increase milk supply;  Discussed moringa.  Given written info on increasing milk supply.  Also discussed use of Reglan as galactogogue--pt has no history of depression.  Will consider.     4.  Discussed labwork for evaluating low milk supply:  will check thyroid today.  Can also consider testing prolactin if desired;  Reviewed advantages/disadvantages.  Will consider.   5.  Discussed supplementation methods:  If you would like to move to more bottlefeeding rather than SNS, that is fine.  Use the paced feeding method.  6.  If you would like to continue with the SNS, you could consider a more permanent type of SNS, such as the Medela Supplemental Nursing System or the Lact-Aid. Given resource info.  7.  See Dr. Kamara as planned next week, and lactation in one week.    Subjective: Lolita are here today for a follow-up visit. They were seen one week ago for assistance in establishing breastfeeding after difficult birth with maternal  readmission for pre-eclampsia.  At that time Ismaels milk was not fully in, baby was continuing to lose weight and had reduced output, so a plan was made for pumping and supplementation. They had been using an SNS.  Today Sola reports that they are continuing to supplement using the SNS, although have also added some bottles.  Notes that baby seems to need 3 oz each feeding rather than 2 oz.   Breastfeeding every 3 hours during the day followed by supplementation, and giving pumped milk in bottles at night.  Has been taking some electrolyte drinks and lactation bars to attempt to increase milk supply;  Not using any other supplements.    Hospital Course: Induced for gestational hypertension with 2 days of cervical ripening followed by about 24h Pitocin;  chorio during labor with fetal distress resulting in C/S.  Seen by hospital IBCLC due to LPI baby;  Assisted with pumping.  Day 2 added SNS for formula supplementation, which worked well.  Mother readmitted day 4 postpartum for severe pp pre-eclampsia requiring continued antihypertensives and magnesium sulfate and was hospitalized about 24 hours.     Mother's Relevant Med/Surg History: HTN on labetalol; PCOS--had been on Metformin prior to conception--stopped with pregnancy; anemia.  Subclinical hypothyroidism--no meds    Breast Surgery: none    Breastfeeding Goals: Exclusive breastfeeding     Previous Breastfeeding Experience: first baby    Infant's name: Anuj Berman's bday: 21  Gestational age:37w3d  Infant's birth weight: 6 # 10.5 oz    Mode of delivery:   Pediatric Provider: Dr. Kimberly Kamara, [facility].  Sola gives her permission for today's note to be forwarded to Dr. Kamara.  DILIP signed and filed in Sola's chart as Anuj has no local active pediatric chart.    Discharge weight: 6 # 5 oz   Recent weight on this scale 21:  6 # 3.8 oz      Frequency and duration of feedings:  At breast every 3 hours for about 10 min during  the day;  bottlefeeding q3h at night  Swallows audible per mother: yes, but using SNS  Numbers of feedings in 24 hours: 8  Number urines per day: 8-10  Number of stools per day and their color: 2-3 day, seedy yellow    Supplementation: 2-3 oz each feeding using SNS.  Using pumped milk or formula   Pumping:  after each feeding, yielding around 25-30 ml, yielding around 4 oz/day.  Also few ml from [place].    Objective/Physical exam:   Mother: Noticed breasts grew larger and areolas darkened during pregnancy and she noticed primary engorgement when her milk came in on day 4  Her nipples are everted, the areola is compressible, the breast is soft and full.     Sore nipples: no   EPDS: not completed today;  Reports she is feeling some stress but does feel manageable    Assessment of infant: 10.89% Weight for age percentile   Age today: 2 weeks tomorrow  Today's weight: 6 # 11.6 oz  Amount of milk transferred from LEFT side: 0.2 oz  Amount of milk transferred from RIGHT side: not measurable    Baby has full flexion of arms and legs, normal tone, behavior is alert and active, respirations are normal, skin is normal, hydration is normal, jaw is normal size and alignment, palate is normal, frenulum is normal, baby can lateralize tongue, has adequate tongue lift, and tongue can protrude past bottom gum line.    Suck exam:  Baby has strong, coordinated suck with good  tongue cupping    Baby thrush: none   Jaundice: none      Feeding assessment: Baby can hold suction with tongue while at the breast.     Alignment: The baby was flex relaxed. Baby's head was aligned with its trunk. Baby did face mother. Baby was in football position today.   Areolar Grasp: Baby was able to open mouth wide. Baby's lips were not pursed. Baby's lips did flange outward. Tongue was visible over bottom gum. Baby had complete seal.     Areolar Compression: Baby made rhythmic motion. There were no clicking or smacking sounds. There was no severe nipple  discomfort. Nipples appeared rounded after feeding.    Audible swallowing: Baby made quiet sounds of swallowing: There was no noted increase in frequency after milk ejection reflex. The milk ejection reflex is not noted today and milk supply is low.     /78 (BP Location: Right arm, Patient Position: Sitting, Cuff Size: Adult Large)   Pulse 79   LMP 10/25/2020   SpO2 98%   OB History    Para Term  AB Living   1 1 1 0 0 1   SAB TAB Ectopic Multiple Live Births   0 0 0 0 1      # Outcome Date GA Lbr Drake/2nd Weight Sex Delivery Anes PTL Lv   1 Term 21 37w3d  3.02 kg (6 lb 10.5 oz) F CS-LTranv EPI N ALEXY      Complications: Fetal Intolerance, Chorioamnionitis      Name: SADE BHATT      Apgar1: 4  Apgar5: 8       Current Outpatient Medications:      cetirizine (ZYRTEC) 10 MG tablet, Take 10 mg by mouth At Bedtime, Disp: , Rfl:      labetalol (NORMODYNE) 200 MG tablet, Take 1 tablet (200 mg) by mouth every 12 hours, Disp: 60 tablet, Rfl: 1     Prenatal Vit-Fe Fumarate-FA (PRENATAL MULTIVITAMIN W/IRON) 27-0.8 MG tablet, Take 1 tablet by mouth daily, Disp: , Rfl:   Past Medical History:   Diagnosis Date     Gestational hypertension      PCOS (polycystic ovarian syndrome)      Past Surgical History:   Procedure Laterality Date     AS KNEE SCOPE, DIAGNOSTIC        SECTION N/A 2021    Procedure:  SECTION;  Surgeon: Elvira Bustos DO;  Location: [facility] OR     No family history on file.    Time spent:  Chart review/prechartin min prior to/on day of service  Face-to-face visit:   57 min   Documentation:  15 min   Total time spent on day of service: 72 min    Alejandra Mojica, EVELIN, CNM, IBCLC

## 2021-07-27 ENCOUNTER — ALLIED HEALTH/NURSE VISIT (OUTPATIENT)
Dept: MIDWIFE SERVICES | Facility: CLINIC | Age: 31
End: 2021-07-27
Payer: COMMERCIAL

## 2021-07-27 VITALS — DIASTOLIC BLOOD PRESSURE: 78 MMHG | HEART RATE: 79 BPM | OXYGEN SATURATION: 98 % | SYSTOLIC BLOOD PRESSURE: 126 MMHG

## 2021-07-27 VITALS — WEIGHT: 253 LBS | BODY MASS INDEX: 47.77 KG/M2 | HEIGHT: 61 IN

## 2021-07-27 DIAGNOSIS — O92.79 INSUFFICIENT LACTATION: Primary | ICD-10-CM

## 2021-07-27 LAB — TSH SERPL DL<=0.005 MIU/L-ACNC: 1.43 UIU/ML (ref 0.3–5)

## 2021-07-27 PROCEDURE — 99417 PROLNG OP E/M EACH 15 MIN: CPT | Performed by: ADVANCED PRACTICE MIDWIFE

## 2021-07-27 PROCEDURE — 99215 OFFICE O/P EST HI 40 MIN: CPT | Performed by: ADVANCED PRACTICE MIDWIFE

## 2021-07-27 PROCEDURE — 84443 ASSAY THYROID STIM HORMONE: CPT | Performed by: ADVANCED PRACTICE MIDWIFE

## 2021-07-27 PROCEDURE — 36415 COLL VENOUS BLD VENIPUNCTURE: CPT | Performed by: ADVANCED PRACTICE MIDWIFE

## 2021-07-27 NOTE — PROGRESS NOTES
Pt arrived to [facility] for 2nd dose of betamethasone, NST, and BP check due to gHTN.  NST reactive with moderate varability and accels present, no decels noted per RN /86. BMZ given. Pt will discharge home and f/u in clinic Thursday

## 2021-07-27 NOTE — PROGRESS NOTES
1105 L Salazar at bedside. States reviewed labs as well as blood pressures. Pt ok to discharge to home on labetalol. Pt states she understands discharge instructions.

## 2021-07-27 NOTE — PROGRESS NOTES
Sierra Morocho CNM notified of reactive NST as described in previous note; bp 139/87, 132/78, 133/86. Agreed with plan for pt to discharge to home.

## 2021-07-27 NOTE — PROGRESS NOTES
"Please see \"Imaging\" tab under Chart Review for full report.  This ultrasound was performed in the [facility], and may be located under Care Everywhere.    Dali Rojas MD  Maternal Fetal Medicine    "

## 2021-07-27 NOTE — PROGRESS NOTES
Pt here for 2nd dose of betamethasone, NST, and BP monitoring. NST reactive with moderate varability and accels present, no decels noted. BP wnl. Pt denies HA, visual changes, RUQ pain. Reflexes wnl without clonus. Sierra Morocho, [facility] paged to notify of bp and nst results. Pt discharged to home. Continue to await CNM return call.

## 2021-07-27 NOTE — PROGRESS NOTES
"S: Sola presents to unit from clinic for preeclampsia evaluation. Pt is a 32 yo  at 33w4d. Pt's pregnancy has been complicated by obesity (BMI 49). Pt's blood pressure today in clinic was 140s/100s and pt reported feeling \"fuzzy vision\". Pt denies headache and/or epigastric pain.    O:    Vitals:    21 1020   BP: 143/77   Patient Position: Semi-girard   Cuff Size: Adult Regular   Resp: 18   Temp: 99.5  F (37.5  C)   TempSrc: Oral   SpO2: 95%   Weight: (!) 253 lb (114.8 kg)   Height: 5' 0.75\" (1.543 m)     Recent Results (from the past 24 hour(s))   Protein/Creatinine Ratio, Urine Random   Result Value Ref Range     Protein, Random Urine 10 mg/dL    Creatinine, Urine 71.3 mg/dL    Protein/Creatinine Ratio, Random Urine 0.14    ALT (current @ALT(SGPT)   Result Value Ref Range    ALT 21 0 - 45 U/L   AST (current @AST(SGOT)   Result Value Ref Range    AST 14 0 - 40 U/L   INR   Result Value Ref Range    INR 0.98 0.90 - 1.10   APTT(PTT)   Result Value Ref Range    PTT 28 24 - 37 seconds   HM2(CBC w/o Differential)   Result Value Ref Range    WBC 10.8 4.0 - 11.0 thou/uL    RBC 3.83 3.80 - 5.40 mill/uL    Hemoglobin 11.6 (L) 12.0 - 16.0 g/dL    Hematocrit 34.7 (L) 35.0 - 47.0 %    MCV 91 80 - 100 fL    MCH 30.3 27.0 - 34.0 pg    MCHC 33.4 32.0 - 36.0 g/dL    RDW 12.8 11.0 - 14.5 %    Platelets 292 140 - 440 thou/uL    MPV 8.8 8.5 - 12.5 fL   Type and Screen   Result Value Ref Range    ABORh A POS    Creatinine   Result Value Ref Range    Creatinine 0.63 0.60 - 1.10 mg/dL    GFR MDRD Af Amer >60 >60 mL/min/1.73m2    GFR MDRD Non Af Amer >60 >60 mL/min/1.73m2   Uric Acid   Result Value Ref Range    Uric Acid 4.7 2.0 - 7.5 mg/dL     FHT: category 1  CTX: none  SVE: deferred  DTRs: 2+ bilaterally  Clonus: absent    A: SIUP at 33w4d, gestational hypertension    P: Reviewed blood pressures of 140-153/77-85 and labs with Dr. King. Will start pt on 100 mg PO labetalol two times a day. Pt to discharge home and f/u in " clinic on Monday.    EVELIN Blank,CNM

## 2021-07-27 NOTE — PROGRESS NOTES
ABOUT YOUR STRESS TEST  Carlos Pennington MD ordered a treadmill stress test for you.  This will be done at [facility]-Cardiology Department.     Please call within 24 hours if you are not able to keep your appointments.      THE RESTING INJECTION    Arrive on _____________ at _____________.  Do not eat for 2 hours prior to appointment.  You may drink juice or water.  There will be a one hour time span between the injection and your images.  Total visit time will be approximately 1.5 hours.      The Stress Test    Arrive on ____________ at _____________.  Do not smoke or drink caffeine and or any decaffeinated products for 24 hours prior to the test.  This includes any coffees, teas, sodas, any foods and drinks containing chocolate, Excedrin, and Anacin.  The test may need to be rescheduled if you do not follow these instructions.    -Do not eat for 2 hours before your appointment.  You may drink water or juice.     -Wear loose comfortable clothing with good walking shoes.  Do not wear pantyhose, flip-flop shoes or slippers.  You may be walking uphill on a treadmill.      -Do not use lotions or powder on the day of the testing.  Deodorant is okay.     Total visit time will be approximately 2 hours.     DO NOT TAKE THE FOLLOWING MEDICATIONS        3. Nitrates (nitroglycerin). Do not take for 24 hours before your test.     OTHER INSTRUCTIONS        Per Cardiology Department Protocol, to ensure our patient's safety and privacy, we do not allow family members in our testing rooms.  If you and your family members have any questions or concerns regarding the testing, we would be happy to answer your questions by phone or prior to the patient leaving the waiting room.    Phone- [phone]    Patient Education     Transthoracic (Surface) Echocardiography (Echo)     During an echo, images of your heart appear on a monitor.   An echocardiogram (echo) is an imaging test. It helps your doctor evaluate your heart.  "Please see \"Imaging\" tab under Chart Review for full report.  This ultrasound was performed in the [facility], and may be located under Care Everywhere.    Emely Diaz MD  Maternal Fetal Medicine    " This test:  · Is safe and painless.  · Can be done in a hospital, test center, or doctor’s office.  · Bounces harmless sound waves (ultrasound) off the heart. A transducer (device that looks like a microphone) is used.  · Helps show the size of your heart. It also helps show the health of the heart’s chambers and valves.  Before your echo  · Discuss any questions or concerns you have with your doctor.  · Mention any over-the-counter or prescription medications, herbs, or supplements you’re taking.  · Allow extra time for checking in.  · Wear a 2-piece outfit for the test. You may be asked to remove clothing and jewelry from the waist up. If so, you’ll be given a short hospital gown.  During your echo  · Small pads (electrodes) are placed on your chest to monitor your heartbeat.  · A transducer coated with cool gel is moved firmly over your chest. This device creates the sound waves that make images of your heart.  · At times, you may be asked to exhale and hold your breath for a few seconds. Air in your lungs can affect the images.  · The transducer may also be used to do a Doppler study. This test measures the direction and speed of blood flowing through the heart. During the test, you may hear a “whooshing” sound. This is the sound of blood flowing through the heart.  · The images of your heart are stored electronically. This is so your doctor can review them later.  After your echo  · Return to normal activity unless your health care provider tells you otherwise.  · Be sure to keep follow-up appointments.  Your test results  Your doctor will discuss your test results with you during a future office visit. The test results help the doctor plan your treatment and any other tests that are needed.  © 3340-3080 [employer]. [address] [place] [address], [place] [address]. All rights reserved. This information is not intended as a substitute for professional medical care. Always follow your healthcare  professional's instructions.

## 2021-07-27 NOTE — PROGRESS NOTES
Pt  Presents ambulatory to [facility] states that she was seen in clinic and was told to come to the hospital because her blood pressure was up in clinic. Pt as lreports headache and blurred vision .pt accompanied by . Pt placed on monitor.    1010 CNMW update on blood pressure 143/77. CNMW at bedside.

## 2021-07-27 NOTE — PATIENT INSTRUCTIONS
1. Anuj needs about 18 oz of milk each day to grow well. Right now she needs this amount in supplementation.  She needs about 2 1/2 oz per feeding in supplementation, using your breastmilk as your first choice and formula when the supply of pumped milk runs out.  You can give this after feedings, or distributed throughout the day according to her feeding cues.  2. Continue to offer her the breast as often as she cues; giving bottles at nighttime is fine.  3.  You can consider using some dietary supplements if you like to try to increase milk supply;  Reglan is also an option.  4.  We will check thyroid today;  Could also consider checking prolactin in the future if desired.  5.  If you would like to move to more bottlefeeding rather than SNS, that is fine.  Just use the paced feeding method.  6.  If you would like to continue with the SNS, you could consider a more permanent type of SNS, such as the [employer] Supplemental Nursing System or the Lact-Aid.  7.  See Dr. Kamara as planned next week, and lactation in one week.        -------------------------------------------------------------------------------------------------  Information for breastfeeding families on Increasing breastmilk supply     Frequent stimulation of the breasts, by breastfeeding or by using a breast pump, during the first few days and weeks, is essential to establish an abundant breastmilk supply. If you find your milk supply is low, try the following recommendations. If you are consistent you will likely see an improvement within a few days. Although it may take a month or more to bring your supply up to meet your baby's needs, you will see steady, gradual improvement. You will be glad that you put the time and effort into breastfeeding and so will your baby.     More breast stimulation:  the most important thing!  --Breastfeed more often, at least 8-12 times per 24 hours.   --Discontinue the use of a pacifier (so that when the baby wants to  "suck, they are stimulating the breasts for milk production)  --Try to get in \"one more feeding\" before you go to sleep, even if you have to wake the baby.  --Offer both breasts at each feeding  --\"Switch nursing:\" using each breast twice or three times in a feeding, and using different positions  --\"Top up feeds\" give a short feeding in 10-20 minutes if baby seems hungry  -- Remove milk well by massaging breasts while the baby is feeding  --Try breast compression - pushing milk to baby during a feeding    Avoid these things that are known to reduce breastmilk supply  --Smoking  --Caffeine  --Birth control pills and injections  --Decongestants, antihistamines  --Severe weight loss diets  --Mints, parsley, claudia in excessive amounts    Use a breast pump  --Consider use of a hospital grade breast pump with a double kit  --Pump after feedings or between feedings.  Remember that shorter, more frequent pumping sessions are more helpful than longer sessions that happen less frequently.  Anytime you can squeeze a little time in is helpful!  --Rest 10-15 minutes prior to pumping, eat and drink something.  Be nice to yourself!  During this time trying applying warmth to your breasts and massaging them gently before beginning to pump  --Do hand expression after pumping. This can help bring out more milk, as well as offer extra breast stimulation.  --Try \"power pumping\" for 2 or 3 days. Pumping 12 x a day after feedings, even for a short time. Or, try an hour of pumping for 10min, resting for 10 min, then pumping for another 10 min, etc.,  for a few times a day.     Condition your let-down reflex  --Play relaxing music  --Imagine your baby, look at pictures of your baby, smell baby clothing or baby powder  --Watch videos of your baby  --Alternatively, if thinking about your baby and their need for milk is stressful instead of relaxing, do something completely different!  Call a friend, play a game, listen to a podcast or a " "meditation  --Always pump in the same quiet, relaxed place, set up a routine  --Do slow, deep, relaxed breathing, relax your shoulders    Mother care  --Reduce stress and activity, get help  --Increase fluid intake, but just to thirst.  More water doesn't magically turn into more milk!  --Eat nutritious meals, continue to take prenatal vitamins  --Back rubs stimulate nerves that serve the breasts (central part of the spine)  --Increase skin-to-skin holding time with your baby, relax together  --Take a warm, bath, read, meditate, and empty your mind of tasks that need to be done    Herbs, food and supplements   --These may offer help to some women, but frequent milk removal helps much more!  Supplements are not a substitute.  --Eid's yeast: 3 Tablespoons daily, increase by 1/2 teaspoon daily until results are seen  --Moringa (also called malunggay):  this is a leaf that is commonly eaten in [place], and has been shown in a number of studies to increase milk supply.  In [place] it is found in powder form, often sold in capsules.  It is sold under a number of brand names. A common dose is 250-350 mg three times daily.  --Sources for moringa/malunggay for helping with milk supply:    Brands containing or comprised of moringa:  Go-Lacta  Motherlove Herbal Tincture  [employer] \"Adoptive Lactation Milk-in\"  [employer]atur[employer] \"Milk A-Plenty\" ([place])  Milkapalooza or [employer] Cow by [employer]  Lush Leche or Milk Machine by Euphoric Herbal  (Or can purchase just moringa itself from [employer] or [employer])     --Fenugreek:  Doses of 3-5 capsules (580-610 mg each) three times per day are commonly recommended. Avoid fenugreek if you are diabetic, hypoglycemic, asthmatic or allergic to peanuts or other legumes or beans. Taken as directed, it may cause a faint maple body odor. That is to be expected and means that the herb is doing it's job.   --Torbangun:  a leaf used in [facility] for helping " with milk supply.  A few studies have found this to be helpful.  Several companies sell this in compounds for increasing mother's milk.  --Shatavari:  a type of asparagus found in [place], also found to help with milk supply.  Available in several compounds made by different companies.  --Oatmeal:  try a bowl daily.  Barley and quinoa have also been found to be helpful.  --Calcium supplement:  this seems to be particularly helpful for those women who note a decrease in milk supply around their menstrual cycles.  Take 1500 mg of calcium with 750 mg of magnesium daily from midcycle through your period.  --Blessed thistle, goat's rue, or other herbs or beverages such as Mother's Milk Tea taken as directed on the package. Reliable sources of herbs and herbal blends are Motherlove Herbals, [employer]eria Her[employer] and Legendairy Milk.  --Lactation cookies:  these are very expensive (often around $20 for one package of mix) and many do not contain anything more special than oatmeal, flaxseed and pedraza's yeast, along with sugar and chocolate.  Probably not really worth the money.    --Keep records  --It is important to keep a daily log with the number of pumping sessions, amount obtained amount you are having to supplement your baby and 24 hour totals, this amount is more important that the pumped amount at each session. This will help you see your progress over the days.   --Keep in touch with your health care provider so he/she can monitor your progress over the days and modify advice if necessary.     Retained placenta  If you are not seeing improvement and you are having any heavy bleeding, discuss the possibility of retained placental fragments with your MD. Small bits of the placenta can secrete enough hormones to prevent the milk from coming in.    Low thyroid  Have you health care provider check your thyroid levels. Low thyroid can affect ilk supply. If you have been taking thyroid medication, have your levels checked after  delivery, you may need your medication adjusted.     Other resources: http://www.lowmilksupply.org    Meadow Video demonstrating hand expression (demo begins at about minute 2:00)  [email or web address]-milk.[email or web address]    Meadow Maximizing Milk Production Video:  [email or web address]/[email or web address]-milk-production.html      _________  Medela Supplemental Nursing system:    https://shop.[employer].co.uk/products/feeding/supplemental-nursing-system/#:~:text=The%20Medela%20Supplemental%20Nursing%20System%20is%20an%20ideal,keep%20moving%20towards%20a%20more%20satisfying%20breastfeeding%20experience.    LactAid:   https://www.lact-aid.com/-systems/

## 2021-07-27 NOTE — PROGRESS NOTES
Pt here for NST and betamethesone shot. BP's WNL. Talked with Dr. King. To be discharged and return for  A NST, BP check x2 and second shot of betamethesone.

## 2021-08-03 ENCOUNTER — ALLIED HEALTH/NURSE VISIT (OUTPATIENT)
Dept: MIDWIFE SERVICES | Facility: CLINIC | Age: 31
End: 2021-08-03
Payer: COMMERCIAL

## 2021-08-03 VITALS — SYSTOLIC BLOOD PRESSURE: 110 MMHG | OXYGEN SATURATION: 97 % | DIASTOLIC BLOOD PRESSURE: 68 MMHG | HEART RATE: 82 BPM

## 2021-08-03 DIAGNOSIS — O92.79 INSUFFICIENT LACTATION: Primary | ICD-10-CM

## 2021-08-03 PROBLEM — S83.006A CLOSED DISLOCATION OF PATELLA: Status: RESOLVED | Noted: 2021-08-03 | Resolved: 2021-08-03

## 2021-08-03 PROBLEM — L70.9 ACNE: Status: ACTIVE | Noted: 2021-08-03

## 2021-08-03 PROBLEM — Z91.030 BEE STING ALLERGY: Status: ACTIVE | Noted: 2021-08-03

## 2021-08-03 PROBLEM — E74.39 GLUCOSE INTOLERANCE: Status: ACTIVE | Noted: 2021-08-03

## 2021-08-03 PROBLEM — J30.9 ALLERGIC RHINITIS: Status: ACTIVE | Noted: 2021-08-03

## 2021-08-03 PROBLEM — E66.9 OBESITY: Status: ACTIVE | Noted: 2021-08-03

## 2021-08-03 PROBLEM — O13.9 GESTATIONAL HYPERTENSION: Status: RESOLVED | Noted: 2021-07-11 | Resolved: 2021-08-03

## 2021-08-03 PROBLEM — S83.006A CLOSED DISLOCATION OF PATELLA: Status: ACTIVE | Noted: 2021-08-03

## 2021-08-03 LAB — PROLACTIN SERPL-MCNC: 166.1 NG/ML (ref 0–20)

## 2021-08-03 PROCEDURE — 99215 OFFICE O/P EST HI 40 MIN: CPT | Performed by: ADVANCED PRACTICE MIDWIFE

## 2021-08-03 PROCEDURE — 36415 COLL VENOUS BLD VENIPUNCTURE: CPT | Performed by: ADVANCED PRACTICE MIDWIFE

## 2021-08-03 PROCEDURE — 84146 ASSAY OF PROLACTIN: CPT | Performed by: ADVANCED PRACTICE MIDWIFE

## 2021-08-03 RX ORDER — METOCLOPRAMIDE 5 MG/1
5 TABLET ORAL 3 TIMES DAILY
Qty: 36 TABLET | Refills: 0 | Status: ON HOLD | OUTPATIENT
Start: 2021-08-03 | End: 2023-10-05

## 2021-08-03 NOTE — H&P
Patient is returning call, please see message below.     Callback Number: [phone]  Best Availability: any  Can A Detailed Message Be Left? yes  Did you confirm the message with the caller?: yes    Thank you,  Blanca Castro     [facility] Labor and Delivery History and Physical    Sola Akers MRN# [id number]   Age: 31 year old YOB: 1990     Date of Admission:  2021    Primary care provider: Sierra Lyons           Chief Complaint:   Sola Akers is a 31 year old female who is 37w0d pregnant and being admitted for induction of labor, indication Gestational Hypertension. She denies any headache, vision changes or RUQ pain. She also denies any dizziness, shortness of breath or heart palpation.          Pregnancy history:     OBSTETRIC HISTORY:    OB History    Para Term  AB Living   1 0 0 0 0 0   SAB TAB Ectopic Multiple Live Births   0 0 0 0 0      # Outcome Date GA Lbr Drake/2nd Weight Sex Delivery Anes PTL Lv   1 Current                EDC: Estimated Date of Delivery: 21    Prenatal Complications: BMI 40, GHTN-on Labetalol 100mg BID, Polyhydromnios (NEYMAR at 15.9 on 21 prior NEYMAR at 30 on 21)  EFW at 92% on .  Prenatal Labs:   Lab Results   Component Value Date    HGB 7.7 (L) 2021     GBS Status:   GBS negative 2021   Active Problem List  Patient Active Problem List   Diagnosis     Gestational hypertension       Medication Prior to Admission  Medications Prior to Admission   Medication Sig Dispense Refill Last Dose     aspirin (ASA) 81 MG chewable tablet Take 81 mg by mouth At Bedtime   7/10/2021 at 2100     Calcium-Magnesium-Zinc 333-133-5 MG TABS per tablet Take 1 tablet by mouth At Bedtime   7/10/2021 at 2100     cetirizine (ZYRTEC) 10 MG tablet Take 10 mg by mouth At Bedtime   7/10/2021 at 2100     labetalol (NORMODYNE) 100 MG tablet Take 100 mg by mouth 2 times daily (with meals)   2021 at Unknown time     Prenatal Vit-Fe Fumarate-FA (PRENATAL MULTIVITAMIN W/IRON) 27-0.8 MG tablet Take 1 tablet by mouth daily   7/10/2021 at 2100   .        Maternal Past Medical History:     Past Medical History:   Diagnosis Date     Gestational hypertension       PCOS (polycystic ovarian syndrome)                        Family History:   No family history on file.             Social History:     Social History     Socioeconomic History     Marital status:      Spouse name: Not on file     Number of children: Not on file     Years of education: Not on file     Highest education level: Not on file   Occupational History     Not on file   Tobacco Use     Smoking status: Never Smoker     Smokeless tobacco: Never Used   Substance and Sexual Activity     Alcohol use: Not Currently     Drug use: Never     Sexual activity: Yes     Partners: Male   Other Topics Concern     Not on file   Social History Narrative     Not on file     Social Determinants of Health     Financial Resource Strain:      Difficulty of Paying Living Expenses:    Food Insecurity:      Worried About Running Out of Food in the Last Year:      Ran Out of Food in the Last Year:    Transportation Needs:      Lack of Transportation (Medical):      Lack of Transportation (Non-Medical):    Physical Activity:      Days of Exercise per Week:      Minutes of Exercise per Session:    Stress:      Feeling of Stress :    Social Connections:      Frequency of Communication with Friends and Family:      Frequency of Social Gatherings with Friends and Family:      Attends [employer]ism Services:      Active Member of Clubs or Organizations:      Attends Club or Organization Meetings:      Marital Status:    Intimate Partner Violence:      Fear of Current or Ex-Partner:      Emotionally Abused:      Physically Abused:      Sexually Abused:             Review of Systems:   The Review of Systems is negative other than noted in the HPI          Physical Exam:     Vitals were reviewed  Patient Vitals for the past 24 hrs:   BP Temp Temp src Pulse Resp SpO2 Height Weight   07/11/21 1025 126/75 -- -- -- -- -- -- --   07/11/21 0900 -- -- -- -- -- -- 1.524 m (5') 115.2 kg (254 lb)   07/11/21 0750 (!) 146/97 98.6  F (37  C) Oral  100 18 98 % -- --     Constitutional:   awake, alert, cooperative, no apparent distress, and appears stated age     Lungs:   No increased work of breathing, good air exchange, clear to auscultation bilaterally, no crackles or wheezing     Cardiovascular:   Normal apical impulse, regular rate and rhythm, normal S1 and S2, no S3 or S4, and no murmur noted     Skin:   normal skin color, texture, turgor      Cervix: Exam per RN   Membranes: intact   Dilation: 1   Effacement: 20%   Station:-3   Consistency: soft   Position: Mid  Presentation:Cephalic per Bedside US  Fetal Heart Rate Tracing: Category I on admit per RN  Tocometer: external monitor and irritability                       Assessment:   Sola Akers is a 37w0d pregnant female admitted with induction of labor, indication GHTN.  Normotensive ( 1 elevated BP of 146/97 prior to Labetalol)  Mason score at 4   Anemia (hgb 7.7 on admit)  Low platelet (113)          Plan:   Admit - see IP orders  Labor induction with cytotec for 12 doses  Encourage position changes  Continues EFM  Monitor mom and baby closely  Anticipate   Active management of third stage of labor  Reviewed low platelets and low Hgb and labs with Dr. Whaley.   Anesthesia updated, will plan on repeating CBC when in active labor.    Dr. Whaley aware of pt admit and remains available for consultation and collaboration.       EVELIN LemonM

## 2021-08-03 NOTE — PATIENT INSTRUCTIONS
"    1. Anuj needs about 19-20 oz of milk each day to grow well. She continues to need this amount in supplementation, so you can decide the best way for you to give this:  Bottle or SNS.  You could consider purchasing a Lact-Aid supplementer if you feel it is a good longer-term solution.    2. Continue to offer her the breast as often as she cues; giving bottles at nighttime is fine.  3.  Know that given breastfeeding challenges, although some families pursue all avenues and some wean to formula entirely, there are many other options for coping. These can include decreasing pumping efforts and using formula for supplementation, setting a time boundary on continuing to \"triple feed,\" or moving to exclusive pumping. Breastfeeding does not need to be a black-and-white choice, and you can consider what works best for your family.  You can consider just offering the breast on cue, and then supplementing with whatever extra she needs.  4.  Will check prolactin level today.  5.  Begin Reglan to try to increase milk supply, starting with 1 tab on the first day, then 2 tabs the second day, then 3 tabs daily.  After 10 days on three times daily, drop back to twice daily, and then once daily before finishing.  6.  See Dr. Kamara as planned next week, and lactation as desired.    "

## 2021-08-03 NOTE — PROGRESS NOTES
"Assessment:   1.  20 day old infant gaining weight well on breastfeeding with significant formula supplementation  2.  Good latch and suck but continues to have low milk transfer:  In need of continued full supplementation  3.  Mother with low milk supply of indeterminate cause:  possible factors include PCOS, subclinical hypothyroidism, BMI 49, insufficient glandular tissue--has had some increase in milk yield to pump since restarting Metformin and adding dietary moringa supplement.      Plan:   1. Anuj needs about 19-20 oz of milk each day to grow well. She continues to need this amount in supplementation, so you can decide the best way for you to give this:  Bottle or SNS.  You could consider purchasing a Lact-Aid supplementer if you feel it is a good longer-term solution.    2. Continue to offer her the breast as often as she cues; giving bottles at nighttime is fine.  3.  Know that given breastfeeding challenges, although some families pursue all avenues and some wean to formula entirely, there are many other options for coping. These can include decreasing pumping efforts and using formula for supplementation, setting a time boundary on continuing to \"triple feed,\" or moving to exclusive pumping. Breastfeeding does not need to be a black-and-white choice, and you can consider what works best for your family.  You can consider just offering the breast on cue, and then supplementing with whatever extra she needs.  4.  Discussed use of Reglan as galactogogue, reviewed advantages/disadvantages last visit. Would like to try this--RX sent for Reglan x 2 weeks.   5.  Discussed option of drawing prolactin today--reviewed wide range of normal, and lack of predictive value.  Sola would like to proceed--prolactin level drawn today about 20 min after nursing session.   6.  See Dr. Kamara as planned tomorrow, and lactation in one week to review effects of Reglan.      4.  Will check prolactin level today.  5.  Begin " Reglan to try to increase milk supply, starting with 1 tab on the first day, then 2 tabs the second day, then 3 tabs daily.  After 10 days on three times daily, drop back to twice daily, and then once daily before finishing.  6.  See Dr. Kamara as planned next week, and lactation as desired.    Subjective: Sola is here today for a follow-up visit. She has been followed for initial weight loss in baby Anuj, improved at last visit, and Sola's low milk supply. They have been supplementing with formula via both SNS at breast and bottles.    At last visit TSH was done and was normal;  Sola reports that she has restarted her Metformin 1000 mg bid, and has also been using the moringa.  She has noted an increase in her milk yield when pumping, from about 4 oz/day to 6-8 oz/day, and Anuj will latch well to breast, but Sola is unsure about how much milk she is transferring. Three formula bottles at night, about 2 1/2 - 3.    Hospital Course: Induced for gestational hypertension with 2 days of cervical ripening followed by about 24h Pitocin;  chorio during labor with fetal distress resulting in C/S.  Seen by hospital IBCLC due to LPI baby;  Assisted with pumping.  Day 2 added SNS for formula supplementation, which worked well.  Mother readmitted day 4 postpartum for severe pp pre-eclampsia requiring continued antihypertensives and magnesium sulfate and was hospitalized about 24 hours.     Mother's Relevant Med/Surg History: HTN on labetalol; PCOS--had been on Metformin prior to conception--stopped with pregnancy; anemia.  Subclinical hypothyroidism--no meds    Breast Surgery: none    Breastfeeding Goals: Exclusive breastfeeding     Previous Breastfeeding Experience: first baby    Infant's name: Anuj  Infant's bday: 21  Gestational age:37w3d  Infant's birth weight: 6 # 10.5 oz    Mode of delivery:   Pediatric Provider: Dr. Kimberly Kamara, [facility].  Sola gives her permission for today's  "note to be forwarded to Dr. Kamara.  DILIP signed and filed in [place]'s chart as Anuj has no local active pediatric chart.    Discharge weight: 6 # 5 oz   Recent weights on this scale   7/20/21:  6 # 3.8 oz  7/27/21:  6 # 11.6 oz      Frequency and duration of feedings:  At breast every 3-4 hours for about 10 min during the day;  bottlefeeding q3h at night  Swallows audible per mother: yes, but using SNS  Numbers of feedings in 24 hours: 8  Number urines per day: 8-10  Number of stools per day and their color: at least once/ day, seedy yellow    Supplementation: 2 1/2 - 3 oz each feeding, using SNS with breastmilk during the day and bottles of formula at night. .   Pumping:  after each feeding, yielding around 30 ml from left breast and 15 ml from right, yielding around 6-8 oz/day.     Objective/Physical exam:   Mother: Noticed breasts grew larger and areolas darkened during pregnancy and she noticed primary engorgement when her milk came in on day 4  Her nipples are everted, the areola is compressible, the breast is soft and full.     Sore nipples: yes--more tender this week, and feeling \"tingling\" between feedings  EPDS:not completed today    Assessment of infant: 17.59% Weight for age percentile   Age today: 3 weeks tomorrow  Today's weight: 7 # 7.4 oz   Amount of milk transferred from LEFT side: none measurable   Amount of milk transferred from RIGHT side: none measurable    Baby has full flexion of arms and legs, normal tone, behavior is alert and active, respirations are normal, skin is normal, hydration is normal, jaw is normal size and alignment, palate is normal, frenulum is normal, baby can lateralize tongue, has adequate tongue lift, and tongue can protrude past bottom gum line.    Suck exam:  Baby has strong, coordinated suck with good  tongue cupping    Baby thrush: none   Jaundice: none      Feeding assessment: Baby can hold suction with tongue while at the breast.     Alignment: The baby was flex " relaxed. Baby's head was aligned with its trunk. Baby did face mother. Baby was in football and cross-cradle positions today.   Areolar Grasp: Baby was able to open mouth wide. Baby's lips were not pursed. Baby's lips did flange outward. Tongue was visible over bottom gum. Baby had complete seal.     Areolar Compression: Baby made rhythmic motion. There were no clicking or smacking sounds. There was no severe nipple discomfort. Nipples appeared rounded after feeding.    Audible swallowing: Baby made quiet sounds of swallowing: There was no noted increase in frequency after milk ejection reflex. The milk ejection reflex is not noted today and milk supply is low.     /68 (BP Location: Right arm, Patient Position: Sitting, Cuff Size: Adult Large)   Pulse 82   LMP 10/25/2020   SpO2 97%   OB History    Para Term  AB Living   2 1 1 0 0 1   SAB TAB Ectopic Multiple Live Births   0 0 0 0 1      # Outcome Date GA Lbr Drake/2nd Weight Sex Delivery Anes PTL Lv   2 Current            1 Term 21 37w3d  3.02 kg (6 lb 10.5 oz) F CS-LTranv EPI N ALEXY      Complications: Fetal Intolerance, Chorioamnionitis      Name: JAVID BHATT-CLAU      Apgar1: 4  Apgar5: 8       Current Outpatient Medications:      aspirin 81 MG EC tablet, [ASPIRIN 81 MG EC TABLET] Take 81 mg by mouth daily., Disp: , Rfl:      calcium-vitamin D (CALCIUM-VITAMIN D) 500 mg(1,250mg) -200 unit per tablet, [CALCIUM-VITAMIN D (CALCIUM-VITAMIN D) 500 MG(1,250MG) -200 UNIT PER TABLET] Take 1 tablet by mouth at bedtime., Disp: , Rfl:      labetalol (NORMODYNE) 200 MG tablet, Take 1 tablet (200 mg) by mouth every 12 hours, Disp: 60 tablet, Rfl: 1     metFORMIN (GLUCOPHAGE) 1000 MG tablet, Take 1 tablet by mouth 2 times daily (with meals), Disp: , Rfl:      Prenatal Vit-Fe Fumarate-FA (PRENATAL MULTIVITAMIN W/IRON) 27-0.8 MG tablet, Take 1 tablet by mouth daily, Disp: , Rfl:   Past Medical History:   Diagnosis Date     Gestational hypertension       PCOS (polycystic ovarian syndrome)      Past Surgical History:   Procedure Laterality Date     AS KNEE SCOPE, DIAGNOSTIC        SECTION N/A 2021    Procedure:  SECTION;  Surgeon: Elvira Bustos DO;  Location: [facility] OR     No family history on file.    Time spent:  Chart review/prechartin min prior to day of service  Face-to-face visit:  49 min   Documentation:  10 min   Total time spent on day of service: 59 min    EVELIN Smith, JOSÉ ANTONIO, IBCLC

## 2021-08-10 ENCOUNTER — ALLIED HEALTH/NURSE VISIT (OUTPATIENT)
Dept: MIDWIFE SERVICES | Facility: CLINIC | Age: 31
End: 2021-08-10
Payer: COMMERCIAL

## 2021-08-10 VITALS — DIASTOLIC BLOOD PRESSURE: 67 MMHG | OXYGEN SATURATION: 98 % | SYSTOLIC BLOOD PRESSURE: 114 MMHG | HEART RATE: 75 BPM

## 2021-08-10 DIAGNOSIS — O92.79 INSUFFICIENT LACTATION: Primary | ICD-10-CM

## 2021-08-10 DIAGNOSIS — N64.82 MAMMARY HYPOPLASIA: ICD-10-CM

## 2021-08-10 PROCEDURE — 99215 OFFICE O/P EST HI 40 MIN: CPT | Performed by: ADVANCED PRACTICE MIDWIFE

## 2021-08-10 ASSESSMENT — EDINBURGH POSTNATAL DEPRESSION SCALE (EPDS)
I HAVE LOOKED FORWARD WITH ENJOYMENT TO THINGS: AS MUCH AS I EVER DID
THINGS HAVE BEEN GETTING ON TOP OF ME: NO, MOST OF THE TIME I HAVE COPED QUITE WELL
I HAVE FELT SAD OR MISERABLE: NOT VERY OFTEN
I HAVE BEEN SO UNHAPPY THAT I HAVE HAD DIFFICULTY SLEEPING: NOT AT ALL
I HAVE BEEN ABLE TO LAUGH AND SEE THE FUNNY SIDE OF THINGS: NOT QUITE SO MUCH NOW
THE THOUGHT OF HARMING MYSELF HAS OCCURRED TO ME: NEVER
I HAVE FELT SCARED OR PANICKY FOR NO GOOD REASON: NO, NOT AT ALL
TOTAL SCORE: 6
I HAVE BLAMED MYSELF UNNECESSARILY WHEN THINGS WENT WRONG: YES, SOME OF THE TIME
I HAVE BEEN ANXIOUS OR WORRIED FOR NO GOOD REASON: NO, NOT AT ALL
I HAVE BEEN SO UNHAPPY THAT I HAVE BEEN CRYING: ONLY OCCASIONALLY

## 2021-08-10 NOTE — PROGRESS NOTES
"Assessment:   1. Almost four week old infant gaining weight well on breastfeeding with significant formula supplementation  2.  Good latch and suck but continues to have minimal to no milk transfer:  In need of continued full supplementation  3.  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  Mother with desire to move to exclusive pumping      Plan:   1.  As you consider a move to exclusive pumping, know that you can continue to offer Anuj the breast whenever you like, and whenever she seems to want to nurse.  There is value in being at the breast, even if she is not able to transfer much milk.  2.  Continue to pump around 6-12 times/day to offer Anuj the milk that you are able to.  If \"power pumping\" is helpful, then you can continue to include this in your routine.  3.  Can consider use of hospital grade pump--call insurance company to check on coverage.  Given written info.  4.  Continue Reglan until course is finished;  Can also continue moringa.  5.  There are low milk supply and exclusive pumping support groups online that you could join if desired.   6.  Discussed exclusive pumping:  Number of times/day to pump, balancing pumping with bottlefeeding and some breast time if desired, nutritional and immune system value of breastmilk even in small amounts.    7.  See pediatrician as planned in a month, and lactation as needed.       Subjective: Sola and Francis here today for a follow-up visit. Sola has been followed for initial weight loss in baby Anuj, now improved, and Sola's low milk supply. This week Sola reports that she has started the Reglan, and alttough she has noted more feelings of breast fullness, especially at night,she has not noted significant improvement in output to pump.  No side effects from the Reglan. She has added a \"power pumping\" " "session each day, which is productive (yielding 1 1/2 - 2 1/2 oz).   They have stopped using the SNS and are now using bottles for supplementation, which is working well.  Sola is appropriately saddened at her low milk supply, but would like to continue to offer Anuj breastmilk in addition to formula.  She enjoys having Leslie at the breast for comfort purposes, and may continue this, but would like to discuss how to proceed with exclusive pumping to minimize \"triple feeding\" and the associated stress/exhaustion.      Previous Course: Induced for gestational hypertension;  chorio during labor with fetal distress resulting in C/S.  Seen by hospital IBCLC due to LPI baby;  Assisted with pumping.  Day 2 added SNS for formula supplementation, which worked well.  Mother readmitted day 4 postpartum x 24h for severe pp pre-eclampsia.  Baby has required ongoing supplementation with formula via both SNS at breast and bottles.  TSH and prolactin were done and were normal, Sola has restarted her Metformin, added moringa as a dietary supplement, and last week started use of Reglan to stimulate milk supply.        Mother's Relevant Med/Surg History: HTN on labetalol; PCOS on Metformin, anemia.  History of subclinical hypothyroidism--no meds    Breast Surgery: none    Breastfeeding Goals: Exclusive breastfeeding     Previous Breastfeeding Experience: first baby    Infant's name: Anuj  Infant's bday: 21  Gestational age:37w3d  Infant's birth weight: 6 # 10.5 oz    Mode of delivery:   Pediatric Provider: Dr. Kimberly Kamara, [facility].  Sola gives her permission for today's note to be forwarded to Dr. Kamara.  DILIP signed and filed in Sola's chart as Anuj has no local active pediatric chart.    Discharge weight: 6 # 5 oz   Recent weights on this scale   21:  6 # 3.8 oz  21:  6 # 11.6 oz  8/3/21:  7 # 7.4 oz      Frequency and duration of feedings:  At breast every 3-4 hours for about 10 " min during the day;  bottlefeeding q3h at night  Swallows audible per mother: unsure  Numbers of feedings in 24 hours: 8  Number urines per day: 8-10  Number of stools per day and their color: at least once/ day, seedy yellow    Supplementation: About 3 oz each feeding, using SNS with breastmilk during the day and bottles of formula at night. .   Pumpin-8 times/day, yielding around 30 ml from left breast and 15 ml from right, yielding around 6-8 oz/day.     Objective/Physical exam:   Mother: Noticed breasts grew larger and areolas darkened during pregnancy and she noticed primary engorgement when her milk came in on day 4  Her nipples are everted, the areola is compressible, the breast is soft and full.     Sore nipples: no  EPDS: 6    Assessment of infant: 22.94% Weight for age percentile   Age today: 4 weeks tomorrow  Today's weight: 8 # 1.8 oz   Amount of milk transferred from LEFT side: none measurable   Amount of milk transferred from RIGHT side: not offered    Baby has full flexion of arms and legs, normal tone, behavior is alert and active, respirations are normal, skin is normal, hydration is normal, jaw is normal size and alignment, palate is normal, frenulum is normal, baby can lateralize tongue, has adequate tongue lift, and tongue can protrude past bottom gum line.    Baby thrush: none   Jaundice: none      Feeding assessment: Baby can hold suction with tongue while at the breast.     Alignment: The baby was flex relaxed. Baby's head was aligned with its trunk. Baby did face mother. Baby was in football and cross-cradle positions today.   Areolar Grasp: Baby was able to open mouth wide. Baby's lips were not pursed. Baby's lips did flange outward. Tongue was visible over bottom gum. Baby had complete seal.     Areolar Compression: Baby made rhythmic motion. There were no clicking or smacking sounds. There was no severe nipple discomfort. Nipples appeared rounded after feeding.    Audible swallowing:  Baby made no notable sounds of swallowing: There was no noted increase in frequency after milk ejection reflex. The milk ejection reflex is not noted today and milk supply is low.     /67 (BP Location: Right arm, Patient Position: Sitting, Cuff Size: Adult Large)   Pulse 75   LMP 10/25/2020   SpO2 98%   OB History    Para Term  AB Living   2 1 1 0 0 1   SAB TAB Ectopic Multiple Live Births   0 0 0 0 1      # Outcome Date GA Lbr Drake/2nd Weight Sex Delivery Anes PTL Lv   2 Current            1 Term 21 37w3d  3.02 kg (6 lb 10.5 oz) F CS-LTranv EPI N ALEXY      Complications: Fetal Intolerance, Chorioamnionitis      Name: JAVID BHATT-CLAU      Apgar1: 4  Apgar5: 8       Current Outpatient Medications:      aspirin 81 MG EC tablet, [ASPIRIN 81 MG EC TABLET] Take 81 mg by mouth daily., Disp: , Rfl:      calcium-vitamin D (CALCIUM-VITAMIN D) 500 mg(1,250mg) -200 unit per tablet, [CALCIUM-VITAMIN D (CALCIUM-VITAMIN D) 500 MG(1,250MG) -200 UNIT PER TABLET] Take 1 tablet by mouth at bedtime., Disp: , Rfl:      labetalol (NORMODYNE) 200 MG tablet, Take 1 tablet (200 mg) by mouth every 12 hours, Disp: 60 tablet, Rfl: 1     metFORMIN (GLUCOPHAGE) 1000 MG tablet, Take 1 tablet by mouth 2 times daily (with meals), Disp: , Rfl:      metoclopramide (REGLAN) 5 MG tablet, Take 1 tablet (5 mg) by mouth 3 times daily Day 1: One tab.  Day 2: One in am and one in pm. .Days 3-12:  One tab 3 times a day.  Day 13: One in pm and one in am. Day 14: One tab., Disp: 36 tablet, Rfl: 0     Prenatal Vit-Fe Fumarate-FA (PRENATAL MULTIVITAMIN W/IRON) 27-0.8 MG tablet, Take 1 tablet by mouth daily, Disp: , Rfl:   Past Medical History:   Diagnosis Date     Closed dislocation of patella 8/3/2021    Formatting of this note might be different from the original. Created by [facility] WhatsApp Psychiatric Annotation: Aug 31 2007  6:22PM - Elvira Christie: arthroscopy      Gestational hypertension      PCOS (polycystic ovarian  syndrome)      Pre-eclampsia in postpartum period 2021     Past Surgical History:   Procedure Laterality Date     AS KNEE SCOPE, DIAGNOSTIC        SECTION N/A 2021    Procedure:  SECTION;  Surgeon: Elvira Bustos DO;  Location: [facility] OR     No family history on file.    Time spent:  Chart review/prechartin min prior to day of service  Face-to-face visit: 34 min   Documentation:   15 min   Total time spent on day of service: 49 min    EVELIN Smith, JOSÉ ANTONIO, IBCLC

## 2021-08-10 NOTE — PATIENT INSTRUCTIONS
"1.  As you consider a move to exclusive pumping, know that you can continue to offer Leslie the breast whenever you like, and whenever she seems to want to nurse.  There is value in being at the breast, even if she is not able to transfer much milk.  2.  Continue to pump around 6-12 times/day to offer Leslie the milk that you are able to.  If \"power pumping\" is helpful, then you can continue to include.    3.  Consider use of hospital grade pump.  4.  Continue Reglan until course is finished;  Can also continue moringa.  5.  There are low milk supply and exclusive pumping support groups online that you could join if desired.   6.  See pediatrician as planned in a month, and lactation as needed.     ______________________________      Regarding hospital grade pump:      1.  Call insurance company to check coverage and if any additional information is needed.  Ask if insurance will cover hospital grade pump, which is pump type     2.  Ask if you can get this pump from [employer], and if not, which medical equipment company you need to use    3.  Ask if you need a prescription or letter of medical necessity.  If so, ask the insurance company to send the form to our clinic and we can fill it out.    Cost for pump rental is around $88/month from [employer] if it is not covered by insurance, plus $60 for the tubing set (if you do not already have a Medela pump)    (Cost if rented from [facility] is about $63/month;  From [facility] is about $70/month)    __________________  "

## 2021-08-20 ENCOUNTER — TELEPHONE (OUTPATIENT)
Dept: PEDIATRICS | Facility: CLINIC | Age: 31
End: 2021-08-20
Payer: COMMERCIAL

## 2021-08-20 NOTE — TELEPHONE ENCOUNTER
"Returned call to Sola. She is exclusively pumping. In the last few days, she has some sharp shooting pain with pumping. She also has white build-up on the tip of her nipple. Baby is 5 weeks old. No thrush for infant at this time. [facility].    Assessment:  Maternal symptoms consistent with thrush.    Plan:    If the prescription All-Purpose Nipple Ointment is too expensive or difficult to obtain,   you can use a [id number] alternative with over-the-counter medications.       Purchase:   1% hydrocortisone cream   Antibiotic ointment (such as Bactroban or Bacitracin)   Miconazole cream (an antifungal:  Found with treatments for vaginal yeast, \"jock itch\" and athlete's foot)     After each feeding, mix a dab of each in your palm and apply thinly to both nipples.   You do not need to wash it off before the next feeding.     After 2-3 days, you can stop including the hydrocortisone.     Also discussed washing bottles, pacifiers, and pump supplies after each use. Boil in hot water once daily to prevent reinfection.     Discussed with Sola to follow up with her OB if no improvement in 2-3 days.    Also recommended to follow up with pediatrician to start treatment for infant to prevent reinfection.    Sola verbalizes understanding and thanked the call.    Augusto Allen, EVELIN, CPNP, IBCLC  [facility]  [facility]  8/20/2021, 5:10 PM      "

## 2021-10-16 ENCOUNTER — HEALTH MAINTENANCE LETTER (OUTPATIENT)
Age: 31
End: 2021-10-16

## 2022-05-04 ENCOUNTER — LAB REQUISITION (OUTPATIENT)
Dept: LAB | Facility: CLINIC | Age: 32
End: 2022-05-04

## 2022-05-04 DIAGNOSIS — I10 ESSENTIAL (PRIMARY) HYPERTENSION: ICD-10-CM

## 2022-05-04 LAB
ANION GAP SERPL CALCULATED.3IONS-SCNC: 12 MMOL/L (ref 5–18)
BUN SERPL-MCNC: 12 MG/DL (ref 8–22)
CALCIUM SERPL-MCNC: 9.7 MG/DL (ref 8.5–10.5)
CHLORIDE BLD-SCNC: 102 MMOL/L (ref 98–107)
CO2 SERPL-SCNC: 23 MMOL/L (ref 22–31)
CREAT SERPL-MCNC: 0.73 MG/DL (ref 0.6–1.1)
GFR SERPL CREATININE-BSD FRML MDRD: >90 ML/MIN/1.73M2
GLUCOSE BLD-MCNC: 83 MG/DL (ref 70–125)
POTASSIUM BLD-SCNC: 4.7 MMOL/L (ref 3.5–5)
SODIUM SERPL-SCNC: 137 MMOL/L (ref 136–145)
TSH SERPL DL<=0.005 MIU/L-ACNC: 2.52 UIU/ML (ref 0.3–5)

## 2022-05-04 PROCEDURE — 84443 ASSAY THYROID STIM HORMONE: CPT | Performed by: PHYSICIAN ASSISTANT

## 2022-05-04 PROCEDURE — 80048 BASIC METABOLIC PNL TOTAL CA: CPT | Performed by: PHYSICIAN ASSISTANT

## 2022-07-17 ENCOUNTER — HEALTH MAINTENANCE LETTER (OUTPATIENT)
Age: 32
End: 2022-07-17

## 2022-09-25 ENCOUNTER — HEALTH MAINTENANCE LETTER (OUTPATIENT)
Age: 32
End: 2022-09-25

## 2023-01-18 ENCOUNTER — TRANSFERRED RECORDS (OUTPATIENT)
Dept: HEALTH INFORMATION MANAGEMENT | Facility: CLINIC | Age: 33
End: 2023-01-18

## 2023-03-16 ENCOUNTER — TRANSFERRED RECORDS (OUTPATIENT)
Dept: HEALTH INFORMATION MANAGEMENT | Facility: CLINIC | Age: 33
End: 2023-03-16
Payer: COMMERCIAL

## 2023-03-16 LAB
HEPATITIS B SURFACE ANTIGEN (EXTERNAL): NEGATIVE
RUBELLA ANTIBODY IGG (EXTERNAL): NORMAL

## 2023-03-20 ENCOUNTER — TRANSCRIBE ORDERS (OUTPATIENT)
Dept: MATERNAL FETAL MEDICINE | Facility: CLINIC | Age: 33
End: 2023-03-20
Payer: COMMERCIAL

## 2023-03-20 ENCOUNTER — MEDICAL CORRESPONDENCE (OUTPATIENT)
Dept: HEALTH INFORMATION MANAGEMENT | Facility: CLINIC | Age: 33
End: 2023-03-20
Payer: COMMERCIAL

## 2023-03-20 DIAGNOSIS — O26.90 PREGNANCY RELATED CONDITION, ANTEPARTUM: Primary | ICD-10-CM

## 2023-05-22 ENCOUNTER — PRE VISIT (OUTPATIENT)
Dept: MATERNAL FETAL MEDICINE | Facility: HOSPITAL | Age: 33
End: 2023-05-22
Payer: COMMERCIAL

## 2023-05-26 ENCOUNTER — ANCILLARY PROCEDURE (OUTPATIENT)
Dept: ULTRASOUND IMAGING | Facility: HOSPITAL | Age: 33
End: 2023-05-26
Attending: ADVANCED PRACTICE MIDWIFE
Payer: COMMERCIAL

## 2023-05-26 ENCOUNTER — OFFICE VISIT (OUTPATIENT)
Dept: MATERNAL FETAL MEDICINE | Facility: HOSPITAL | Age: 33
End: 2023-05-26
Attending: ADVANCED PRACTICE MIDWIFE
Payer: COMMERCIAL

## 2023-05-26 DIAGNOSIS — O30.042 DICHORIONIC DIAMNIOTIC TWIN PREGNANCY IN SECOND TRIMESTER: Primary | ICD-10-CM

## 2023-05-26 DIAGNOSIS — O10.919 CHRONIC HYPERTENSION AFFECTING PREGNANCY: ICD-10-CM

## 2023-05-26 DIAGNOSIS — O26.90 PREGNANCY RELATED CONDITION, ANTEPARTUM: ICD-10-CM

## 2023-05-26 DIAGNOSIS — E66.01 OBESITY, CLASS III, BMI 40-49.9 (MORBID OBESITY) (H): ICD-10-CM

## 2023-05-26 PROCEDURE — 76811 OB US DETAILED SNGL FETUS: CPT | Mod: 26 | Performed by: STUDENT IN AN ORGANIZED HEALTH CARE EDUCATION/TRAINING PROGRAM

## 2023-05-26 PROCEDURE — 99203 OFFICE O/P NEW LOW 30 MIN: CPT | Mod: 25 | Performed by: STUDENT IN AN ORGANIZED HEALTH CARE EDUCATION/TRAINING PROGRAM

## 2023-05-26 PROCEDURE — 76811 OB US DETAILED SNGL FETUS: CPT

## 2023-05-26 PROCEDURE — 76812 OB US DETAILED ADDL FETUS: CPT | Mod: 26 | Performed by: STUDENT IN AN ORGANIZED HEALTH CARE EDUCATION/TRAINING PROGRAM

## 2023-05-26 NOTE — NURSING NOTE
Sola Akers is a  at 18w2d who presents to [facility] for L2 ultrasound of Di/Di twins. Pt denies bldg/lof/change in discharge, contractions, headache, vision changes, chest pain/SOB or edema. SBAR given to Dr. Sandhu, see note in Epic.

## 2023-05-26 NOTE — PROGRESS NOTES
Please see the full imaging report from the ViewPoint program under the imaging tab.    Nida Sandhu MD  Maternal Fetal Medicine

## 2023-06-26 ENCOUNTER — ANCILLARY PROCEDURE (OUTPATIENT)
Dept: ULTRASOUND IMAGING | Facility: HOSPITAL | Age: 33
End: 2023-06-26
Attending: STUDENT IN AN ORGANIZED HEALTH CARE EDUCATION/TRAINING PROGRAM
Payer: COMMERCIAL

## 2023-06-26 ENCOUNTER — OFFICE VISIT (OUTPATIENT)
Dept: MATERNAL FETAL MEDICINE | Facility: HOSPITAL | Age: 33
End: 2023-06-26
Attending: STUDENT IN AN ORGANIZED HEALTH CARE EDUCATION/TRAINING PROGRAM
Payer: COMMERCIAL

## 2023-06-26 DIAGNOSIS — O10.919 CHRONIC HYPERTENSION AFFECTING PREGNANCY: ICD-10-CM

## 2023-06-26 DIAGNOSIS — O30.042 DICHORIONIC DIAMNIOTIC TWIN PREGNANCY IN SECOND TRIMESTER: ICD-10-CM

## 2023-06-26 DIAGNOSIS — O30.042 DICHORIONIC DIAMNIOTIC TWIN PREGNANCY IN SECOND TRIMESTER: Primary | ICD-10-CM

## 2023-06-26 DIAGNOSIS — E66.01 OBESITY, CLASS III, BMI 40-49.9 (MORBID OBESITY) (H): ICD-10-CM

## 2023-06-26 PROCEDURE — 76816 OB US FOLLOW-UP PER FETUS: CPT

## 2023-06-26 PROCEDURE — 76816 OB US FOLLOW-UP PER FETUS: CPT | Mod: 26 | Performed by: STUDENT IN AN ORGANIZED HEALTH CARE EDUCATION/TRAINING PROGRAM

## 2023-06-26 PROCEDURE — 99207 PR NO CHARGE LOS: CPT | Performed by: STUDENT IN AN ORGANIZED HEALTH CARE EDUCATION/TRAINING PROGRAM

## 2023-06-26 NOTE — NURSING NOTE
Sola Akers is a  at 22w5d who presents to [facility] for RL2 for Di/Di twins. SBAR given to Dr. Sandhu, see note in Epic.     Lenora Andujar RN

## 2023-07-28 ENCOUNTER — ANCILLARY PROCEDURE (OUTPATIENT)
Dept: ULTRASOUND IMAGING | Facility: HOSPITAL | Age: 33
End: 2023-07-28
Attending: STUDENT IN AN ORGANIZED HEALTH CARE EDUCATION/TRAINING PROGRAM
Payer: COMMERCIAL

## 2023-07-28 ENCOUNTER — OFFICE VISIT (OUTPATIENT)
Dept: MATERNAL FETAL MEDICINE | Facility: HOSPITAL | Age: 33
End: 2023-07-28
Attending: STUDENT IN AN ORGANIZED HEALTH CARE EDUCATION/TRAINING PROGRAM
Payer: COMMERCIAL

## 2023-07-28 DIAGNOSIS — O30.042 DICHORIONIC DIAMNIOTIC TWIN PREGNANCY IN SECOND TRIMESTER: Primary | ICD-10-CM

## 2023-07-28 DIAGNOSIS — E66.01 OBESITY, CLASS III, BMI 40-49.9 (MORBID OBESITY) (H): ICD-10-CM

## 2023-07-28 DIAGNOSIS — O10.919 CHRONIC HYPERTENSION AFFECTING PREGNANCY: ICD-10-CM

## 2023-07-28 DIAGNOSIS — O30.042 DICHORIONIC DIAMNIOTIC TWIN PREGNANCY IN SECOND TRIMESTER: ICD-10-CM

## 2023-07-28 PROCEDURE — 76816 OB US FOLLOW-UP PER FETUS: CPT | Mod: 26 | Performed by: STUDENT IN AN ORGANIZED HEALTH CARE EDUCATION/TRAINING PROGRAM

## 2023-07-28 PROCEDURE — 76816 OB US FOLLOW-UP PER FETUS: CPT

## 2023-07-28 PROCEDURE — 99213 OFFICE O/P EST LOW 20 MIN: CPT | Mod: 25 | Performed by: STUDENT IN AN ORGANIZED HEALTH CARE EDUCATION/TRAINING PROGRAM

## 2023-07-28 NOTE — NURSING NOTE
Sola DENG Oswald is a  at 27w2d who presents to [facility] for follow up growth ultrasound of di/di twins. Pt reports positive fetal movement. Pt denies bldg/lof/change in discharge, contractions, headache, vision changes, chest pain/SOB or edema. SBAR given to Dr. Sandhu, see note in Epic.

## 2023-08-06 ENCOUNTER — HEALTH MAINTENANCE LETTER (OUTPATIENT)
Age: 33
End: 2023-08-06

## 2023-08-10 ENCOUNTER — OFFICE VISIT (OUTPATIENT)
Dept: MATERNAL FETAL MEDICINE | Facility: HOSPITAL | Age: 33
End: 2023-08-10
Attending: OBSTETRICS & GYNECOLOGY
Payer: COMMERCIAL

## 2023-08-10 ENCOUNTER — ANCILLARY PROCEDURE (OUTPATIENT)
Dept: ULTRASOUND IMAGING | Facility: HOSPITAL | Age: 33
End: 2023-08-10
Attending: OBSTETRICS & GYNECOLOGY
Payer: COMMERCIAL

## 2023-08-10 DIAGNOSIS — O40.3XX2 POLYHYDRAMNIOS, THIRD TRIMESTER, FETUS 2: ICD-10-CM

## 2023-08-10 DIAGNOSIS — O30.042 DICHORIONIC DIAMNIOTIC TWIN PREGNANCY IN SECOND TRIMESTER: ICD-10-CM

## 2023-08-10 DIAGNOSIS — O30.043 DICHORIONIC DIAMNIOTIC TWIN PREGNANCY IN THIRD TRIMESTER: Primary | ICD-10-CM

## 2023-08-10 DIAGNOSIS — O10.919 CHRONIC HYPERTENSION AFFECTING PREGNANCY: ICD-10-CM

## 2023-08-10 PROCEDURE — 76819 FETAL BIOPHYS PROFIL W/O NST: CPT | Mod: 26 | Performed by: OBSTETRICS & GYNECOLOGY

## 2023-08-10 PROCEDURE — 99207 PR NO CHARGE LOS: CPT | Performed by: OBSTETRICS & GYNECOLOGY

## 2023-08-10 PROCEDURE — 76819 FETAL BIOPHYS PROFIL W/O NST: CPT

## 2023-08-10 NOTE — PROGRESS NOTES
Please see the imaging tab for details of the ultrasound performed today.    Veda Cook MD  Specialist in Maternal-Fetal Medicine

## 2023-08-10 NOTE — NURSING NOTE
Sola Akers is a  at 29w1d who presents to [facility] for a Di/Di twin BPP. Pt reports positive fetal movement. Pt denies bldg/lof/change in discharge, contractions, headache, vision changes, chest pain/SOB or edema. SBAR given to Dr. Cook, see note in Epic.      Lenora Andujar RN

## 2023-08-17 ENCOUNTER — OFFICE VISIT (OUTPATIENT)
Dept: MATERNAL FETAL MEDICINE | Facility: HOSPITAL | Age: 33
End: 2023-08-17
Attending: OBSTETRICS & GYNECOLOGY
Payer: COMMERCIAL

## 2023-08-17 ENCOUNTER — ANCILLARY PROCEDURE (OUTPATIENT)
Dept: ULTRASOUND IMAGING | Facility: HOSPITAL | Age: 33
End: 2023-08-17
Attending: OBSTETRICS & GYNECOLOGY
Payer: COMMERCIAL

## 2023-08-17 DIAGNOSIS — O30.042 DICHORIONIC DIAMNIOTIC TWIN PREGNANCY IN SECOND TRIMESTER: ICD-10-CM

## 2023-08-17 DIAGNOSIS — O40.3XX2 POLYHYDRAMNIOS IN THIRD TRIMESTER COMPLICATION, FETUS 2 OF MULTIPLE GESTATION: Primary | ICD-10-CM

## 2023-08-17 DIAGNOSIS — O10.919 CHRONIC HYPERTENSION AFFECTING PREGNANCY: ICD-10-CM

## 2023-08-17 PROCEDURE — 76819 FETAL BIOPHYS PROFIL W/O NST: CPT | Mod: 26 | Performed by: OBSTETRICS & GYNECOLOGY

## 2023-08-17 PROCEDURE — 99207 PR NO CHARGE LOS: CPT | Performed by: OBSTETRICS & GYNECOLOGY

## 2023-08-17 PROCEDURE — 76819 FETAL BIOPHYS PROFIL W/O NST: CPT | Mod: 59

## 2023-08-17 NOTE — PROGRESS NOTES
Please see full imaging report from ViewPoint program under imaging tab.    Celestino Coates MD  Maternal Fetal Medicine

## 2023-08-17 NOTE — NURSING NOTE
Patient presents to [facility] at 30w1d. Patient reports positive fetal movements x2, denies pain, denies contractions, leaking of fluid, or bleeding. Patient denies SOB/chest pain, edema, headache, visual changes, nausea/vomiting, epigastric pain related to preeclampsia.  SBAR given to [facility] MD, see their note in Epic.      Niyah Ramirez RN on 8/17/2023 at 3:14 PM

## 2023-08-25 ENCOUNTER — OFFICE VISIT (OUTPATIENT)
Dept: MATERNAL FETAL MEDICINE | Facility: HOSPITAL | Age: 33
End: 2023-08-25
Attending: STUDENT IN AN ORGANIZED HEALTH CARE EDUCATION/TRAINING PROGRAM
Payer: COMMERCIAL

## 2023-08-25 ENCOUNTER — ANCILLARY PROCEDURE (OUTPATIENT)
Dept: ULTRASOUND IMAGING | Facility: HOSPITAL | Age: 33
End: 2023-08-25
Attending: STUDENT IN AN ORGANIZED HEALTH CARE EDUCATION/TRAINING PROGRAM
Payer: COMMERCIAL

## 2023-08-25 DIAGNOSIS — O30.042 DICHORIONIC DIAMNIOTIC TWIN PREGNANCY IN SECOND TRIMESTER: ICD-10-CM

## 2023-08-25 DIAGNOSIS — O40.3XX2 POLYHYDRAMNIOS IN THIRD TRIMESTER COMPLICATION, FETUS 2 OF MULTIPLE GESTATION: ICD-10-CM

## 2023-08-25 DIAGNOSIS — O10.919 CHRONIC HYPERTENSION AFFECTING PREGNANCY: Primary | ICD-10-CM

## 2023-08-25 DIAGNOSIS — O30.043 DICHORIONIC DIAMNIOTIC TWIN PREGNANCY IN THIRD TRIMESTER: ICD-10-CM

## 2023-08-25 DIAGNOSIS — O10.919 CHRONIC HYPERTENSION AFFECTING PREGNANCY: ICD-10-CM

## 2023-08-25 PROCEDURE — 76816 OB US FOLLOW-UP PER FETUS: CPT | Mod: 26 | Performed by: STUDENT IN AN ORGANIZED HEALTH CARE EDUCATION/TRAINING PROGRAM

## 2023-08-25 PROCEDURE — 76819 FETAL BIOPHYS PROFIL W/O NST: CPT | Mod: 26 | Performed by: STUDENT IN AN ORGANIZED HEALTH CARE EDUCATION/TRAINING PROGRAM

## 2023-08-25 PROCEDURE — 76819 FETAL BIOPHYS PROFIL W/O NST: CPT | Mod: 59

## 2023-08-25 PROCEDURE — 76819 FETAL BIOPHYS PROFIL W/O NST: CPT

## 2023-08-25 PROCEDURE — 99213 OFFICE O/P EST LOW 20 MIN: CPT | Mod: 25 | Performed by: STUDENT IN AN ORGANIZED HEALTH CARE EDUCATION/TRAINING PROGRAM

## 2023-08-25 NOTE — NURSING NOTE
Sola Akers is a  at 31w2d who presents to [facility] for a Di/Di twin follow-up ultrasound. Pt reports positive fetal movement. Pt denies bldg/lof/change in discharge, headache, vision changes, chest pain/SOB or edema. Does report feeling some contractions last night, approx 3-4 per hour, and states she felt a few this morning. Discussed signs and symptoms of  labor.  Pt encouraged to call on-call provider if experiences continued contractions or concerns.  SBAR given to Dr. Sandhu, see note in Epic.

## 2023-09-01 ENCOUNTER — PRE VISIT (OUTPATIENT)
Dept: MATERNAL FETAL MEDICINE | Facility: HOSPITAL | Age: 33
End: 2023-09-01
Payer: COMMERCIAL

## 2023-09-07 ENCOUNTER — HOSPITAL ENCOUNTER (OUTPATIENT)
Facility: CLINIC | Age: 33
Discharge: HOME | End: 2023-09-07
Attending: ADVANCED PRACTICE MIDWIFE | Admitting: ADVANCED PRACTICE MIDWIFE
Payer: COMMERCIAL

## 2023-09-07 VITALS — SYSTOLIC BLOOD PRESSURE: 135 MMHG | DIASTOLIC BLOOD PRESSURE: 79 MMHG

## 2023-09-07 PROBLEM — Z36.89 ENCOUNTER FOR TRIAGE IN PREGNANT PATIENT: Status: ACTIVE | Noted: 2023-09-07

## 2023-09-07 LAB
ALBUMIN MFR UR ELPH: 17.1 MG/DL (ref 1–14)
ALBUMIN SERPL-MCNC: 2.8 G/DL (ref 3.5–5)
ALP SERPL-CCNC: 118 U/L (ref 45–120)
ALT SERPL W P-5'-P-CCNC: 12 U/L (ref 0–45)
ANION GAP SERPL CALCULATED.3IONS-SCNC: 9 MMOL/L (ref 5–18)
AST SERPL W P-5'-P-CCNC: 15 U/L (ref 0–40)
BILIRUB SERPL-MCNC: 0.3 MG/DL (ref 0–1)
BUN SERPL-MCNC: 4 MG/DL (ref 8–22)
CALCIUM SERPL-MCNC: 9.4 MG/DL (ref 8.5–10.5)
CHLORIDE BLD-SCNC: 103 MMOL/L (ref 98–107)
CO2 SERPL-SCNC: 22 MMOL/L (ref 22–31)
CREAT SERPL-MCNC: 0.67 MG/DL (ref 0.6–1.1)
CREAT UR-MCNC: 104 MG/DL
EGFRCR SERPLBLD CKD-EPI 2021: >90 ML/MIN/1.73M2
ERYTHROCYTE [DISTWIDTH] IN BLOOD BY AUTOMATED COUNT: 13.1 % (ref 10–15)
GLUCOSE BLD-MCNC: 102 MG/DL (ref 70–125)
HCT VFR BLD AUTO: 33.8 % (ref 35–47)
HGB BLD-MCNC: 11.5 G/DL (ref 11.7–15.7)
MCH RBC QN AUTO: 31.3 PG (ref 26.5–33)
MCHC RBC AUTO-ENTMCNC: 34 G/DL (ref 31.5–36.5)
MCV RBC AUTO: 92 FL (ref 78–100)
PLATELET # BLD AUTO: 273 10E3/UL (ref 150–450)
POTASSIUM BLD-SCNC: 3.8 MMOL/L (ref 3.5–5)
PROT SERPL-MCNC: 6.2 G/DL (ref 6–8)
PROT/CREAT 24H UR: 0.16 MG/MG CR
RBC # BLD AUTO: 3.67 10E6/UL (ref 3.8–5.2)
SODIUM SERPL-SCNC: 134 MMOL/L (ref 136–145)
WBC # BLD AUTO: 10.7 10E3/UL (ref 4–11)

## 2023-09-07 PROCEDURE — 85027 COMPLETE CBC AUTOMATED: CPT | Performed by: ADVANCED PRACTICE MIDWIFE

## 2023-09-07 PROCEDURE — 80053 COMPREHEN METABOLIC PANEL: CPT | Performed by: ADVANCED PRACTICE MIDWIFE

## 2023-09-07 PROCEDURE — 84156 ASSAY OF PROTEIN URINE: CPT | Performed by: ADVANCED PRACTICE MIDWIFE

## 2023-09-07 PROCEDURE — 36415 COLL VENOUS BLD VENIPUNCTURE: CPT | Performed by: ADVANCED PRACTICE MIDWIFE

## 2023-09-07 RX ORDER — LIDOCAINE 40 MG/G
CREAM TOPICAL
Status: DISCONTINUED | OUTPATIENT
Start: 2023-09-07 | End: 2023-09-07 | Stop reason: HOSPADM

## 2023-09-07 NOTE — DISCHARGE INSTRUCTIONS
Discharge Instruction for Undelivered Patients      You were seen for:  cholo Raymond  We Consulted: Magaly Darden  You had (Test or Medicine):labs     Diet:   Drink 8 to 12 glasses of liquids (milk, juice, water) every day.  You may eat meals and snacks.     Activity:  Call your doctor or nurse midwife if your baby is moving less than usual.     Call your provider if you notice:  Swelling in your face or increased swelling in your hands or legs.  Headaches that are not relieved by Tylenol (acetaminophen).  Changes in your vision (blurring: seeing spots or stars.)  Nausea (sick to your stomach) and vomiting (throwing up).   Weight gain of 5 pounds or more per week.  Heartburn that doesn't go away.  Signs of bladder infection: pain when you urinate (use the toilet), need to go more often and more urgently.  The bag of moseley (rupture of membranes) breaks, or you notice leaking in your underwear.  Bright red blood in your underwear.  Abdominal (lower belly) or stomach pain.  For first baby: Contractions (tightening) less than 5 minutes apart for one hour or more.  Second (plus) baby: Contractions (tightening) less than 10 minutes apart and getting stronger.  *If less than 34 weeks: Contractions (tightening) more than 6 times in one hour.  Increase or change in vaginal discharge (note the color and amount)  Other: ***    Follow-up:  As scheduled in the clinic

## 2023-09-08 ENCOUNTER — ANCILLARY PROCEDURE (OUTPATIENT)
Dept: ULTRASOUND IMAGING | Facility: HOSPITAL | Age: 33
End: 2023-09-08
Attending: STUDENT IN AN ORGANIZED HEALTH CARE EDUCATION/TRAINING PROGRAM
Payer: COMMERCIAL

## 2023-09-08 ENCOUNTER — OFFICE VISIT (OUTPATIENT)
Dept: MATERNAL FETAL MEDICINE | Facility: HOSPITAL | Age: 33
End: 2023-09-08
Attending: STUDENT IN AN ORGANIZED HEALTH CARE EDUCATION/TRAINING PROGRAM
Payer: COMMERCIAL

## 2023-09-08 DIAGNOSIS — O10.919 CHRONIC HYPERTENSION AFFECTING PREGNANCY: Primary | ICD-10-CM

## 2023-09-08 DIAGNOSIS — O10.919 CHRONIC HYPERTENSION AFFECTING PREGNANCY: ICD-10-CM

## 2023-09-08 PROCEDURE — 99207 PR NO CHARGE LOS: CPT | Performed by: STUDENT IN AN ORGANIZED HEALTH CARE EDUCATION/TRAINING PROGRAM

## 2023-09-08 PROCEDURE — 76819 FETAL BIOPHYS PROFIL W/O NST: CPT | Mod: 26 | Performed by: STUDENT IN AN ORGANIZED HEALTH CARE EDUCATION/TRAINING PROGRAM

## 2023-09-08 PROCEDURE — 76819 FETAL BIOPHYS PROFIL W/O NST: CPT | Mod: 59

## 2023-09-08 NOTE — PROGRESS NOTES
Outpatient/Triage Note:    Patient Name:  Sola Akers  :      1990  MRN:      [id number]      Assessment:   @ 33w2d here for evaluation of elevated BP at home.     Plan:   -HELLP panel collected and WNL. Reviewed s/sx of preeclampsia. BP stable, no severe range readings.   - Discharge to home undelivered. Reviewed warning signs including decreased fetal movement, leaking of fluid, vaginal bleeding, or signs of labor. Reviewed how to contact on-call provider. Follow-up in clinic with OB provider tomorrow. All questions answered. Agrees with plan.     Subjective:  Sola Akers is a 33 year old  at 33 weeks with twins, who presented to [facility] for evaluation of elevated BP at home. She has been checking her BP at home for cHTN. She takes 150mg of Labetalol BID. Denies s/sx of preeclampsia. Denies leaking of fluid, bleeding, or changes in fetal movement.     Objective:  Vital signs: /79   LMP 2023     NST reactive for both fetus  No contractions    Results:  Recent Results (from the past 168 hour(s))   Protein  random urine   Result Value Ref Range Status    Total Protein Urine mg/dL 17.1 (H) 1.0 - 14.0 mg/dL Final    Total Protein Urine mg/mg Creat 0.16 mg/mg Cr Final    Creatinine Urine mg/dL 104 mg/dL Final   CBC with platelets   Result Value Ref Range Status    WBC Count 10.7 4.0 - 11.0 10e3/uL Final    RBC Count 3.67 (L) 3.80 - 5.20 10e6/uL Final    Hemoglobin 11.5 (L) 11.7 - 15.7 g/dL Final    Hematocrit 33.8 (L) 35.0 - 47.0 % Final    MCV 92 78 - 100 fL Final    MCH 31.3 26.5 - 33.0 pg Final    MCHC 34.0 31.5 - 36.5 g/dL Final    RDW 13.1 10.0 - 15.0 % Final    Platelet Count 273 150 - 450 10e3/uL Final   Comprehensive Metabolic Panel   Result Value Ref Range Status    Sodium 134 (L) 136 - 145 mmol/L Final    Potassium 3.8 3.5 - 5.0 mmol/L Final    Chloride 103 98 - 107 mmol/L Final    Carbon Dioxide (CO2) 22 22 - 31 mmol/L Final    Anion Gap 9 5 - 18 mmol/L Final     Urea Nitrogen 4 (L) 8 - 22 mg/dL Final    Creatinine 0.67 0.60 - 1.10 mg/dL Final    Calcium 9.4 8.5 - 10.5 mg/dL Final    Glucose 102 70 - 125 mg/dL Final    Alkaline Phosphatase 118 45 - 120 U/L Final    AST 15 0 - 40 U/L Final    ALT 12 0 - 45 U/L Final    Protein Total 6.2 6.0 - 8.0 g/dL Final    Albumin 2.8 (L) 3.5 - 5.0 g/dL Final    Bilirubin Total 0.3 0.0 - 1.0 mg/dL Final    GFR Estimate >90 >60 mL/min/1.73m2 Final     *Note: Due to a large number of results and/or encounters for the requested time period, some results have not been displayed. A complete set of results can be found in Results Review.         Provider: EVELIN Saeed CNM

## 2023-09-08 NOTE — NURSING NOTE
Sola Akers is a  at 33w2d who presents to [facility] for scheduled biophysical profile on Di/Di twins. Pt reports positive fetal movement. Pt denies bldg/lof/change in discharge, contractions, headache, vision changes, chest pain/SOB or edema. Patient reports increase in labetolol dose today and pre-eclampsia evaluation yesterday. SBAR given to Dr. Sandhu, see note in Epic.

## 2023-09-22 ENCOUNTER — ANCILLARY PROCEDURE (OUTPATIENT)
Dept: ULTRASOUND IMAGING | Facility: HOSPITAL | Age: 33
End: 2023-09-22
Attending: OBSTETRICS & GYNECOLOGY
Payer: COMMERCIAL

## 2023-09-22 ENCOUNTER — OFFICE VISIT (OUTPATIENT)
Dept: MATERNAL FETAL MEDICINE | Facility: HOSPITAL | Age: 33
End: 2023-09-22
Attending: OBSTETRICS & GYNECOLOGY
Payer: COMMERCIAL

## 2023-09-22 VITALS — DIASTOLIC BLOOD PRESSURE: 86 MMHG | SYSTOLIC BLOOD PRESSURE: 133 MMHG | HEART RATE: 89 BPM

## 2023-09-22 DIAGNOSIS — O10.919 CHRONIC HYPERTENSION AFFECTING PREGNANCY: Primary | ICD-10-CM

## 2023-09-22 DIAGNOSIS — O10.919 CHRONIC HYPERTENSION AFFECTING PREGNANCY: ICD-10-CM

## 2023-09-22 DIAGNOSIS — O30.043 DICHORIONIC DIAMNIOTIC TWIN PREGNANCY IN THIRD TRIMESTER: ICD-10-CM

## 2023-09-22 PROCEDURE — 76816 OB US FOLLOW-UP PER FETUS: CPT | Mod: 26 | Performed by: OBSTETRICS & GYNECOLOGY

## 2023-09-22 PROCEDURE — 76819 FETAL BIOPHYS PROFIL W/O NST: CPT

## 2023-09-22 PROCEDURE — 76816 OB US FOLLOW-UP PER FETUS: CPT | Mod: 59

## 2023-09-22 PROCEDURE — 99207 PR NO CHARGE LOS: CPT | Performed by: OBSTETRICS & GYNECOLOGY

## 2023-09-22 PROCEDURE — 76819 FETAL BIOPHYS PROFIL W/O NST: CPT | Mod: 26 | Performed by: OBSTETRICS & GYNECOLOGY

## 2023-09-29 ENCOUNTER — OFFICE VISIT (OUTPATIENT)
Dept: MATERNAL FETAL MEDICINE | Facility: HOSPITAL | Age: 33
End: 2023-09-29
Attending: OBSTETRICS & GYNECOLOGY
Payer: COMMERCIAL

## 2023-09-29 ENCOUNTER — ANCILLARY PROCEDURE (OUTPATIENT)
Dept: ULTRASOUND IMAGING | Facility: HOSPITAL | Age: 33
End: 2023-09-29
Attending: OBSTETRICS & GYNECOLOGY
Payer: COMMERCIAL

## 2023-09-29 DIAGNOSIS — O10.919 CHRONIC HYPERTENSION AFFECTING PREGNANCY: Primary | ICD-10-CM

## 2023-09-29 DIAGNOSIS — O30.043 DICHORIONIC DIAMNIOTIC TWIN PREGNANCY IN THIRD TRIMESTER: ICD-10-CM

## 2023-09-29 DIAGNOSIS — O10.919 CHRONIC HYPERTENSION AFFECTING PREGNANCY: ICD-10-CM

## 2023-09-29 PROCEDURE — 76819 FETAL BIOPHYS PROFIL W/O NST: CPT

## 2023-09-29 PROCEDURE — 76819 FETAL BIOPHYS PROFIL W/O NST: CPT | Mod: 26 | Performed by: OBSTETRICS & GYNECOLOGY

## 2023-09-29 PROCEDURE — 99207 PR NO CHARGE LOS: CPT | Performed by: OBSTETRICS & GYNECOLOGY

## 2023-09-29 NOTE — NURSING NOTE
Patient reports active fetal movement, denies pain,  contractions, leaking of fluid, or bleeding.  Patient denies headache, visual changes, nausea/vomiting, epigastric pain related to preeclampsia. Reports BP's are WNL on Labetalol. Has Repeat C/S with TL scheduled on 10/5/23.  SBAR given to LORETTA FLOYD, see their note in Epic.

## 2023-09-29 NOTE — PROGRESS NOTES
Please refer to ultrasound report under 'Imaging' Studies of 'Chart Review' tabs.    Tae Bautista M.D.

## 2023-10-04 ENCOUNTER — ANESTHESIA EVENT (OUTPATIENT)
Dept: OBGYN | Facility: HOSPITAL | Age: 33
End: 2023-10-04
Payer: COMMERCIAL

## 2023-10-05 ENCOUNTER — HOSPITAL ENCOUNTER (INPATIENT)
Facility: HOSPITAL | Age: 33
LOS: 6 days | Discharge: HOME | End: 2023-10-11
Attending: OBSTETRICS & GYNECOLOGY | Admitting: OBSTETRICS & GYNECOLOGY
Payer: COMMERCIAL

## 2023-10-05 ENCOUNTER — ANESTHESIA (OUTPATIENT)
Dept: OBGYN | Facility: HOSPITAL | Age: 33
End: 2023-10-05
Payer: COMMERCIAL

## 2023-10-05 ENCOUNTER — ANCILLARY PROCEDURE (OUTPATIENT)
Dept: ULTRASOUND IMAGING | Facility: HOSPITAL | Age: 33
End: 2023-10-05
Payer: COMMERCIAL

## 2023-10-05 DIAGNOSIS — Z98.891 S/P CESAREAN SECTION: ICD-10-CM

## 2023-10-05 LAB
ABO/RH(D): NORMAL
ANTIBODY SCREEN: NEGATIVE
CREAT SERPL-MCNC: 0.72 MG/DL (ref 0.51–0.95)
EGFRCR SERPLBLD CKD-EPI 2021: >90 ML/MIN/1.73M2
ERYTHROCYTE [DISTWIDTH] IN BLOOD BY AUTOMATED COUNT: 13.2 % (ref 10–15)
HCT VFR BLD AUTO: 33.1 % (ref 35–47)
HGB BLD-MCNC: 11.3 G/DL (ref 11.7–15.7)
MCH RBC QN AUTO: 31.2 PG (ref 26.5–33)
MCHC RBC AUTO-ENTMCNC: 34.1 G/DL (ref 31.5–36.5)
MCV RBC AUTO: 91 FL (ref 78–100)
PLATELET # BLD AUTO: 242 10E3/UL (ref 150–450)
RBC # BLD AUTO: 3.62 10E6/UL (ref 3.8–5.2)
SPECIMEN EXPIRATION DATE: NORMAL
T PALLIDUM AB SER QL: NONREACTIVE
WBC # BLD AUTO: 7.7 10E3/UL (ref 4–11)

## 2023-10-05 PROCEDURE — 250N000011 HC RX IP 250 OP 636: Mod: JZ

## 2023-10-05 PROCEDURE — 85027 COMPLETE CBC AUTOMATED: CPT | Performed by: OBSTETRICS & GYNECOLOGY

## 2023-10-05 PROCEDURE — 120N000001 HC R&B MED SURG/OB

## 2023-10-05 PROCEDURE — 88302 TISSUE EXAM BY PATHOLOGIST: CPT | Mod: 26 | Performed by: PATHOLOGY

## 2023-10-05 PROCEDURE — 258N000003 HC RX IP 258 OP 636: Performed by: OBSTETRICS & GYNECOLOGY

## 2023-10-05 PROCEDURE — 250N000011 HC RX IP 250 OP 636: Performed by: OBSTETRICS & GYNECOLOGY

## 2023-10-05 PROCEDURE — 258N000003 HC RX IP 258 OP 636: Performed by: NURSE ANESTHETIST, CERTIFIED REGISTERED

## 2023-10-05 PROCEDURE — 88307 TISSUE EXAM BY PATHOLOGIST: CPT | Mod: TC | Performed by: OBSTETRICS & GYNECOLOGY

## 2023-10-05 PROCEDURE — 272N000001 HC OR GENERAL SUPPLY STERILE: Performed by: OBSTETRICS & GYNECOLOGY

## 2023-10-05 PROCEDURE — 86850 RBC ANTIBODY SCREEN: CPT | Performed by: OBSTETRICS & GYNECOLOGY

## 2023-10-05 PROCEDURE — 250N000013 HC RX MED GY IP 250 OP 250 PS 637: Performed by: OBSTETRICS & GYNECOLOGY

## 2023-10-05 PROCEDURE — 999N000249 HC STATISTIC C-SECTION ON UNIT

## 2023-10-05 PROCEDURE — 250N000011 HC RX IP 250 OP 636: Mod: JZ | Performed by: NURSE ANESTHETIST, CERTIFIED REGISTERED

## 2023-10-05 PROCEDURE — C9290 INJ, BUPIVACAINE LIPOSOME: HCPCS | Performed by: ANESTHESIOLOGY

## 2023-10-05 PROCEDURE — 370N000017 HC ANESTHESIA TECHNICAL FEE, PER MIN: Performed by: OBSTETRICS & GYNECOLOGY

## 2023-10-05 PROCEDURE — 250N000011 HC RX IP 250 OP 636: Performed by: ANESTHESIOLOGY

## 2023-10-05 PROCEDURE — 88302 TISSUE EXAM BY PATHOLOGIST: CPT | Mod: TC | Performed by: OBSTETRICS & GYNECOLOGY

## 2023-10-05 PROCEDURE — 82565 ASSAY OF CREATININE: CPT | Performed by: OBSTETRICS & GYNECOLOGY

## 2023-10-05 PROCEDURE — 88307 TISSUE EXAM BY PATHOLOGIST: CPT | Mod: 26 | Performed by: PATHOLOGY

## 2023-10-05 PROCEDURE — 86901 BLOOD TYPING SEROLOGIC RH(D): CPT | Performed by: OBSTETRICS & GYNECOLOGY

## 2023-10-05 PROCEDURE — 0UT70ZZ RESECTION OF BILATERAL FALLOPIAN TUBES, OPEN APPROACH: ICD-10-PCS | Performed by: OBSTETRICS & GYNECOLOGY

## 2023-10-05 PROCEDURE — 36415 COLL VENOUS BLD VENIPUNCTURE: CPT | Performed by: OBSTETRICS & GYNECOLOGY

## 2023-10-05 PROCEDURE — 250N000011 HC RX IP 250 OP 636: Mod: JZ | Performed by: ANESTHESIOLOGY

## 2023-10-05 PROCEDURE — 999N000248 HC STATISTIC IV INSERT WITH US BY RN

## 2023-10-05 PROCEDURE — 250N000009 HC RX 250: Performed by: NURSE ANESTHETIST, CERTIFIED REGISTERED

## 2023-10-05 PROCEDURE — 360N000076 HC SURGERY LEVEL 3, PER MIN: Performed by: OBSTETRICS & GYNECOLOGY

## 2023-10-05 PROCEDURE — 86780 TREPONEMA PALLIDUM: CPT | Performed by: OBSTETRICS & GYNECOLOGY

## 2023-10-05 RX ORDER — SODIUM CHLORIDE, SODIUM LACTATE, POTASSIUM CHLORIDE, CALCIUM CHLORIDE 600; 310; 30; 20 MG/100ML; MG/100ML; MG/100ML; MG/100ML
10-125 INJECTION, SOLUTION INTRAVENOUS CONTINUOUS
Status: DISCONTINUED | OUTPATIENT
Start: 2023-10-05 | End: 2023-10-11 | Stop reason: HOSPADM

## 2023-10-05 RX ORDER — MISOPROSTOL 200 UG/1
400 TABLET ORAL
Status: DISCONTINUED | OUTPATIENT
Start: 2023-10-05 | End: 2023-10-05 | Stop reason: HOSPADM

## 2023-10-05 RX ORDER — NALOXONE HYDROCHLORIDE 0.4 MG/ML
0.4 INJECTION, SOLUTION INTRAMUSCULAR; INTRAVENOUS; SUBCUTANEOUS
Status: DISCONTINUED | OUTPATIENT
Start: 2023-10-05 | End: 2023-10-11 | Stop reason: HOSPADM

## 2023-10-05 RX ORDER — SODIUM CHLORIDE, SODIUM LACTATE, POTASSIUM CHLORIDE, CALCIUM CHLORIDE 600; 310; 30; 20 MG/100ML; MG/100ML; MG/100ML; MG/100ML
INJECTION, SOLUTION INTRAVENOUS CONTINUOUS
Status: DISCONTINUED | OUTPATIENT
Start: 2023-10-05 | End: 2023-10-05 | Stop reason: HOSPADM

## 2023-10-05 RX ORDER — BUPIVACAINE HYDROCHLORIDE 7.5 MG/ML
INJECTION, SOLUTION INTRASPINAL
Status: COMPLETED | OUTPATIENT
Start: 2023-10-05 | End: 2023-10-05

## 2023-10-05 RX ORDER — ONDANSETRON 2 MG/ML
4 INJECTION INTRAMUSCULAR; INTRAVENOUS EVERY 6 HOURS PRN
Status: DISCONTINUED | OUTPATIENT
Start: 2023-10-05 | End: 2023-10-11 | Stop reason: HOSPADM

## 2023-10-05 RX ORDER — SIMETHICONE 80 MG
80 TABLET,CHEWABLE ORAL 4 TIMES DAILY PRN
Status: DISCONTINUED | OUTPATIENT
Start: 2023-10-05 | End: 2023-10-11 | Stop reason: HOSPADM

## 2023-10-05 RX ORDER — MISOPROSTOL 200 UG/1
400 TABLET ORAL
Status: DISCONTINUED | OUTPATIENT
Start: 2023-10-05 | End: 2023-10-11 | Stop reason: HOSPADM

## 2023-10-05 RX ORDER — LIDOCAINE 40 MG/G
CREAM TOPICAL
Status: DISCONTINUED | OUTPATIENT
Start: 2023-10-05 | End: 2023-10-05 | Stop reason: HOSPADM

## 2023-10-05 RX ORDER — FENTANYL CITRATE 50 UG/ML
INJECTION, SOLUTION INTRAMUSCULAR; INTRAVENOUS
Status: COMPLETED | OUTPATIENT
Start: 2023-10-05 | End: 2023-10-05

## 2023-10-05 RX ORDER — NALOXONE HYDROCHLORIDE 0.4 MG/ML
0.2 INJECTION, SOLUTION INTRAMUSCULAR; INTRAVENOUS; SUBCUTANEOUS
Status: DISCONTINUED | OUTPATIENT
Start: 2023-10-05 | End: 2023-10-11 | Stop reason: HOSPADM

## 2023-10-05 RX ORDER — ONDANSETRON 4 MG/1
4 TABLET, ORALLY DISINTEGRATING ORAL EVERY 6 HOURS PRN
Status: DISCONTINUED | OUTPATIENT
Start: 2023-10-05 | End: 2023-10-11 | Stop reason: HOSPADM

## 2023-10-05 RX ORDER — METOCLOPRAMIDE HYDROCHLORIDE 5 MG/ML
10 INJECTION INTRAMUSCULAR; INTRAVENOUS EVERY 6 HOURS PRN
Status: DISCONTINUED | OUTPATIENT
Start: 2023-10-05 | End: 2023-10-11 | Stop reason: HOSPADM

## 2023-10-05 RX ORDER — BUPIVACAINE HYDROCHLORIDE 2.5 MG/ML
INJECTION, SOLUTION EPIDURAL; INFILTRATION; INTRACAUDAL
Status: COMPLETED | OUTPATIENT
Start: 2023-10-05 | End: 2023-10-05

## 2023-10-05 RX ORDER — SODIUM CHLORIDE, SODIUM LACTATE, POTASSIUM CHLORIDE, CALCIUM CHLORIDE 600; 310; 30; 20 MG/100ML; MG/100ML; MG/100ML; MG/100ML
INJECTION, SOLUTION INTRAVENOUS CONTINUOUS
Status: DISCONTINUED | OUTPATIENT
Start: 2023-10-05 | End: 2023-10-05

## 2023-10-05 RX ORDER — FENTANYL CITRATE 50 UG/ML
50 INJECTION, SOLUTION INTRAMUSCULAR; INTRAVENOUS EVERY 5 MIN PRN
Status: DISCONTINUED | OUTPATIENT
Start: 2023-10-05 | End: 2023-10-05

## 2023-10-05 RX ORDER — PROCHLORPERAZINE 25 MG
25 SUPPOSITORY, RECTAL RECTAL EVERY 12 HOURS PRN
Status: DISCONTINUED | OUTPATIENT
Start: 2023-10-05 | End: 2023-10-11 | Stop reason: HOSPADM

## 2023-10-05 RX ORDER — METOCLOPRAMIDE 10 MG/1
10 TABLET ORAL EVERY 6 HOURS PRN
Status: DISCONTINUED | OUTPATIENT
Start: 2023-10-05 | End: 2023-10-11 | Stop reason: HOSPADM

## 2023-10-05 RX ORDER — CITRIC ACID/SODIUM CITRATE 334-500MG
30 SOLUTION, ORAL ORAL
Status: COMPLETED | OUTPATIENT
Start: 2023-10-05 | End: 2023-10-05

## 2023-10-05 RX ORDER — LEVOTHYROXINE SODIUM 50 UG/1
50 TABLET ORAL DAILY
COMMUNITY

## 2023-10-05 RX ORDER — PROCHLORPERAZINE MALEATE 10 MG
10 TABLET ORAL EVERY 6 HOURS PRN
Status: DISCONTINUED | OUTPATIENT
Start: 2023-10-05 | End: 2023-10-11 | Stop reason: HOSPADM

## 2023-10-05 RX ORDER — CEFAZOLIN SODIUM/WATER 2 G/20 ML
2 SYRINGE (ML) INTRAVENOUS SEE ADMIN INSTRUCTIONS
Status: DISCONTINUED | OUTPATIENT
Start: 2023-10-05 | End: 2023-10-05 | Stop reason: HOSPADM

## 2023-10-05 RX ORDER — ONDANSETRON 2 MG/ML
4 INJECTION INTRAMUSCULAR; INTRAVENOUS EVERY 30 MIN PRN
Status: DISCONTINUED | OUTPATIENT
Start: 2023-10-05 | End: 2023-10-05

## 2023-10-05 RX ORDER — ENOXAPARIN SODIUM 100 MG/ML
40 INJECTION SUBCUTANEOUS EVERY 12 HOURS
Status: DISCONTINUED | OUTPATIENT
Start: 2023-10-05 | End: 2023-10-10

## 2023-10-05 RX ORDER — OXYTOCIN/0.9 % SODIUM CHLORIDE 30/500 ML
340 PLASTIC BAG, INJECTION (ML) INTRAVENOUS CONTINUOUS PRN
Status: DISCONTINUED | OUTPATIENT
Start: 2023-10-05 | End: 2023-10-11 | Stop reason: HOSPADM

## 2023-10-05 RX ORDER — SODIUM CHLORIDE, SODIUM LACTATE, POTASSIUM CHLORIDE, CALCIUM CHLORIDE 600; 310; 30; 20 MG/100ML; MG/100ML; MG/100ML; MG/100ML
INJECTION, SOLUTION INTRAVENOUS CONTINUOUS PRN
Status: DISCONTINUED | OUTPATIENT
Start: 2023-10-05 | End: 2023-10-05

## 2023-10-05 RX ORDER — HYDROMORPHONE HCL IN WATER/PF 6 MG/30 ML
0.4 PATIENT CONTROLLED ANALGESIA SYRINGE INTRAVENOUS EVERY 5 MIN PRN
Status: DISCONTINUED | OUTPATIENT
Start: 2023-10-05 | End: 2023-10-05

## 2023-10-05 RX ORDER — MORPHINE SULFATE 0.5 MG/ML
150 INJECTION, SOLUTION EPIDURAL; INTRATHECAL; INTRAVENOUS ONCE
Status: DISCONTINUED | OUTPATIENT
Start: 2023-10-05 | End: 2023-10-05 | Stop reason: HOSPADM

## 2023-10-05 RX ORDER — OXYTOCIN/0.9 % SODIUM CHLORIDE 30/500 ML
100-340 PLASTIC BAG, INJECTION (ML) INTRAVENOUS CONTINUOUS PRN
Status: DISCONTINUED | OUTPATIENT
Start: 2023-10-05 | End: 2023-10-11 | Stop reason: HOSPADM

## 2023-10-05 RX ORDER — OXYTOCIN 10 [USP'U]/ML
10 INJECTION, SOLUTION INTRAMUSCULAR; INTRAVENOUS
Status: DISCONTINUED | OUTPATIENT
Start: 2023-10-05 | End: 2023-10-11 | Stop reason: HOSPADM

## 2023-10-05 RX ORDER — FERROUS SULFATE 325(65) MG
325 TABLET ORAL
COMMUNITY

## 2023-10-05 RX ORDER — OXYTOCIN/0.9 % SODIUM CHLORIDE 30/500 ML
340 PLASTIC BAG, INJECTION (ML) INTRAVENOUS CONTINUOUS PRN
Status: DISCONTINUED | OUTPATIENT
Start: 2023-10-05 | End: 2023-10-05 | Stop reason: HOSPADM

## 2023-10-05 RX ORDER — LIDOCAINE 40 MG/G
CREAM TOPICAL
Status: DISCONTINUED | OUTPATIENT
Start: 2023-10-05 | End: 2023-10-11 | Stop reason: HOSPADM

## 2023-10-05 RX ORDER — MORPHINE SULFATE 1 MG/ML
INJECTION, SOLUTION EPIDURAL; INTRATHECAL; INTRAVENOUS
Status: COMPLETED | OUTPATIENT
Start: 2023-10-05 | End: 2023-10-05

## 2023-10-05 RX ORDER — MULTIVITAMIN WITH IRON
1 TABLET ORAL DAILY
COMMUNITY

## 2023-10-05 RX ORDER — MISOPROSTOL 200 UG/1
800 TABLET ORAL
Status: DISCONTINUED | OUTPATIENT
Start: 2023-10-05 | End: 2023-10-05 | Stop reason: HOSPADM

## 2023-10-05 RX ORDER — LEVOTHYROXINE SODIUM 25 UG/1
50 TABLET ORAL
Status: DISCONTINUED | OUTPATIENT
Start: 2023-10-06 | End: 2023-10-11 | Stop reason: HOSPADM

## 2023-10-05 RX ORDER — CARBOPROST TROMETHAMINE 250 UG/ML
250 INJECTION, SOLUTION INTRAMUSCULAR
Status: DISCONTINUED | OUTPATIENT
Start: 2023-10-05 | End: 2023-10-05 | Stop reason: HOSPADM

## 2023-10-05 RX ORDER — OXYTOCIN/0.9 % SODIUM CHLORIDE 30/500 ML
PLASTIC BAG, INJECTION (ML) INTRAVENOUS CONTINUOUS PRN
Status: DISCONTINUED | OUTPATIENT
Start: 2023-10-05 | End: 2023-10-05

## 2023-10-05 RX ORDER — METHYLERGONOVINE MALEATE 0.2 MG/ML
200 INJECTION INTRAVENOUS
Status: DISCONTINUED | OUTPATIENT
Start: 2023-10-05 | End: 2023-10-11 | Stop reason: HOSPADM

## 2023-10-05 RX ORDER — OXYCODONE HYDROCHLORIDE 5 MG/1
5 TABLET ORAL EVERY 4 HOURS PRN
Status: DISCONTINUED | OUTPATIENT
Start: 2023-10-05 | End: 2023-10-11 | Stop reason: HOSPADM

## 2023-10-05 RX ORDER — KETOROLAC TROMETHAMINE 30 MG/ML
30 INJECTION, SOLUTION INTRAMUSCULAR; INTRAVENOUS EVERY 6 HOURS
Status: COMPLETED | OUTPATIENT
Start: 2023-10-05 | End: 2023-10-06

## 2023-10-05 RX ORDER — AMOXICILLIN 250 MG
1 CAPSULE ORAL 2 TIMES DAILY
Status: DISCONTINUED | OUTPATIENT
Start: 2023-10-05 | End: 2023-10-11 | Stop reason: HOSPADM

## 2023-10-05 RX ORDER — HYDROMORPHONE HCL IN WATER/PF 6 MG/30 ML
0.2 PATIENT CONTROLLED ANALGESIA SYRINGE INTRAVENOUS EVERY 5 MIN PRN
Status: DISCONTINUED | OUTPATIENT
Start: 2023-10-05 | End: 2023-10-05

## 2023-10-05 RX ORDER — OXYTOCIN 10 [USP'U]/ML
10 INJECTION, SOLUTION INTRAMUSCULAR; INTRAVENOUS
Status: DISCONTINUED | OUTPATIENT
Start: 2023-10-05 | End: 2023-10-05 | Stop reason: HOSPADM

## 2023-10-05 RX ORDER — MODIFIED LANOLIN
OINTMENT (GRAM) TOPICAL
Status: DISCONTINUED | OUTPATIENT
Start: 2023-10-05 | End: 2023-10-11 | Stop reason: HOSPADM

## 2023-10-05 RX ORDER — ACETAMINOPHEN 325 MG/1
975 TABLET ORAL EVERY 6 HOURS
Status: DISCONTINUED | OUTPATIENT
Start: 2023-10-05 | End: 2023-10-09

## 2023-10-05 RX ORDER — FENTANYL CITRATE 50 UG/ML
25 INJECTION, SOLUTION INTRAMUSCULAR; INTRAVENOUS EVERY 5 MIN PRN
Status: DISCONTINUED | OUTPATIENT
Start: 2023-10-05 | End: 2023-10-05

## 2023-10-05 RX ORDER — BISACODYL 10 MG
10 SUPPOSITORY, RECTAL RECTAL DAILY PRN
Status: DISCONTINUED | OUTPATIENT
Start: 2023-10-07 | End: 2023-10-11 | Stop reason: HOSPADM

## 2023-10-05 RX ORDER — MISOPROSTOL 200 UG/1
800 TABLET ORAL
Status: DISCONTINUED | OUTPATIENT
Start: 2023-10-05 | End: 2023-10-11 | Stop reason: HOSPADM

## 2023-10-05 RX ORDER — CETIRIZINE HYDROCHLORIDE 10 MG/1
10 TABLET ORAL DAILY
COMMUNITY

## 2023-10-05 RX ORDER — DEXTROSE, SODIUM CHLORIDE, SODIUM LACTATE, POTASSIUM CHLORIDE, AND CALCIUM CHLORIDE 5; .6; .31; .03; .02 G/100ML; G/100ML; G/100ML; G/100ML; G/100ML
INJECTION, SOLUTION INTRAVENOUS CONTINUOUS
Status: DISCONTINUED | OUTPATIENT
Start: 2023-10-05 | End: 2023-10-11 | Stop reason: HOSPADM

## 2023-10-05 RX ORDER — HYDROCORTISONE 25 MG/G
CREAM TOPICAL 3 TIMES DAILY PRN
Status: DISCONTINUED | OUTPATIENT
Start: 2023-10-05 | End: 2023-10-11 | Stop reason: HOSPADM

## 2023-10-05 RX ORDER — NALBUPHINE HYDROCHLORIDE 20 MG/ML
2.5-5 INJECTION, SOLUTION INTRAMUSCULAR; INTRAVENOUS; SUBCUTANEOUS EVERY 6 HOURS PRN
Status: DISCONTINUED | OUTPATIENT
Start: 2023-10-05 | End: 2023-10-11 | Stop reason: HOSPADM

## 2023-10-05 RX ORDER — CARBOPROST TROMETHAMINE 250 UG/ML
250 INJECTION, SOLUTION INTRAMUSCULAR
Status: DISCONTINUED | OUTPATIENT
Start: 2023-10-05 | End: 2023-10-11 | Stop reason: HOSPADM

## 2023-10-05 RX ORDER — ACETAMINOPHEN 325 MG/1
975 TABLET ORAL ONCE
Status: COMPLETED | OUTPATIENT
Start: 2023-10-05 | End: 2023-10-05

## 2023-10-05 RX ORDER — AMOXICILLIN 250 MG
2 CAPSULE ORAL 2 TIMES DAILY
Status: DISCONTINUED | OUTPATIENT
Start: 2023-10-05 | End: 2023-10-11 | Stop reason: HOSPADM

## 2023-10-05 RX ORDER — FENTANYL CITRATE 50 UG/ML
25-100 INJECTION, SOLUTION INTRAMUSCULAR; INTRAVENOUS
Status: DISCONTINUED | OUTPATIENT
Start: 2023-10-05 | End: 2023-10-05 | Stop reason: HOSPADM

## 2023-10-05 RX ORDER — METHYLERGONOVINE MALEATE 0.2 MG/ML
200 INJECTION INTRAVENOUS
Status: DISCONTINUED | OUTPATIENT
Start: 2023-10-05 | End: 2023-10-05 | Stop reason: HOSPADM

## 2023-10-05 RX ORDER — ONDANSETRON 4 MG/1
4 TABLET, ORALLY DISINTEGRATING ORAL EVERY 30 MIN PRN
Status: DISCONTINUED | OUTPATIENT
Start: 2023-10-05 | End: 2023-10-05

## 2023-10-05 RX ORDER — CEFAZOLIN SODIUM/WATER 2 G/20 ML
2 SYRINGE (ML) INTRAVENOUS
Status: COMPLETED | OUTPATIENT
Start: 2023-10-05 | End: 2023-10-05

## 2023-10-05 RX ORDER — KETOROLAC TROMETHAMINE 30 MG/ML
INJECTION, SOLUTION INTRAMUSCULAR; INTRAVENOUS PRN
Status: DISCONTINUED | OUTPATIENT
Start: 2023-10-05 | End: 2023-10-05

## 2023-10-05 RX ORDER — FENTANYL CITRATE-0.9 % NACL/PF 10 MCG/ML
100 PLASTIC BAG, INJECTION (ML) INTRAVENOUS EVERY 5 MIN PRN
Status: DISCONTINUED | OUTPATIENT
Start: 2023-10-05 | End: 2023-10-05 | Stop reason: HOSPADM

## 2023-10-05 RX ORDER — IBUPROFEN 800 MG/1
800 TABLET, FILM COATED ORAL EVERY 6 HOURS
Status: DISCONTINUED | OUTPATIENT
Start: 2023-10-06 | End: 2023-10-11 | Stop reason: HOSPADM

## 2023-10-05 RX ORDER — ONDANSETRON 2 MG/ML
INJECTION INTRAMUSCULAR; INTRAVENOUS PRN
Status: DISCONTINUED | OUTPATIENT
Start: 2023-10-05 | End: 2023-10-05

## 2023-10-05 RX ADMIN — SENNOSIDES AND DOCUSATE SODIUM 1 TABLET: 50; 8.6 TABLET ORAL at 13:56

## 2023-10-05 RX ADMIN — PHENYLEPHRINE HYDROCHLORIDE 50 MCG: 10 INJECTION INTRAVENOUS at 07:39

## 2023-10-05 RX ADMIN — BUPIVACAINE HYDROCHLORIDE 20 ML: 2.5 INJECTION, SOLUTION EPIDURAL; INFILTRATION; INTRACAUDAL at 08:40

## 2023-10-05 RX ADMIN — SENNOSIDES AND DOCUSATE SODIUM 1 TABLET: 50; 8.6 TABLET ORAL at 21:35

## 2023-10-05 RX ADMIN — LABETALOL HYDROCHLORIDE 300 MG: 100 TABLET, FILM COATED ORAL at 21:35

## 2023-10-05 RX ADMIN — ACETAMINOPHEN 975 MG: 325 TABLET ORAL at 13:56

## 2023-10-05 RX ADMIN — KETOROLAC TROMETHAMINE 30 MG: 30 INJECTION INTRAMUSCULAR; INTRAVENOUS at 17:33

## 2023-10-05 RX ADMIN — OXYCODONE HYDROCHLORIDE 5 MG: 5 TABLET ORAL at 23:41

## 2023-10-05 RX ADMIN — Medication 2 G: at 07:34

## 2023-10-05 RX ADMIN — PHENYLEPHRINE HYDROCHLORIDE 0.3 MCG/KG/MIN: 10 INJECTION INTRAVENOUS at 07:30

## 2023-10-05 RX ADMIN — PHENYLEPHRINE HYDROCHLORIDE 100 MCG: 10 INJECTION INTRAVENOUS at 07:43

## 2023-10-05 RX ADMIN — SODIUM CHLORIDE, POTASSIUM CHLORIDE, SODIUM LACTATE AND CALCIUM CHLORIDE 500 ML: 600; 310; 30; 20 INJECTION, SOLUTION INTRAVENOUS at 06:49

## 2023-10-05 RX ADMIN — SODIUM CITRATE AND CITRIC ACID MONOHYDRATE 30 ML: 500; 334 SOLUTION ORAL at 06:39

## 2023-10-05 RX ADMIN — ONDANSETRON 2 MG: 2 INJECTION INTRAMUSCULAR; INTRAVENOUS at 07:33

## 2023-10-05 RX ADMIN — ACETAMINOPHEN 975 MG: 325 TABLET ORAL at 20:09

## 2023-10-05 RX ADMIN — Medication 300 ML/HR: at 07:57

## 2023-10-05 RX ADMIN — ENOXAPARIN SODIUM 40 MG: 40 INJECTION SUBCUTANEOUS at 20:09

## 2023-10-05 RX ADMIN — Medication 0.2 MG: at 07:26

## 2023-10-05 RX ADMIN — KETOROLAC TROMETHAMINE 30 MG: 30 INJECTION INTRAMUSCULAR; INTRAVENOUS at 23:33

## 2023-10-05 RX ADMIN — NALBUPHINE HYDROCHLORIDE 5 MG: 20 INJECTION, SOLUTION INTRAMUSCULAR; INTRAVENOUS; SUBCUTANEOUS at 09:36

## 2023-10-05 RX ADMIN — FENTANYL CITRATE 25 MCG: 50 INJECTION INTRAMUSCULAR; INTRAVENOUS at 07:26

## 2023-10-05 RX ADMIN — BUPIVACAINE 20 ML: 13.3 INJECTION, SUSPENSION, LIPOSOMAL INFILTRATION at 08:40

## 2023-10-05 RX ADMIN — SODIUM CHLORIDE, POTASSIUM CHLORIDE, SODIUM LACTATE AND CALCIUM CHLORIDE: 600; 310; 30; 20 INJECTION, SOLUTION INTRAVENOUS at 08:09

## 2023-10-05 RX ADMIN — BUPIVACAINE HYDROCHLORIDE IN DEXTROSE 1.4 ML: 7.5 INJECTION, SOLUTION SUBARACHNOID at 07:26

## 2023-10-05 RX ADMIN — ACETAMINOPHEN 975 MG: 325 TABLET ORAL at 06:39

## 2023-10-05 RX ADMIN — ONDANSETRON 2 MG: 2 INJECTION INTRAMUSCULAR; INTRAVENOUS at 08:05

## 2023-10-05 RX ADMIN — KETOROLAC TROMETHAMINE 30 MG: 30 INJECTION, SOLUTION INTRAMUSCULAR at 08:29

## 2023-10-05 RX ADMIN — SODIUM CHLORIDE, POTASSIUM CHLORIDE, SODIUM LACTATE AND CALCIUM CHLORIDE: 600; 310; 30; 20 INJECTION, SOLUTION INTRAVENOUS at 07:24

## 2023-10-05 ASSESSMENT — ACTIVITIES OF DAILY LIVING (ADL)
ADLS_ACUITY_SCORE: 18
ADLS_ACUITY_SCORE: 18
TOILETING_ISSUES: NO
CONCENTRATING,_REMEMBERING_OR_MAKING_DECISIONS_DIFFICULTY: NO
ADLS_ACUITY_SCORE: 18
DOING_ERRANDS_INDEPENDENTLY_DIFFICULTY: NO
WALKING_OR_CLIMBING_STAIRS_DIFFICULTY: NO
ADLS_ACUITY_SCORE: 33
FALL_HISTORY_WITHIN_LAST_SIX_MONTHS: NO
ADLS_ACUITY_SCORE: 18
CHANGE_IN_FUNCTIONAL_STATUS_SINCE_ONSET_OF_CURRENT_ILLNESS/INJURY: NO
DIFFICULTY_EATING/SWALLOWING: NO
ADLS_ACUITY_SCORE: 18
ADLS_ACUITY_SCORE: 18
WEAR_GLASSES_OR_BLIND: NO
DRESSING/BATHING_DIFFICULTY: NO
ADLS_ACUITY_SCORE: 18

## 2023-10-05 NOTE — ANESTHESIA PROCEDURE NOTES
"Intrathecal injection Procedure Note    Pre-Procedure   Staff -        Anesthesiologist:  Clemente Casillas MD       Performed By: anesthesiologist       Location: OR       Procedure Start/Stop Times: 10/5/2023 7:26 AM and 10/5/2023 7:29 AM       Pre-Anesthestic Checklist: patient identified, IV checked, risks and benefits discussed, informed consent, monitors and equipment checked, pre-op evaluation, at physician/surgeon's request and post-op pain management  Timeout:       Correct Patient: Yes        Correct Procedure: Yes        Correct Site: Yes        Correct Position: Yes   Procedure Documentation  Procedure: intrathecal injection       Diagnosis: previous        Patient Position: sitting       Patient Prep/Sterile Barriers: sterile gloves, mask, patient draped       Skin prep: Chloraprep       Insertion Site: L3-4. (midline approach).       Needle Gauge: 24.        Needle Length (Inches): 3.5        Spinal Needle Type: Pencan       Introducer used       Introducer: 20 G       # of attempts: 1 and  # of redirects:  0    Assessment/Narrative         Paresthesias: No.       Sensory Level: T6       CSF fluid: clear.    Medication(s) Administered   0.75% Hyperbaric Bupivacaine (Intrathecal) - Intrathecal   1.4 mL - 10/5/2023 7:26:00 AM  Fentanyl PF (Intrathecal) - Intrathecal   25 mcg - 10/5/2023 7:26:00 AM  Morphine PF 1 mg/mL (Intrathecal) - Intrathecal   0.2 mg - 10/5/2023 7:26:00 AM  Medication Administration Time: 10/5/2023 7:26 AM      FOR [facility] ([facility]) ONLY:   Pain Team Contact information: please page the Pain Team Via [employer]. Search \"Pain\". During daytime hours, please page the attending first. At night please page the resident first.      "

## 2023-10-05 NOTE — PLAN OF CARE
Patient pain well controlled with tylenol and toradol. Patient able to ambulate and bear weight. Jessee anthony/carlee @ [id number]. Hydration encouraged. Due to void 1945.

## 2023-10-05 NOTE — ANESTHESIA CARE TRANSFER NOTE
Patient: Sola Akers    Procedure: Procedure(s):  REPEAT  SECTION WITH BILATERAL SALPINGECTOMY       Diagnosis: Previous  delivery, delivered [O34.219]  Encounter for sterilization [Z30.2]  Dichorionic diamniotic twin pregnancy [O30.049]  Chronic hypertension [I10]  Diagnosis Additional Information: No value filed.    Anesthesia Type:   Spinal     Note:    Oropharynx: oropharynx clear of all foreign objects  Level of Consciousness: awake  Oxygen Supplementation: room air    Independent Airway: airway patency satisfactory and stable  Dentition: dentition unchanged  Vital Signs Stable: post-procedure vital signs reviewed and stable  Report to RN Given: handoff report given  Patient transferred to: PACU    Handoff Report: Identifed the Patient, Identified the Reponsible Provider, Reviewed the pertinent medical history, Discussed the surgical course, Reviewed Intra-OP anesthesia mangement and issues during anesthesia, Set expectations for post-procedure period and Allowed opportunity for questions and acknowledgement of understanding      Vitals:  Vitals Value Taken Time   /65 10/05/23 0855   Temp     Pulse     Resp     SpO2 98 % 10/05/23 0854   Vitals shown include unvalidated device data.    Electronically Signed By: EVELIN Ortiz CRNA  2023  8:58 AM

## 2023-10-05 NOTE — ANESTHESIA PROCEDURE NOTES
TAP Procedure Note    Pre-Procedure   Staff -        Anesthesiologist:  Clemente Casillas MD       Performed By: anesthesiologist       Location: OR       Procedure Start/Stop Times: 10/5/2023 8:40 AM and 10/5/2023 8:45 AM       Pre-Anesthestic Checklist: patient identified, IV checked, site marked, risks and benefits discussed, informed consent, monitors and equipment checked, pre-op evaluation, at physician/surgeon's request and post-op pain management  Timeout:       Correct Patient: Yes        Correct Procedure: Yes        Correct Site: Yes        Correct Position: Yes        Correct Laterality: Yes        Site Marked: Yes  Procedure Documentation  Procedure: TAP       Diagnosis:        Laterality: bilateral       Patient Position: supine       Patient Prep/Sterile Barriers: sterile gloves, mask       Skin prep: Chloraprep       Insertion Site: T9-10.       Needle Type: short bevel and insulated       Needle Gauge: 20.        Needle Length (Inches): 6        Ultrasound guided       1. Ultrasound was used to identify targeted nerve, plexus, vascular marker, or fascial plane and place a needle adjacent to it in real-time.       2. Ultrasound was used to visualize the spread of anesthetic in close proximity to the above referenced structure.       3. A permanent image is entered into the patient's record.       4. The visualized anatomic structures appeared normal.       5. There were no apparent abnormal pathologic findings.    Assessment/Narrative         The placement was negative for: blood aspirated, painful injection and site bleeding       Paresthesias: No.       Bolus given via needle..        Secured via.        Insertion/Infusion Method: Single Shot       Complications: none    Medication(s) Administered   Bupivacaine 0.25% PF (Infiltration) - Infiltration   20 mL - 10/5/2023 8:40:00 AM  Bupivacaine liposome (Exparel) 1.3% LA inj susp (Infiltration) - Infiltration   20 mL - 10/5/2023  "8:40:00 AM  Medication Administration Time: 10/5/2023 8:40 AM      FOR [facility]) ONLY:   Pain Team Contact information: please page the Pain Team Via [employer]. Search \"Pain\". During daytime hours, please page the attending first. At night please page the resident first.      "

## 2023-10-05 NOTE — H&P
OB ADMISSION H&P - C SECTION     Date: 10/5/2023  NAME: Sola Akers   : 1990  MRN: [id number]     CC: scheduled c section      HPI: Sola Akers is a 33 year old  with  twin inter-uterine gestation at 37w1d, with Estimated Date of Delivery: Oct 25, 2023 admitted today for repeat c section.  section secondary to cHTN and Rocio twin pregnancy. Patient feels well. Has no complaints and reports good fetal movement. Pregnancy has been complicated by cHTN, BMI 45, subclinical hypothyroidism, hx of previous . Patient denies headache, visual changes, RUQ pain. She denies contractions, leakage of fluid, or vaginal bleeding. Please see prenatal records.     OB HISTORY   OB History    Para Term  AB Living   2 1 1 0 0 1   SAB IAB Ectopic Multiple Live Births   0 0 0 0 1      # Outcome Date GA Lbr Drake/2nd Weight Sex Delivery Anes PTL Lv   2 Current            1 Term 21 37w3d  3.02 kg (6 lb 10.5 oz) F CS-LTranv EPI N ALEXY      Complications: Fetal Intolerance, Chorioamnionitis      Name: MILLERFEMALE-SOLA      Apgar1: 4  Apgar5: 8       PAST MEDICAL HISTORY  Past Medical History:   Diagnosis Date    Closed dislocation of patella 2021    Formatting of this note might be different from the original. Created by [facility] Annotation: Aug 31 2007  6:22PM - Elvira Christie: arthroscopy     Gestational hypertension     PCOS (polycystic ovarian syndrome)     Pre-eclampsia in postpartum period 2021    Thyroid disease         PAST SURGICAL HISTORY:   Past Surgical History:   Procedure Laterality Date    AS KNEE SCOPE, DIAGNOSTIC       SECTION N/A 2021    Procedure:  SECTION;  Surgeon: Elvira Bustos DO;  Location: [facility] OR        SOCIAL HISTORY   Reviewed, patient denies smoking, alcohol, and drug use  She is  . Father is  involved    MEDICATIONS  Current Facility-Administered Medications   Medication     "BUPivacaine liposome (EXPAREL) 1.3 % LA inj susp 20 mL    BUPivacaine liposome (EXPAREL) LA inj was given in the infiltration site to produce post-op analgesia. Duration of action is up to 72 hours. Other \"robert\" meds should not be given for 96 hours except for lidocaine 4% patch. This is for INFORMATION ONLY.    carboprost (HEMABATE) injection 250 mcg    ceFAZolin Sodium (ANCEF) injection 2 g    ceFAZolin Sodium (ANCEF) injection 2 g    fentaNYL (PF) (SUBLIMAZE) injection  mcg    lactated ringers BOLUS 250 mL    lactated ringers BOLUS 500 mL    lactated ringers infusion    lactated ringers infusion    lidocaine (LMX4) cream    lidocaine (LMX4) cream    lidocaine 1 % 0.1-1 mL    lidocaine 1 % 0.1-1 mL    methylergonovine (METHERGINE) injection 200 mcg    midazolam (VERSED) injection 0.5-2 mg    misoprostol (CYTOTEC) tablet 400 mcg    Or    misoprostol (CYTOTEC) tablet 800 mcg    morphine (PF) (DURAMORPH) injection 150 mcg    nalbuphine (NUBAIN) injection 2.5-5 mg    naloxone (NARCAN) injection 0.2 mg    Or    naloxone (NARCAN) injection 0.4 mg    Or    naloxone (NARCAN) injection 0.2 mg    Or    naloxone (NARCAN) injection 0.4 mg    oxytocin (PITOCIN) 30 units in 500 mL 0.9% NaCl infusion    oxytocin (PITOCIN) 30 units in 500 mL 0.9% NaCl infusion    oxytocin (PITOCIN) injection 10 Units    oxytocin (PITOCIN) injection 10 Units    phenylephrine (REBECA-SYNEPHRINE) injection 100 mcg    sodium chloride (PF) 0.9% PF flush 3 mL    sodium chloride (PF) 0.9% PF flush 3 mL    sodium chloride (PF) 0.9% PF flush 3 mL    sodium chloride (PF) 0.9% PF flush 3 mL    tranexamic acid (CYKLOKAPRON) bolus 1 g vial attach to NaCl 50 or 100 mL bag ADULT       ALLERGIES  Allergies   Allergen Reactions    Bee Venom Anaphylaxis    Seafood Anaphylaxis    Egg White [Egg White (Egg Protein)] Hives    Kiwi [Kiwi Extract] Hives    Sulfa (Sulfonamide Antibiotics) [Sulfa Antibiotics] Unknown    Venom-Honey Bee [Bee Venom] Unknown    " Penicillins Rash    Sulfa Antibiotics Rash       ROS: otherwise negative except what is stated in HPI.     PHYSICAL EXAM   /69   Temp 99.1  F (37.3  C) (Oral)   Resp 16   Ht 1.524 m (5')   Wt 120.2 kg (265 lb)   LMP 2023   BMI 51.75 kg/m     Gen: no acute distress, resting comfortably   CV: acyanotic   Heart: regular rate and rhythm   Pulm: unlabored respirations, clear to ausculation bilaterally    Abd: gravid, soft, nontender   Extremities: soft, nontender   FHR: positive, category 1  Stanton: no contractions      LABS      IMPRESSION:   33 year old [id number] 37w1d   twin inter uterine pregnancy at term   Pregnancy complications include: cHTN, BMI 45, subclinical hypothyroidism, hx of previous , PCOS.    section secondary to cHTN and di di twin pregnancy    PLAN:   - Admit to hospital  - Type and screen/hgb  - NPO   - Proceed with  section   - anesthesia notified       RISK - C SECTION  Patient counseled on risks, benefits, alternatives and expectations of  section.  Risks detailed to include, but not be limited to:  Pain, bleeding, infection, anesthesia complications, possible injury to bowel, bladder, baby and/or adjacent tissues, possible need for blood transfusion (with 1/50,000 risk of bloodborne pathogen [HIV and/or Hepatitis B/C] transmission) or even hysterectomy.  Patient voiced understanding of all R/B/A/E and has agreed to proceed with  section for delivery if needed or recommended.    Elvira Bustos, DO  [facility]  [phone]

## 2023-10-05 NOTE — OP NOTE
Section Operative Note      Pre-operative Diagnosis: 1.  Intrauterine pregnancy 37 week 1 days gestation  2.  Di di twin pregnancy  3.  cHTN on labetalol  4. Hx of previous , desires repeat  5. Unwanted fertility    Post-operative Diagnosis: same, delivered    Procedure:  repeat low transverse  section with bilateral salpingectomy    Surgeon:  Elvira Bustos DO    Assist: Arlene Hernandez    Anesthesia:  Spinal    Estimated Blood Loss:   415cc    Complications:  None; patient tolerated the procedure well.    Specimens: placenta for hospital disposal, bilateral fallopian tubes to pathology    Indications for surgery:  Patient is  with Rocio twin pregnancy at 37w1d, hx of previous  desires repeat. Patient with unwanted fertility desiring permanent sterilization. The risks and benefits were discussed and consent obtained to proceed.    Findings:  Baby A viable F weighing 6#6oz, APGARS 8,9; Baby B viable F weighing 6#12oz, APGAR 8,9, adhesive disease in subcutaneous tissue, minimal intraadominal adhesions. Normal appearing uterus and fallopian tubes bilaterally, ovaries polycystic appearing.     Procedure Details   The patient was taken to the Operating Room with IV fluids running, identified as Sola Akers and the procedure verified as  Delivery. A Time Out was held and the above information confirmed.    After administration of spinal anesthesia, the patient was positioned in the left lateral tilt position and was prepped and draped in the usual sterile fashion. A Pfannenstiel incision was made and carried down sharply through the subcutaneous tissue which was thickened with adhesions with the scalpel.  The fascia was incised in the midline and carried laterally with the scalpel, and the rectus abdominis muscles bluntly and sharply dissected away from the fascia superiorly and inferiorly to the pubic symphysis.  The rectus muscles were  in the midline, and the  peritoneum was identified.  The peritoneum was then entered.  The lower uterine segment was identified and the bladder blade was placed.     A horizontal incision was made in the lower uterine segment, and the incision was extended bilaterally in a curvilinear fashion with gentle blunt traction.  Membranes were ruptured and the amnoitic fluid was  clear.  Baby A was delivered from a breech presentation at 0751 hrs.  There was no nuchal cord.  Mouth and nares were bulb suctioned and cord doubly clamped and cut.  The infant was passed off to the Pediatric team in attendance with findings as noted above.  Baby B was noted to be oblique with head to maternal right, the head was brought to the hysterotomy and delivered atraumatically followed by the remainder of the body at 0754. There was no nuchal cord. There was a weak cry so cord was clamped and cut prior to 1 minute and baby was handed of to pediatric team.     The placenta was delivered spontaneously, intact, with a 3 vessel cord.  The uterine cavity was wiped free of remaining clot and membrane.  The uterus was exteriorized. There were no extensions of the uterine incision.  The uterus was closed with a layer of continuous running locked 0 vicryl, followed by a second imbricating layer of 0 monocryl.  A couple of figure of 8 sutures of 0 monocryl were placed along the hysterotomy for additional hemostasis. The uterine incision was inspected and all was hemostatic.  The Fallopian tubes and ovaries were examined and appeared normal.  The right fallopian tube was grasped with a francis and the ligasure device was used to sequentially coagulate and cut the mesosalpinx just below working from the fimbriated end towards the uterine cornua where the tube was amputated 1cm distal to the cornua. The same process was then carried out to amputate the left fallopian tube. The transection sites were hemostatic. The abdomen was irrigated. The uterus was replaced into the  abdominal cavity.  The pericolic gutters were swabbed clean.  The uterine incision was once again inspected once back in its normal anatomic position and was hemostatic.  The parietal peritoneum was closed with continuous running 3-0 vicryl.  The subfascial space was inspected and hemostasis achieved with electrocautery.  The fascia was closed withcontinuous running 0 vicryl. The subcutaneous tissue was irrigated and hemostasis achieved with electrocautery.  The subcutaneous tissue was reapproximated with 3-0 vicryl suture. Skin edges reapproximated with subcuticular 4.0 monocryl.  The incision was dressed with dermabond.    Estimated blood loss was 415cc.  Sponge and needle counts were correct.  There were no complications.  The patient tolerated the procedure well and was transferred to her recovery room in good condition.  The infant will be under  Nursery status.      Elvira Bustos DO

## 2023-10-05 NOTE — ANESTHESIA POSTPROCEDURE EVALUATION
Patient: Sola Akers    Procedure: Procedure(s):  REPEAT  SECTION WITH BILATERAL SALPINGECTOMY       Anesthesia Type:  Spinal    Note:  Disposition: Inpatient   Postop Pain Control: Uneventful            Sign Out: Well controlled pain   PONV: No   Neuro/Psych: Uneventful            Sign Out: Acceptable/Baseline neuro status   Airway/Respiratory: Uneventful            Sign Out: Acceptable/Baseline resp. status   CV/Hemodynamics: Uneventful            Sign Out: Acceptable CV status; No obvious hypovolemia; No obvious fluid overload   Other NRE: NONE   DID A NON-ROUTINE EVENT OCCUR? No           Last vitals:  Vitals:    10/05/23 1015 10/05/23 1030 10/05/23 1045   BP: 127/76 135/79 121/71   Resp: 16 16 16   Temp:      SpO2: 98% 97% 97%       Electronically Signed By: Clemente Casillas MD  2023  11:03 AM

## 2023-10-05 NOTE — ANESTHESIA PREPROCEDURE EVALUATION
Anesthesia Pre-Procedure Evaluation    Patient: Sola Akers   MRN: [id number] : 1990        Procedure : Procedure(s):  REPEAT  SECTION WITH BILATERAL SALPINGECTOMY          Past Medical History:   Diagnosis Date    Closed dislocation of patella 2021    Formatting of this note might be different from the original. Created by [employer] Annotation: Aug 31 2007  6:22PM - Elvira Christie: arthroscopy     Gestational hypertension     PCOS (polycystic ovarian syndrome)     Pre-eclampsia in postpartum period 2021    Thyroid disease       Past Surgical History:   Procedure Laterality Date    AS KNEE SCOPE, DIAGNOSTIC       SECTION N/A 2021    Procedure:  SECTION;  Surgeon: Elvira Bustos DO;  Location: [facility] OR      Allergies   Allergen Reactions    Bee Venom Anaphylaxis    Seafood Anaphylaxis    Egg White [Egg White (Egg Protein)] Hives    Kiwi [Kiwi Extract] Hives    Sulfa (Sulfonamide Antibiotics) [Sulfa Antibiotics] Unknown    Venom-Honey Bee [Bee Venom] Unknown    Penicillins Rash    Sulfa Antibiotics Rash      Social History     Tobacco Use    Smoking status: Never    Smokeless tobacco: Never   Substance Use Topics    Alcohol use: Not Currently     Comment: Alcoholic Drinks/day: occ prior to pregnancy      Wt Readings from Last 1 Encounters:   10/05/23 120.2 kg (265 lb)        Anesthesia Evaluation   Pt has had prior anesthetic. Type: Regional.        ROS/MED HX  ENT/Pulmonary:  - neg pulmonary ROS     Neurologic:  - neg neurologic ROS     Cardiovascular:     (+)  hypertension- -   -  - -                                      METS/Exercise Tolerance:     Hematologic:  - neg hematologic  ROS     Musculoskeletal:  - neg musculoskeletal ROS     GI/Hepatic:  - neg GI/hepatic ROS     Renal/Genitourinary:  - neg Renal ROS     Endo:     (+)          thyroid problem,     Obesity,       Psychiatric/Substance Use:  - neg psychiatric ROS      Infectious Disease:       Malignancy:       Other:      (+) Possibly pregnant, , ,         Physical Exam    Airway  airway exam normal      Mallampati: III   TM distance: > 3 FB   Neck ROM: full   Mouth opening: > 3 cm    Respiratory Devices and Support         Dental       (+) Minor Abnormalities - some fillings, tiny chips      Cardiovascular   cardiovascular exam normal       Rhythm and rate: regular and normal     Pulmonary   pulmonary exam normal        breath sounds clear to auscultation           OUTSIDE LABS:  CBC:   Lab Results   Component Value Date    WBC 7.7 10/05/2023    WBC 10.7 09/07/2023    HGB 11.3 (L) 10/05/2023    HGB 11.5 (L) 09/07/2023    HCT 33.1 (L) 10/05/2023    HCT 33.8 (L) 09/07/2023     10/05/2023     09/07/2023     BMP:   Lab Results   Component Value Date     (L) 09/07/2023     05/04/2022    POTASSIUM 3.8 09/07/2023    POTASSIUM 4.7 05/04/2022    CHLORIDE 103 09/07/2023    CHLORIDE 102 05/04/2022    CO2 22 09/07/2023    CO2 23 05/04/2022    BUN 4 (L) 09/07/2023    BUN 12 05/04/2022    CR 0.67 09/07/2023    CR 0.73 05/04/2022     09/07/2023    GLC 83 05/04/2022     COAGS:   Lab Results   Component Value Date    PTT 27 07/13/2021    INR 1.02 07/13/2021     POC: No results found for: BGM, HCG, HCGS  HEPATIC:   Lab Results   Component Value Date    ALBUMIN 2.8 (L) 09/07/2023    PROTTOTAL 6.2 09/07/2023    ALT 12 09/07/2023    AST 15 09/07/2023    ALKPHOS 118 09/07/2023    BILITOTAL 0.3 09/07/2023     OTHER:   Lab Results   Component Value Date    LACT 2.1 (H) 07/14/2021    ARLEEN 9.4 09/07/2023    TSH 2.52 05/04/2022       Anesthesia Plan    ASA Status:  3    NPO Status:  NPO Appropriate    Anesthesia Type: Spinal.              Consents    Anesthesia Plan(s) and associated risks, benefits, and realistic alternatives discussed. Questions answered and patient/representative(s) expressed understanding.     - Discussed: Risks, Benefits and Alternatives for BOTH  SEDATION and the PROCEDURE were discussed     - Discussed with:  Patient, Spouse            Postoperative Care    Pain management: IV analgesics, intrathecal morphine, Peripheral nerve block (Single Shot), Multi-modal analgesia.   PONV prophylaxis: Ondansetron (or other 5HT-3)     Comments:    Other Comments: TAP pretty Casillas MD

## 2023-10-05 NOTE — PLAN OF CARE
Problem: Postpartum ( Delivery)  Goal: Anesthesia/Sedation Recovery  Outcome: Progressing     Problem: Postpartum ( Delivery)  Goal: Hemostasis  Outcome: Progressing   Goal Outcome Evaluation:               Minimal vaginal discharge post op. No clots noted. Pt states that pain meds are adequate currently for pain control.  Karen J Schoenberg, RN

## 2023-10-05 NOTE — PROGRESS NOTES
Pt received in room 8 for [facility].   Karen J Schoenberg, RN     Wound Assessment Pink/red   Drainage Amount Scant   Drainage Description Serosanguinous   Odor None   Sandrine-wound Assessment Intact   Wound Thickness Description not for Pressure Injury Partial thickness       Impression:  Left buttock: partial thickness (photo did not save to chart)    Plan: 4x4 mepilex  Antifungal powder  Bariatric MURRAY  TAP system  Patient will need continued preventative care.       Caffie Apo 4/21/2021 3:43 PM

## 2023-10-06 LAB — HGB BLD-MCNC: 9.7 G/DL (ref 11.7–15.7)

## 2023-10-06 PROCEDURE — 250N000011 HC RX IP 250 OP 636: Performed by: OBSTETRICS & GYNECOLOGY

## 2023-10-06 PROCEDURE — 250N000013 HC RX MED GY IP 250 OP 250 PS 637: Performed by: OBSTETRICS & GYNECOLOGY

## 2023-10-06 PROCEDURE — 250N000013 HC RX MED GY IP 250 OP 250 PS 637: Performed by: ADVANCED PRACTICE MIDWIFE

## 2023-10-06 PROCEDURE — 120N000001 HC R&B MED SURG/OB

## 2023-10-06 PROCEDURE — 85018 HEMOGLOBIN: CPT | Performed by: OBSTETRICS & GYNECOLOGY

## 2023-10-06 PROCEDURE — 36415 COLL VENOUS BLD VENIPUNCTURE: CPT | Performed by: OBSTETRICS & GYNECOLOGY

## 2023-10-06 RX ORDER — LEVOTHYROXINE SODIUM 25 UG/1
50 TABLET ORAL DAILY
Status: DISCONTINUED | OUTPATIENT
Start: 2023-10-06 | End: 2023-10-06

## 2023-10-06 RX ORDER — LABETALOL 200 MG/1
400 TABLET, FILM COATED ORAL 2 TIMES DAILY
Status: DISCONTINUED | OUTPATIENT
Start: 2023-10-06 | End: 2023-10-08

## 2023-10-06 RX ORDER — CETIRIZINE HYDROCHLORIDE 10 MG/1
10 TABLET ORAL DAILY
Status: DISCONTINUED | OUTPATIENT
Start: 2023-10-06 | End: 2023-10-11 | Stop reason: HOSPADM

## 2023-10-06 RX ORDER — NIFEDIPINE 30 MG
30 TABLET, EXTENDED RELEASE ORAL DAILY
Status: DISCONTINUED | OUTPATIENT
Start: 2023-10-06 | End: 2023-10-08

## 2023-10-06 RX ADMIN — OXYCODONE HYDROCHLORIDE 5 MG: 5 TABLET ORAL at 13:04

## 2023-10-06 RX ADMIN — LEVOTHYROXINE SODIUM 50 MCG: 0.03 TABLET ORAL at 08:21

## 2023-10-06 RX ADMIN — ACETAMINOPHEN 975 MG: 325 TABLET ORAL at 08:25

## 2023-10-06 RX ADMIN — LABETALOL HYDROCHLORIDE 400 MG: 200 TABLET, FILM COATED ORAL at 18:30

## 2023-10-06 RX ADMIN — CETIRIZINE HYDROCHLORIDE 10 MG: 10 TABLET, FILM COATED ORAL at 20:43

## 2023-10-06 RX ADMIN — SIMETHICONE 80 MG: 80 TABLET, CHEWABLE ORAL at 11:49

## 2023-10-06 RX ADMIN — KETOROLAC TROMETHAMINE 30 MG: 30 INJECTION INTRAMUSCULAR; INTRAVENOUS at 05:51

## 2023-10-06 RX ADMIN — LABETALOL HYDROCHLORIDE 300 MG: 100 TABLET, FILM COATED ORAL at 08:23

## 2023-10-06 RX ADMIN — ONDANSETRON 4 MG: 4 TABLET, ORALLY DISINTEGRATING ORAL at 19:49

## 2023-10-06 RX ADMIN — ENOXAPARIN SODIUM 40 MG: 40 INJECTION SUBCUTANEOUS at 08:31

## 2023-10-06 RX ADMIN — OXYCODONE HYDROCHLORIDE 5 MG: 5 TABLET ORAL at 03:44

## 2023-10-06 RX ADMIN — IBUPROFEN 800 MG: 800 TABLET ORAL at 17:33

## 2023-10-06 RX ADMIN — ACETAMINOPHEN 975 MG: 325 TABLET ORAL at 14:23

## 2023-10-06 RX ADMIN — SENNOSIDES AND DOCUSATE SODIUM 2 TABLET: 50; 8.6 TABLET ORAL at 20:36

## 2023-10-06 RX ADMIN — IBUPROFEN 800 MG: 800 TABLET ORAL at 11:40

## 2023-10-06 RX ADMIN — ACETAMINOPHEN 975 MG: 325 TABLET ORAL at 20:37

## 2023-10-06 RX ADMIN — SENNOSIDES AND DOCUSATE SODIUM 2 TABLET: 50; 8.6 TABLET ORAL at 08:20

## 2023-10-06 RX ADMIN — OXYCODONE HYDROCHLORIDE 5 MG: 5 TABLET ORAL at 19:31

## 2023-10-06 RX ADMIN — OXYCODONE HYDROCHLORIDE 5 MG: 5 TABLET ORAL at 23:19

## 2023-10-06 RX ADMIN — IBUPROFEN 800 MG: 800 TABLET ORAL at 23:19

## 2023-10-06 RX ADMIN — NIFEDIPINE 30 MG: 30 TABLET, EXTENDED RELEASE ORAL at 22:33

## 2023-10-06 RX ADMIN — ACETAMINOPHEN 975 MG: 325 TABLET ORAL at 02:26

## 2023-10-06 RX ADMIN — ENOXAPARIN SODIUM 40 MG: 40 INJECTION SUBCUTANEOUS at 20:36

## 2023-10-06 RX ADMIN — OXYCODONE HYDROCHLORIDE 5 MG: 5 TABLET ORAL at 09:04

## 2023-10-06 ASSESSMENT — ACTIVITIES OF DAILY LIVING (ADL)
ADLS_ACUITY_SCORE: 18

## 2023-10-06 NOTE — PLAN OF CARE
Patient is managing pain with Ibuprofen, Tylenol, and oxycodone. Postpartum assessment WNL. Still needs to complete mood assessment and birth certificate.

## 2023-10-06 NOTE — PROGRESS NOTES
Nutrition Therapy  Brief Note    Patient having issues ordering food due to allergy listed for egg white. (This prevents ordering any food that would contain egg).  Spoke with patient by phone today.  Patient reports that this allergy was to raw egg whites when she was a child and she no longer has this allergy.  Patient is able to eat eggs and she has no issues/no allergy to the flu shot.    RD removed the egg white allergy from EPIC as pt reports no allergy to eggs at this time.  Patient should be able to order foods that contain eggs now.

## 2023-10-06 NOTE — PROGRESS NOTES
Postpartum Day 1    Patient Name:  Sola Akers  :  1990  MRN:  [id number]      Assessment:  Normal postpartum course POD#1. Acute postoperative blood loss anemia, asymptomatic, Hgb 9.7, .     Plan:  Continue current care. Home meds ordered. Watch BP's, continue current labetalol dose for now.     Subjective:  The patient feels well:  Voiding without difficulty, lochia normal, tolerating normal diet, ambulating without difficulty and passing flatus.  Voiding independently without complication. Pain is well controlled with current medications.  The patient has no emotional concerns.  The baby girls are doing well and rooming in. Big sister coming to visit soon!     cHTN. Labetalol 300mg BID.     Birth date:      Ermias Akers-NICKIE Selby [[id number]]   10/5/20[birth year]      Bianca Akers [[id number]]   [birth year]/5/2023   Birth Time:      Emerita Akers [[id number]]   7:51 AM      Bianca Akers [[id number]]   7:54 AM     Prenatal complications: cHTN, BMI 45, subclinical hypothyroidism, hx , di-di twin pregnancy.    Objective:  /86 (BP Location: Left arm, Patient Position: Sitting, Cuff Size: Adult Large)   Pulse 95   Temp 98.1  F (36.7  C) (Oral)   Resp 18   Ht 1.524 m (5')   Wt 116.8 kg (257 lb 9.6 oz)   LMP 2023   SpO2 97%   Breastfeeding Unknown   BMI 50.31 kg/m    Patient Vitals for the past 24 hrs:   BP Temp Temp src Pulse Resp SpO2 Weight   10/06/23 0806 138/86 98.1  F (36.7  C) Oral 95 18 97 % --   10/06/23 0557 -- -- -- 99 -- 98 % --   10/06/23 0400 -- -- -- 96 -- 98 % --   10/06/23 0342 129/65 97.8  F (36.6  C) Oral 92 18 97 % 116.8 kg (257 lb 9.6 oz)   10/06/23 0234 -- -- -- 100 -- 97 % --   10/06/23 0135 -- -- -- 89 -- 95 % --   10/06/23 0007 -- -- -- 93 -- 96 % --   10/05/23 2343 135/86 98.8  F (37.1  C) Oral 90 18 97 % --   10/05/23 1940 138/88 97.8  F (36.6  C) Oral 91 18 96 % --   10/05/23 1551 137/76 98.2  F (36.8  C) Oral --  18 -- --   10/05/23 1550 -- -- -- -- -- 96 % --   10/05/23 1439 126/78 -- -- -- -- 99 % --   10/05/23 1319 128/84 -- -- -- -- -- --       Exam: Patient A&O x 3. No acute distress, breathing unlabored. The amount and color of the lochia is appropriate for the duration of recovery. Uterine fundus is firm at U. Urinary output is adequate. The low transverse sutured incision is clean, dry and intact.    Lab Results   Component Value Date    AS Negative 10/05/2023    HGB 9.7 (L) 10/06/2023       Immunization History   Administered Date(s) Administered    COVID-19 MONOVALENT 12+ (Pfizer) 08/20/2021, 09/10/2021    COVID-19 Monovalent 12+ (Pfizer 2022) 02/18/2022    DT (PEDS <7y) 08/09/2002    HPV Quadrivalent 08/31/2007, 02/11/2008, 07/05/2012    HepA, Unspecified 08/31/2007, 07/05/2012    HepB, Unspecified 08/19/2002, 10/01/2002, 05/22/2003    Hib, Unspecified 1990, 03/22/1991, 06/19/1991, 06/15/1992    Historical DTP/aP 1990, [birth year], 06/15/1992, 06/30/1995    MMR 09/06/1991    Meningococcal ACWY (Menactra ) 08/31/2007    OPV, trivalent, live 1990, 1990, 06/15/1992, 06/30/1995    TDAP (Adacel,Boostrix) 08/31/2007    TDAP Vaccine (Adacel) 05/21/2021       Provider: EVELIN Dee CNM    Date:  10/6/2023  Time:  12:41 PM

## 2023-10-06 NOTE — PROGRESS NOTES
Dr. King was called to report patient's recent BP of 152/87 and 156/100 taken at 1738 and 1811 respectively.  He changed her Labetalol order from 300 mg twice daily to 400 mg twice daily.  Administer dose scheduled for 2100 now.

## 2023-10-06 NOTE — PLAN OF CARE
Patient's vitals and maternal assessment WDL. Incision clean, dry and intact and open to air. Pain adequately managed with Tylenol, Toradol and PRN Oxycodone. Up independently caring for self and infants. Patient is attentive to infant needs, asking appropriate questions and holds infant frequently.     Problem: Plan of Care - These are the overarching goals to be used throughout the patient stay.    Goal: Optimal Comfort and Wellbeing  Outcome: Progressing  Intervention: Monitor Pain and Promote Comfort  Recent Flowsheet Documentation  Taken 10/5/2023 2343 by Sarah Cruz, RN  Pain Management Interventions: medication (see MAR)  Intervention: Provide Person-Centered Care  Recent Flowsheet Documentation  Taken 10/5/2023 2343 by Sarah Cruz, RN  Trust Relationship/Rapport:   care explained   choices provided   emotional support provided   empathic listening provided   questions answered   questions encouraged   reassurance provided   thoughts/feelings acknowledged     Problem: Postpartum ( Delivery)  Goal: Successful Maternal Role Transition  Outcome: Progressing     Problem: Postpartum ( Delivery)  Goal: Optimal Pain Control and Function  Outcome: Progressing  Intervention: Prevent or Manage Pain  Recent Flowsheet Documentation  Taken 10/5/2023 2343 by Sarah Cruz, RN  Pain Management Interventions: medication (see MAR)  Perineal Care:   absorbent brief/pad changed   perineal spray bottle/warm water use encouraged   perineal hygiene encouraged   Goal Outcome Evaluation:      Plan of Care Reviewed With: patient    Overall Patient Progress: improvingOverall Patient Progress: improving

## 2023-10-06 NOTE — LACTATION NOTE
"This writer met with Sola per her request.  She reports a low milk supply with her first child, who was born at 37 weeks gestation.  She states she had 2 inpatient lactation visits with her first child and then she saw an IBCLC in the outpatient lactation clinic two times a week for a couple weeks.  Her first child could not transfer well at the breast.  Sola went to pumping only and was only able to produce 8 oz total in 24 hours.      This writer provided active listening to Sola's story.  She admits to having post partum depression after her breastfeeding journey with her first child and does not want breastfeeding to, \"Stress  her out\".  This writer encouraged Sola to make a short term goal and a long term goal.  The short term goal may be to pump 8-12 times a day for the next 24 hours.  The long term goal may be set for Sunday.  When Sunday comes, she may ask herself, \"How is my breastfeeding journey going?  Do I want to continue this journey?  Is there anything I need to change?\"  At that time, she can make another goal for the next week and check in with herself to see if pumping for twins is too much, or if she wants to continue pumping.      Encouragement given.  Informed her that her twins do not have to have all breast milk.  They can have as much breast milk as she can provide, then supplement with formula.  Even if each twin was fed one ounce of breast milk at day, each twin is gaining 100% benefit of her breast milk.      Sola thanked for the talk and will pump and bottle twins for now.  If she decides to put infants to breast, she will put one twin to breast per feeding, supplement both infants, as they cue, and pump.  We briefly discussed renting a hospital grade breast pump.  She was instructed to contact insurance to check coverage.  She was encouraged to contact the outpatient lactation clinic for follow up, prn.  Lactation to follow up, prgentry.  Sola verbalizes understanding of all " education given.  She denies any further questions.

## 2023-10-06 NOTE — PLAN OF CARE
Pt is bonding with her baby girls. She is ambulating independently. Vital signs WDL. Bleeding is scant, firm at umbilicus. Voiding without difficulty. Pt encouraged to empty bladder every 2-3 hours. Pain managed with tylenol, toradol and ice packs. Incision is dry, clean and intact, open to air.

## 2023-10-07 LAB
ALBUMIN SERPL BCG-MCNC: 3.1 G/DL (ref 3.5–5.2)
ALP SERPL-CCNC: 130 U/L (ref 35–104)
ALT SERPL W P-5'-P-CCNC: 21 U/L (ref 0–50)
ANION GAP SERPL CALCULATED.3IONS-SCNC: 9 MMOL/L (ref 7–15)
AST SERPL W P-5'-P-CCNC: 28 U/L (ref 0–45)
BILIRUB SERPL-MCNC: 0.2 MG/DL
BUN SERPL-MCNC: 6.5 MG/DL (ref 6–20)
CALCIUM SERPL-MCNC: 8.5 MG/DL (ref 8.6–10)
CHLORIDE SERPL-SCNC: 106 MMOL/L (ref 98–107)
CREAT SERPL-MCNC: 0.74 MG/DL (ref 0.51–0.95)
DEPRECATED HCO3 PLAS-SCNC: 22 MMOL/L (ref 22–29)
EGFRCR SERPLBLD CKD-EPI 2021: >90 ML/MIN/1.73M2
ERYTHROCYTE [DISTWIDTH] IN BLOOD BY AUTOMATED COUNT: 13.3 % (ref 10–15)
GLUCOSE SERPL-MCNC: 94 MG/DL (ref 70–99)
HCT VFR BLD AUTO: 30.5 % (ref 35–47)
HGB BLD-MCNC: 10 G/DL (ref 11.7–15.7)
HGB BLD-MCNC: 10.5 G/DL (ref 11.7–15.7)
MCH RBC QN AUTO: 31.5 PG (ref 26.5–33)
MCHC RBC AUTO-ENTMCNC: 34.4 G/DL (ref 31.5–36.5)
MCV RBC AUTO: 92 FL (ref 78–100)
PLATELET # BLD AUTO: 223 10E3/UL (ref 150–450)
POTASSIUM SERPL-SCNC: 4.2 MMOL/L (ref 3.4–5.3)
PROT SERPL-MCNC: 5.4 G/DL (ref 6.4–8.3)
RBC # BLD AUTO: 3.33 10E6/UL (ref 3.8–5.2)
SODIUM SERPL-SCNC: 137 MMOL/L (ref 135–145)
WBC # BLD AUTO: 4.8 10E3/UL (ref 4–11)

## 2023-10-07 PROCEDURE — 36415 COLL VENOUS BLD VENIPUNCTURE: CPT | Performed by: ADVANCED PRACTICE MIDWIFE

## 2023-10-07 PROCEDURE — 85027 COMPLETE CBC AUTOMATED: CPT | Performed by: ADVANCED PRACTICE MIDWIFE

## 2023-10-07 PROCEDURE — 85018 HEMOGLOBIN: CPT | Performed by: OBSTETRICS & GYNECOLOGY

## 2023-10-07 PROCEDURE — 250N000011 HC RX IP 250 OP 636: Mod: JZ | Performed by: OBSTETRICS & GYNECOLOGY

## 2023-10-07 PROCEDURE — 120N000001 HC R&B MED SURG/OB

## 2023-10-07 PROCEDURE — 80053 COMPREHEN METABOLIC PANEL: CPT | Performed by: OBSTETRICS & GYNECOLOGY

## 2023-10-07 PROCEDURE — 250N000013 HC RX MED GY IP 250 OP 250 PS 637: Performed by: OBSTETRICS & GYNECOLOGY

## 2023-10-07 PROCEDURE — 250N000013 HC RX MED GY IP 250 OP 250 PS 637: Performed by: ADVANCED PRACTICE MIDWIFE

## 2023-10-07 PROCEDURE — 258N000003 HC RX IP 258 OP 636: Performed by: OBSTETRICS & GYNECOLOGY

## 2023-10-07 PROCEDURE — 999N000285 HC STATISTIC VASC ACCESS LAB DRAW WITH PIV START

## 2023-10-07 PROCEDURE — 999N000127 HC STATISTIC PERIPHERAL IV START W US GUIDANCE

## 2023-10-07 PROCEDURE — 36415 COLL VENOUS BLD VENIPUNCTURE: CPT | Performed by: OBSTETRICS & GYNECOLOGY

## 2023-10-07 RX ORDER — CALCIUM GLUCONATE 94 MG/ML
1 INJECTION, SOLUTION INTRAVENOUS
Status: DISCONTINUED | OUTPATIENT
Start: 2023-10-07 | End: 2023-10-11 | Stop reason: HOSPADM

## 2023-10-07 RX ORDER — LIDOCAINE 40 MG/G
CREAM TOPICAL
Status: DISCONTINUED | OUTPATIENT
Start: 2023-10-07 | End: 2023-10-11 | Stop reason: HOSPADM

## 2023-10-07 RX ORDER — LABETALOL HYDROCHLORIDE 5 MG/ML
20-40 INJECTION, SOLUTION INTRAVENOUS EVERY 10 MIN PRN
Status: DISCONTINUED | OUTPATIENT
Start: 2023-10-07 | End: 2023-10-11 | Stop reason: HOSPADM

## 2023-10-07 RX ORDER — MAGNESIUM SULFATE IN WATER 40 MG/ML
2 INJECTION, SOLUTION INTRAVENOUS CONTINUOUS
Status: DISCONTINUED | OUTPATIENT
Start: 2023-10-07 | End: 2023-10-10

## 2023-10-07 RX ORDER — SODIUM CHLORIDE, SODIUM LACTATE, POTASSIUM CHLORIDE, CALCIUM CHLORIDE 600; 310; 30; 20 MG/100ML; MG/100ML; MG/100ML; MG/100ML
10-125 INJECTION, SOLUTION INTRAVENOUS CONTINUOUS
Status: DISCONTINUED | OUTPATIENT
Start: 2023-10-07 | End: 2023-10-11 | Stop reason: HOSPADM

## 2023-10-07 RX ORDER — HYDRALAZINE HYDROCHLORIDE 20 MG/ML
5-10 INJECTION INTRAMUSCULAR; INTRAVENOUS
Status: DISCONTINUED | OUTPATIENT
Start: 2023-10-07 | End: 2023-10-11 | Stop reason: HOSPADM

## 2023-10-07 RX ORDER — MAGNESIUM SULFATE HEPTAHYDRATE 40 MG/ML
2 INJECTION, SOLUTION INTRAVENOUS
Status: DISCONTINUED | OUTPATIENT
Start: 2023-10-07 | End: 2023-10-11 | Stop reason: HOSPADM

## 2023-10-07 RX ORDER — MAGNESIUM SULFATE 4 G/50ML
4 INJECTION INTRAVENOUS
Status: DISCONTINUED | OUTPATIENT
Start: 2023-10-07 | End: 2023-10-11 | Stop reason: HOSPADM

## 2023-10-07 RX ORDER — MAGNESIUM SULFATE 4 G/50ML
4 INJECTION INTRAVENOUS ONCE
Status: COMPLETED | OUTPATIENT
Start: 2023-10-07 | End: 2023-10-07

## 2023-10-07 RX ADMIN — IBUPROFEN 800 MG: 800 TABLET ORAL at 11:57

## 2023-10-07 RX ADMIN — ENOXAPARIN SODIUM 40 MG: 40 INJECTION SUBCUTANEOUS at 09:15

## 2023-10-07 RX ADMIN — LEVOTHYROXINE SODIUM 50 MCG: 0.03 TABLET ORAL at 06:43

## 2023-10-07 RX ADMIN — CETIRIZINE HYDROCHLORIDE 10 MG: 10 TABLET, FILM COATED ORAL at 21:28

## 2023-10-07 RX ADMIN — MAGNESIUM SULFATE HEPTAHYDRATE 4 G: 80 INJECTION, SOLUTION INTRAVENOUS at 12:05

## 2023-10-07 RX ADMIN — MAGNESIUM SULFATE HEPTAHYDRATE 2 G/HR: 40 INJECTION, SOLUTION INTRAVENOUS at 12:33

## 2023-10-07 RX ADMIN — SENNOSIDES AND DOCUSATE SODIUM 2 TABLET: 50; 8.6 TABLET ORAL at 09:16

## 2023-10-07 RX ADMIN — OXYCODONE HYDROCHLORIDE 5 MG: 5 TABLET ORAL at 06:42

## 2023-10-07 RX ADMIN — LABETALOL HYDROCHLORIDE 400 MG: 200 TABLET, FILM COATED ORAL at 09:16

## 2023-10-07 RX ADMIN — SIMETHICONE 80 MG: 80 TABLET, CHEWABLE ORAL at 18:03

## 2023-10-07 RX ADMIN — ENOXAPARIN SODIUM 40 MG: 40 INJECTION SUBCUTANEOUS at 21:29

## 2023-10-07 RX ADMIN — SIMETHICONE 80 MG: 80 TABLET, CHEWABLE ORAL at 03:09

## 2023-10-07 RX ADMIN — OXYCODONE HYDROCHLORIDE 5 MG: 5 TABLET ORAL at 03:05

## 2023-10-07 RX ADMIN — OXYCODONE HYDROCHLORIDE 5 MG: 5 TABLET ORAL at 10:46

## 2023-10-07 RX ADMIN — LABETALOL HYDROCHLORIDE 400 MG: 200 TABLET, FILM COATED ORAL at 21:28

## 2023-10-07 RX ADMIN — SIMETHICONE 80 MG: 80 TABLET, CHEWABLE ORAL at 09:14

## 2023-10-07 RX ADMIN — OXYCODONE HYDROCHLORIDE 5 MG: 5 TABLET ORAL at 19:32

## 2023-10-07 RX ADMIN — OXYCODONE HYDROCHLORIDE 5 MG: 5 TABLET ORAL at 15:00

## 2023-10-07 RX ADMIN — ACETAMINOPHEN 975 MG: 325 TABLET ORAL at 03:04

## 2023-10-07 RX ADMIN — IBUPROFEN 800 MG: 800 TABLET ORAL at 18:03

## 2023-10-07 RX ADMIN — SIMETHICONE 80 MG: 80 TABLET, CHEWABLE ORAL at 11:56

## 2023-10-07 RX ADMIN — ACETAMINOPHEN 975 MG: 325 TABLET ORAL at 21:28

## 2023-10-07 RX ADMIN — SODIUM CHLORIDE, POTASSIUM CHLORIDE, SODIUM LACTATE AND CALCIUM CHLORIDE 25 ML/HR: 600; 310; 30; 20 INJECTION, SOLUTION INTRAVENOUS at 12:04

## 2023-10-07 RX ADMIN — IBUPROFEN 800 MG: 800 TABLET ORAL at 05:27

## 2023-10-07 RX ADMIN — SENNOSIDES AND DOCUSATE SODIUM 2 TABLET: 50; 8.6 TABLET ORAL at 19:32

## 2023-10-07 RX ADMIN — NIFEDIPINE 30 MG: 30 TABLET, EXTENDED RELEASE ORAL at 09:16

## 2023-10-07 RX ADMIN — ACETAMINOPHEN 975 MG: 325 TABLET ORAL at 09:15

## 2023-10-07 RX ADMIN — ACETAMINOPHEN 975 MG: 325 TABLET ORAL at 15:00

## 2023-10-07 ASSESSMENT — ACTIVITIES OF DAILY LIVING (ADL)
ADLS_ACUITY_SCORE: 18

## 2023-10-07 NOTE — PROGRESS NOTES
Postpartum Day 2    Patient Name:  Sola Akers  :  1990  MRN:  [id number]      Assessment:  POD#2. Acute postoperative blood loss anemia, asymptomatic, Hgb 10.0, . Hx of CHTN with elevated BP     Plan:  Continue current care.    Blood pressure medications adjusted last night. Currently labetalol 400 BID and Nifedipine 30 ER every day. These were given this morning at 0900  Discharge home tomorrow if BP is in good control     Subjective:  The patient feels well:  Voiding without difficulty, lochia normal, tolerating normal diet, and passing flatus.  Pain is well controlled with current medications.  The patient has no emotional concerns.  The babies are well.    Birth date:      Oswald Female-NICKIE Selby [[id number]]   10/5/2023      Bianca Akers [[id number]]   10/5/2023     Birth Time:      Ermias Akers-NICKIE Selby [[id number]]   7:51 AM      Bianca Akers [[id number]]   7:54 AM     Prenatal Complications: cHTN, BMI 45, subclinical hypothyroidism, hx , di-di twin pregnancy.     Objective:  BP (!) 159/96   Pulse 109   Temp 98.4  F (36.9  C)   Resp 18   Ht 1.524 m (5')   Wt 114.1 kg (251 lb 8 oz)   LMP 2023   SpO2 97%   Breastfeeding Unknown   BMI 49.12 kg/m    Patient Vitals for the past 24 hrs:   BP Temp Temp src Pulse Resp SpO2 Weight   10/07/23 1003 (!) 159/96 -- -- -- -- -- --   10/07/23 0951 (!) 176/93 98.4  F (36.9  C) -- 109 18 97 % --   10/07/23 0916 (!) 148/98 -- -- 99 18 -- --   10/07/23 0639 133/77 97.8  F (36.6  C) Oral 103 18 97 % 114.1 kg (251 lb 8 oz)   10/07/23 0305 (!) 147/85 98  F (36.7  C) Oral 100 18 98 % --   10/06/23 2330 (!) 151/91 -- -- 96 -- -- --   10/06/23 2319 (!) 161/96 -- -- 101 -- -- --   10/06/23 2232 (!) 147/90 -- -- 93 -- -- --   10/06/23 2150 (!) 153/95 -- -- 94 -- -- --   10/06/23 2108 (!) 148/90 -- -- 93 -- -- --   10/06/23 1925 (!) 143/86 -- -- 90 -- -- --   10/06/23 1811 (!) 156/100 98.3  F (36.8  C)  Oral 95 -- -- --   10/06/23 1738 (!) 152/87 -- -- -- -- 98 % --   10/06/23 1733 (!) 149/85 99.6  F (37.6  C) Oral 85 18 97 % --   10/06/23 1304 139/86 98.2  F (36.8  C) Oral 79 18 99 % --       Exam: Patient A&O x 3. No acute distress, breathing unlabored. The amount and color of the lochia is appropriate for the duration of recovery. Urinary output is adequate. The uncovered low transverse incision is healing well.  The patient is ambulating well without assistance.  The patient is tolerating a regular diet.    Lab Results   Component Value Date    AS Negative 10/05/2023    HGB 10.0 (L) 10/07/2023       Immunization History   Administered Date(s) Administered    COVID-19 MONOVALENT 12+ (Pfizer) 08/20/2021, 09/10/2021    COVID-19 Monovalent 12+ (Pfizer 2022) 02/18/2022    DT (PEDS <7y) 08/09/2002    HPV Quadrivalent 08/31/2007, 02/11/2008, 07/05/2012    HepA, Unspecified 08/31/2007, 07/05/2012    HepB, Unspecified 08/19/2002, 10/01/2002, 05/22/2003    Hib, Unspecified 1990, 03/22/1991, 06/19/1991, 06/15/1992    Historical DTP/aP 1990, 1990, 1990, 06/15/1992, 06/30/1995    Influenza Vaccine >6 months (Alfuria,Fluzone) 10/19/2021, 10/24/2022    Influenza,INJ,MDCK,PF,Quad >6mo(Flucelvax) 09/18/2023    MMR 09/06/1991    Meningococcal ACWY (Menactra ) 08/31/2007    OPV, trivalent, live 1990, 1990, 06/15/1992, 06/30/1995    TDAP (Adacel,Boostrix) 08/31/2007, 09/14/2023    TDAP Vaccine (Adacel) 05/21/2021         Provider:  Coleman Zarco CNM    Date:  10/7/2023  Time:  10:25 AM

## 2023-10-07 NOTE — PLAN OF CARE
Goal Outcome Evaluation: Progressing     Sola's Temp, HR and RR WDL.  Her BP has been elevated most of this shift. (See previous notes.)  Sola reports that after her first birth 2 years ago, her BP also had gone up in the immediate postpartum period and she was readmitted and given Mag sulfate for 24 hours.  She denies HA or vision changes; she has normal reflexes; no clonus, clear lung fields, +2 - +3 edema in her legs and feet.  FFU/U; scant lochia, no clots.  She rates her incisional and back pain a 4-7 and is taking scheduled Tylenol and Ibuprofen; and Oxy.  She ambulates comfortably and ably; she and her  are caring for their babies together.      Problem: Postpartum ( Delivery)  Goal: Hemostasis  Outcome: Progressing     Problem: Postpartum ( Delivery)  Goal: Absence of Infection Signs and Symptoms  Outcome: Progressing     Problem: Postpartum ( Delivery)  Goal: Optimal Pain Control and Function  Outcome: Progressing  Intervention: Prevent or Manage Pain  Recent Flowsheet Documentation  Taken 10/6/2023 2037 by Chani Ludwig, RN  Pain Management Interventions:   medication (see MAR)   ambulation/increased activity  Perineal Care:   absorbent brief/pad changed   perineal spray bottle/warm water use encouraged  Taken 10/6/2023 1733 by Chani Ludwig, RN  Pain Management Interventions: medication (see MAR)  Perineal Care:   perineum cleansed   absorbent brief/pad changed

## 2023-10-07 NOTE — PLAN OF CARE
Goal Outcome Evaluation:      Problem: Postpartum ( Delivery)  Goal: Effective Bowel Elimination  Outcome: Progressing

## 2023-10-07 NOTE — PROGRESS NOTES
Dr King alerted at 1100 to continued elevated blood pressures.  Pt remains asymptomatic.  Asked to come and evaluate the pt in person.  See new orders.

## 2023-10-07 NOTE — PROGRESS NOTES
Postpartum Day 2    Patient Name:  Sola Akers  :  1990  MRN:  [id number]      Assessment: POD #2. Acute postoperative blood loss anemia. History of cHTN now with superimposed preeclampsia with severe features based on blood pressures.     Plan: Labs ordered. IV Magnesium for 24 hours. IV antihypertensive medications as needed. To follow.     Subjective:  The patient feels well:  Voiding without difficulty, lochia normal, tolerating normal diet, and passing flatus.  Denies headache, change in vision, RUQ pain, or increase in swelling. Pain is well controlled with current medications.  The patient has no emotional concerns.  The babies are well.    Birth date:      Ermias Akers-NICKIE Selby [[id number]]   10/5/2023      Bianca Akers [[id number]]   10/5/2023     Birth Time:      Ermias Akers-NICKIE Selby [[id number]]   7:51 AM      Bianca Akers [[id number]]   7:54 AM     Prenatal Complications: cHTN, BMI 45, subclinical hypothyroidism, hx , di-di twin pregnancy.     Objective:  /83   Pulse 109   Temp 98.4  F (36.9  C)   Resp 18   Ht 1.524 m (5')   Wt 114.1 kg (251 lb 8 oz)   LMP 2023   SpO2 97%   Breastfeeding Unknown   BMI 49.12 kg/m    Patient Vitals for the past 24 hrs:   BP Temp Temp src Pulse Resp SpO2 Weight   10/07/23 1120 137/83 -- -- -- -- -- --   10/07/23 1105 (!) 163/86 -- -- -- -- -- --   10/07/23 1050 (!) 153/87 -- -- -- -- -- --   10/07/23 1035 (!) 153/78 -- -- -- -- -- --   10/07/23 1020 (!) 163/86 -- -- -- -- -- --   10/07/23 1003 (!) 159/96 -- -- -- -- -- --   10/07/23 0951 (!) 176/93 98.4  F (36.9  C) -- 109 18 97 % --   10/07/23 0916 (!) 148/98 -- -- 99 18 -- --   10/07/23 0639 133/77 97.8  F (36.6  C) Oral 103 18 97 % 114.1 kg (251 lb 8 oz)   10/07/23 0305 (!) 147/85 98  F (36.7  C) Oral 100 18 98 % --   10/06/23 2330 (!) 151/91 -- -- 96 -- -- --   10/06/23 2319 (!) 161/96 -- -- 101 -- -- --   10/06/23 2232 (!) 147/ -- --  93 -- -- --   10/06/23 2150 (!) 153/95 -- -- 94 -- -- --   10/06/23 2108 (!) 148/90 -- -- 93 -- -- --   10/06/23 1925 (!) 143/86 -- -- 90 -- -- --   10/06/23 1811 (!) 156/100 98.3  F (36.8  C) Oral 95 -- -- --   10/06/23 1738 (!) 152/87 -- -- -- -- 98 % --   10/06/23 1733 (!) 149/85 99.6  F (37.6  C) Oral 85 18 97 % --   10/06/23 1304 139/86 98.2  F (36.8  C) Oral 79 18 99 % --       Exam: Patient A&O x 3. No acute distress, breathing unlabored. The amount and color of the lochia is appropriate for the duration of recovery. Urinary output is adequate. The uncovered low transverse incision is healing well.  The patient is ambulating well without assistance.  The patient is tolerating a regular diet.    Lab Results   Component Value Date    AS Negative 10/05/2023    HGB 10.0 (L) 10/07/2023       Immunization History   Administered Date(s) Administered    COVID-19 MONOVALENT 12+ (Pfizer) 08/20/2021, 09/10/2021    COVID-19 Monovalent 12+ (Pfizer 2022) 02/18/2022    DT (PEDS <7y) 08/09/2002    HPV Quadrivalent 08/31/2007, 02/11/2008, 07/05/2012    HepA, Unspecified 08/31/2007, 07/05/2012    HepB, Unspecified 08/19/2002, 10/01/2002, 05/22/2003    Hib, Unspecified 1990, 03/22/1991, 06/19/1991, 06/15/1992    Historical DTP/aP 1990, 1990, 1990, 06/15/1992, 06/30/1995    Influenza Vaccine >6 months (Alfuria,Fluzone) 10/19/2021, 10/24/2022    Influenza,INJ,MDCK,PF,Quad >6mo(Flucelvax) 09/18/2023    MMR 09/06/1991    Meningococcal ACWY (Menactra ) 08/31/2007    OPV, trivalent, live 1990, 1990, 06/15/1992, 06/30/1995    TDAP (Adacel,Boostrix) 08/31/2007, 09/14/2023    TDAP Vaccine (Adacel) 05/21/2021         Provider:  Clemente King MD    Date:  10/7/2023  Time:  11:39 AM

## 2023-10-07 NOTE — PROGRESS NOTES
Initial blood pressure 148/98 taken just prior to morning medications.  Blood pressure recheck requested by Andre Zarco CNM.  Blood pressure 176/93 at 0950, following oral hypertensive medications given at 0915.  See MAR.  Dr King on the unit and he was alerted to change in vitals and new onset of severe range hypertension.  Reflexes normal, no clonus. Pt denies headache, epigastric pain, or visual changes.   Pt states that she normally feels 'funny' when blood pressure is high but states that she feels fine now.  Blood pressure rechecked after 15 minutes and pressure then 159/96. Pt no longer has IV access. Dr King aware.  Charge RN aware. No additional medication at this time or change in orders.  This RN set blood pressure cuff for every 15 minutes per Dr King. Will continue to monitor.

## 2023-10-07 NOTE — PLAN OF CARE
Problem: Plan of Care - These are the overarching goals to be used throughout the patient stay.    Goal: Optimal Comfort and Wellbeing  Outcome: Progressing  Intervention: Monitor Pain and Promote Comfort  Recent Flowsheet Documentation  Taken 10/7/2023 0305 by Janis Myers RN  Pain Management Interventions: medication (see MAR)  Intervention: Provide Person-Centered Care  Recent Flowsheet Documentation  Taken 10/7/2023 0305 by Janis Myers RN  Trust Relationship/Rapport:   care explained   choices provided   questions answered   questions encouraged   reassurance provided   thoughts/feelings acknowledged    Problem: Postpartum ( Delivery)  Goal: Optimal Pain Control and Function  Intervention: Prevent or Manage Pain  Recent Flowsheet Documentation  Taken 10/7/2023 030 by Janis Myers RN  Pain Management Interventions: medication (see MAR)  Perineal Care:   perineal hygiene encouraged   perineal spray bottle/warm water use encouraged      Goal Outcome Evaluation:  Mother's BP was 147/85, denies any preeclampsia symptoms. Rating a pain of 3/10 due to lower right back pain and some incisional pain, scheduled tylenol and oxycodone PRN given. Mother also stating to have gas pain, simethicone PRN given. Mother and father bonding well with babies through feedings, changing diapers, and holding. Parents requested babies to be in the nursery to promote rest.    Janis Myers RN on 10/7/2023 at 4:35 AM

## 2023-10-07 NOTE — PROGRESS NOTES
Dr. King called about patient's BP of 148/90 and 153/95 taken at 2108 and 2150 respectively.  He ordered 30 mg Nifedipine ER to be administered now.  Will continue to monitor.

## 2023-10-08 LAB
ALT SERPL W P-5'-P-CCNC: 56 U/L (ref 0–50)
AST SERPL W P-5'-P-CCNC: 81 U/L (ref 0–45)
CREAT SERPL-MCNC: 0.72 MG/DL (ref 0.51–0.95)
EGFRCR SERPLBLD CKD-EPI 2021: >90 ML/MIN/1.73M2
HGB BLD-MCNC: 10.5 G/DL (ref 11.7–15.7)
PLATELET # BLD AUTO: 246 10E3/UL (ref 150–450)

## 2023-10-08 PROCEDURE — 85018 HEMOGLOBIN: CPT | Performed by: OBSTETRICS & GYNECOLOGY

## 2023-10-08 PROCEDURE — 82565 ASSAY OF CREATININE: CPT | Performed by: OBSTETRICS & GYNECOLOGY

## 2023-10-08 PROCEDURE — 85049 AUTOMATED PLATELET COUNT: CPT | Performed by: OBSTETRICS & GYNECOLOGY

## 2023-10-08 PROCEDURE — 250N000013 HC RX MED GY IP 250 OP 250 PS 637: Performed by: OBSTETRICS & GYNECOLOGY

## 2023-10-08 PROCEDURE — 258N000003 HC RX IP 258 OP 636: Performed by: OBSTETRICS & GYNECOLOGY

## 2023-10-08 PROCEDURE — 250N000013 HC RX MED GY IP 250 OP 250 PS 637: Performed by: ADVANCED PRACTICE MIDWIFE

## 2023-10-08 PROCEDURE — 250N000011 HC RX IP 250 OP 636: Mod: JZ | Performed by: OBSTETRICS & GYNECOLOGY

## 2023-10-08 PROCEDURE — 84450 TRANSFERASE (AST) (SGOT): CPT | Performed by: OBSTETRICS & GYNECOLOGY

## 2023-10-08 PROCEDURE — 84460 ALANINE AMINO (ALT) (SGPT): CPT | Performed by: OBSTETRICS & GYNECOLOGY

## 2023-10-08 PROCEDURE — 36415 COLL VENOUS BLD VENIPUNCTURE: CPT | Performed by: OBSTETRICS & GYNECOLOGY

## 2023-10-08 PROCEDURE — 120N000001 HC R&B MED SURG/OB

## 2023-10-08 RX ORDER — DIPHENHYDRAMINE HYDROCHLORIDE 50 MG/ML
25 INJECTION INTRAMUSCULAR; INTRAVENOUS EVERY 6 HOURS PRN
Status: DISCONTINUED | OUTPATIENT
Start: 2023-10-08 | End: 2023-10-09

## 2023-10-08 RX ORDER — NIFEDIPINE 30 MG
30 TABLET, EXTENDED RELEASE ORAL 2 TIMES DAILY
Status: DISCONTINUED | OUTPATIENT
Start: 2023-10-08 | End: 2023-10-08

## 2023-10-08 RX ORDER — NIFEDIPINE 30 MG
60 TABLET, EXTENDED RELEASE ORAL 2 TIMES DAILY
Status: DISCONTINUED | OUTPATIENT
Start: 2023-10-09 | End: 2023-10-10

## 2023-10-08 RX ORDER — LABETALOL 200 MG/1
200 TABLET, FILM COATED ORAL 2 TIMES DAILY
Status: DISCONTINUED | OUTPATIENT
Start: 2023-10-08 | End: 2023-10-09

## 2023-10-08 RX ORDER — POLYETHYLENE GLYCOL 3350 17 G/17G
17 POWDER, FOR SOLUTION ORAL DAILY
Status: DISCONTINUED | OUTPATIENT
Start: 2023-10-08 | End: 2023-10-11 | Stop reason: HOSPADM

## 2023-10-08 RX ORDER — DIPHENHYDRAMINE HCL 25 MG
25 CAPSULE ORAL EVERY 6 HOURS PRN
Status: DISCONTINUED | OUTPATIENT
Start: 2023-10-08 | End: 2023-10-09

## 2023-10-08 RX ORDER — NIFEDIPINE 30 MG
30 TABLET, EXTENDED RELEASE ORAL ONCE
Status: COMPLETED | OUTPATIENT
Start: 2023-10-08 | End: 2023-10-08

## 2023-10-08 RX ADMIN — ACETAMINOPHEN 975 MG: 325 TABLET ORAL at 04:06

## 2023-10-08 RX ADMIN — IBUPROFEN 800 MG: 800 TABLET ORAL at 02:06

## 2023-10-08 RX ADMIN — ACETAMINOPHEN 975 MG: 325 TABLET ORAL at 16:52

## 2023-10-08 RX ADMIN — OXYCODONE HYDROCHLORIDE 5 MG: 5 TABLET ORAL at 06:07

## 2023-10-08 RX ADMIN — ACETAMINOPHEN 975 MG: 325 TABLET ORAL at 10:10

## 2023-10-08 RX ADMIN — NIFEDIPINE 30 MG: 30 TABLET, EXTENDED RELEASE ORAL at 08:59

## 2023-10-08 RX ADMIN — ACETAMINOPHEN 975 MG: 325 TABLET ORAL at 22:53

## 2023-10-08 RX ADMIN — NIFEDIPINE 30 MG: 30 TABLET, EXTENDED RELEASE ORAL at 21:58

## 2023-10-08 RX ADMIN — LABETALOL HYDROCHLORIDE 200 MG: 200 TABLET, FILM COATED ORAL at 21:09

## 2023-10-08 RX ADMIN — MAGNESIUM SULFATE HEPTAHYDRATE 2 G/HR: 40 INJECTION, SOLUTION INTRAVENOUS at 07:47

## 2023-10-08 RX ADMIN — OXYCODONE HYDROCHLORIDE 5 MG: 5 TABLET ORAL at 14:09

## 2023-10-08 RX ADMIN — ENOXAPARIN SODIUM 40 MG: 40 INJECTION SUBCUTANEOUS at 09:00

## 2023-10-08 RX ADMIN — OXYCODONE HYDROCHLORIDE 5 MG: 5 TABLET ORAL at 10:10

## 2023-10-08 RX ADMIN — SODIUM CHLORIDE, POTASSIUM CHLORIDE, SODIUM LACTATE AND CALCIUM CHLORIDE 75 ML/HR: 600; 310; 30; 20 INJECTION, SOLUTION INTRAVENOUS at 07:46

## 2023-10-08 RX ADMIN — DIPHENHYDRAMINE HYDROCHLORIDE 25 MG: 25 CAPSULE ORAL at 14:34

## 2023-10-08 RX ADMIN — NIFEDIPINE 30 MG: 30 TABLET, EXTENDED RELEASE ORAL at 19:22

## 2023-10-08 RX ADMIN — IBUPROFEN 800 MG: 800 TABLET ORAL at 14:09

## 2023-10-08 RX ADMIN — IBUPROFEN 800 MG: 800 TABLET ORAL at 20:34

## 2023-10-08 RX ADMIN — LABETALOL HYDROCHLORIDE 400 MG: 200 TABLET, FILM COATED ORAL at 08:59

## 2023-10-08 RX ADMIN — OXYCODONE HYDROCHLORIDE 5 MG: 5 TABLET ORAL at 20:33

## 2023-10-08 RX ADMIN — LEVOTHYROXINE SODIUM 50 MCG: 0.03 TABLET ORAL at 08:15

## 2023-10-08 RX ADMIN — IBUPROFEN 800 MG: 800 TABLET ORAL at 08:13

## 2023-10-08 RX ADMIN — SENNOSIDES AND DOCUSATE SODIUM 2 TABLET: 50; 8.6 TABLET ORAL at 20:34

## 2023-10-08 RX ADMIN — POLYETHYLENE GLYCOL 3350 17 G: 17 POWDER, FOR SOLUTION ORAL at 14:34

## 2023-10-08 RX ADMIN — CETIRIZINE HYDROCHLORIDE 10 MG: 10 TABLET, FILM COATED ORAL at 20:34

## 2023-10-08 RX ADMIN — OXYCODONE HYDROCHLORIDE 5 MG: 5 TABLET ORAL at 02:08

## 2023-10-08 RX ADMIN — ENOXAPARIN SODIUM 40 MG: 40 INJECTION SUBCUTANEOUS at 20:34

## 2023-10-08 RX ADMIN — SENNOSIDES AND DOCUSATE SODIUM 2 TABLET: 50; 8.6 TABLET ORAL at 08:13

## 2023-10-08 ASSESSMENT — ACTIVITIES OF DAILY LIVING (ADL)
ADLS_ACUITY_SCORE: 18

## 2023-10-08 NOTE — PLAN OF CARE
Problem: Plan of Care - These are the overarching goals to be used throughout the patient stay.    Goal: Optimal Comfort and Wellbeing  Intervention: Monitor Pain and Promote Comfort  Recent Flowsheet Documentation  Taken 10/7/2023 2128 by Marjorie Alaniz RN  Pain Management Interventions: medication (see MAR)  Taken 10/7/2023 1932 by Marjorie Alaniz, RN  Pain Management Interventions: medication (see MAR)  Taken 10/7/2023 1803 by Marjorie Alaniz RN  Pain Management Interventions: medication (see MAR)   Goal Outcome Evaluation:      Plan of Care Reviewed With: patient    Overall Patient Progress: improvingOverall Patient Progress: improving     Patient taking scheduled tylenol and ibuprofen with oxycodone PRN for pain control.    Patient up ad sadie.  present in room. Walked once with  today in hallway.

## 2023-10-08 NOTE — PROGRESS NOTES
Sola Akers  2023    S: pt doing well.  Pain well controlled,  Ambulating without difficulty.     O: /79   Pulse 108   Temp 98.1  F (36.7  C) (Oral)   Resp 18   Ht 1.524 m (5')   Wt 114.1 kg (251 lb 8 oz)   LMP 2023   SpO2 98%   Breastfeeding Unknown   BMI 49.12 kg/m    Recent Labs   Lab 10/08/23  0657 10/07/23  1151 10/07/23  0625 10/06/23  0515 10/05/23  0521   HGB 10.5* 10.5* 10.0* 9.7* 11.3*     Abdomen: soft, non tender, fundus firm below the umbilicus  Ext: non tender, no edema or erythema    A/P: s/p  POD #3, pre-eclampsia with severe features, twins, history of pre-elcampsia with previous pregnancy, needed re-admission.  Repeat labs tomorrow, AST was elevated to 81 this am  Continue magnesium at this time, will stop this afternoon  Continue the labetalol 400 mg BID, procardia XL 30 mg.       Mily Murrell MD

## 2023-10-08 NOTE — PROVIDER NOTIFICATION
"   10/08/23 1356   Provider Notification   Provider Name/Title Dr. Murrell   Method of Notification Electronic Page   Request Evaluate-Remote   Notification Reason Vital Signs Change;Status Update     Dr. Murrell paged, \"Pt c/o itchiness, can we do some PRN benadryl? [phone] if you want to chat, thanks!\"    MD called back. Via [facility] ordered 25 mg benadryl po q6PRN and miralax powder PRN. Also updated on low BP, reduced labetolol to 200mg po BID.  "

## 2023-10-08 NOTE — PROGRESS NOTES
Sola is 64 hrs post-op and doing well. Magesium is at 2g/hr and LR at 75cc/hr, plan to discontinue after 24 hours of infusion (10/8 at 1200). Is currently on 400mg labetalol BID for antihypertension regimen- Bps remain WNL with most recent vital signs: /60 (BP Location: Right arm, Patient Position: Semi-Stubbs's, Cuff Size: Adult Regular)   Pulse 108   Temp 98.1  F (36.7  C) (Oral)   Resp 18   Ht 1.524 m (5')   Wt 114.1 kg (251 lb 8 oz)   LMP 01/18/2023   SpO2 98%   Breastfeeding Unknown   BMI 49.12 kg/m  . Denies headache/vision change/dyspnea/nausea/vomiting/RUQ pain. Will continue to monitor I/Os/daily weights. Pain is well controlled with tylenol/ibuprofen and prn oxycodone. Endorsing some gas pain- taking simethicone.  Incision is intact with no signs of infection. Voiding spontaneously. Is able to pass flatus. Clustering cares overnight to encourage rest.   Ifrah Araujo RN on 10/8/2023 at 12:02 AM

## 2023-10-09 ENCOUNTER — APPOINTMENT (OUTPATIENT)
Dept: ULTRASOUND IMAGING | Facility: HOSPITAL | Age: 33
End: 2023-10-09
Attending: STUDENT IN AN ORGANIZED HEALTH CARE EDUCATION/TRAINING PROGRAM
Payer: COMMERCIAL

## 2023-10-09 LAB
ALBUMIN SERPL BCG-MCNC: 3 G/DL (ref 3.5–5.2)
ALBUMIN SERPL BCG-MCNC: 3 G/DL (ref 3.5–5.2)
ALP SERPL-CCNC: 141 U/L (ref 35–104)
ALP SERPL-CCNC: 148 U/L (ref 35–104)
ALT SERPL W P-5'-P-CCNC: 91 U/L (ref 0–50)
ALT SERPL W P-5'-P-CCNC: 92 U/L (ref 0–50)
ANION GAP SERPL CALCULATED.3IONS-SCNC: 11 MMOL/L (ref 7–15)
ANION GAP SERPL CALCULATED.3IONS-SCNC: 9 MMOL/L (ref 7–15)
AST SERPL W P-5'-P-CCNC: 71 U/L (ref 0–45)
AST SERPL W P-5'-P-CCNC: 87 U/L (ref 0–45)
BILIRUB SERPL-MCNC: 0.2 MG/DL
BILIRUB SERPL-MCNC: 0.2 MG/DL
BUN SERPL-MCNC: 4.3 MG/DL (ref 6–20)
BUN SERPL-MCNC: 4.9 MG/DL (ref 6–20)
CALCIUM SERPL-MCNC: 8.1 MG/DL (ref 8.6–10)
CALCIUM SERPL-MCNC: 8.9 MG/DL (ref 8.6–10)
CHLORIDE SERPL-SCNC: 102 MMOL/L (ref 98–107)
CHLORIDE SERPL-SCNC: 104 MMOL/L (ref 98–107)
CREAT SERPL-MCNC: 0.66 MG/DL (ref 0.51–0.95)
CREAT SERPL-MCNC: 0.67 MG/DL (ref 0.51–0.95)
DEPRECATED HCO3 PLAS-SCNC: 23 MMOL/L (ref 22–29)
DEPRECATED HCO3 PLAS-SCNC: 24 MMOL/L (ref 22–29)
EGFRCR SERPLBLD CKD-EPI 2021: >90 ML/MIN/1.73M2
EGFRCR SERPLBLD CKD-EPI 2021: >90 ML/MIN/1.73M2
ERYTHROCYTE [DISTWIDTH] IN BLOOD BY AUTOMATED COUNT: 13.4 % (ref 10–15)
GLUCOSE SERPL-MCNC: 117 MG/DL (ref 70–99)
GLUCOSE SERPL-MCNC: 79 MG/DL (ref 70–99)
HBV SURFACE AG SERPL QL IA: NONREACTIVE
HCT VFR BLD AUTO: 33.2 % (ref 35–47)
HGB BLD-MCNC: 10.9 G/DL (ref 11.7–15.7)
HIV 1+2 AB+HIV1 P24 AG SERPL QL IA: NONREACTIVE
HIV 1+2 AB+HIV1P24 AG SERPLBLD IA.RAPID: NON REACTIVE
HIV 1+2 AB+HIV1P24 AG SERPLBLD IA.RAPID: NON REACTIVE
HIV INTERPRETATION: NORMAL
MCH RBC QN AUTO: 30.7 PG (ref 26.5–33)
MCHC RBC AUTO-ENTMCNC: 32.8 G/DL (ref 31.5–36.5)
MCV RBC AUTO: 94 FL (ref 78–100)
PLATELET # BLD AUTO: 246 10E3/UL (ref 150–450)
POTASSIUM SERPL-SCNC: 3.9 MMOL/L (ref 3.4–5.3)
POTASSIUM SERPL-SCNC: 4.1 MMOL/L (ref 3.4–5.3)
PROT SERPL-MCNC: 5.4 G/DL (ref 6.4–8.3)
PROT SERPL-MCNC: 5.5 G/DL (ref 6.4–8.3)
RBC # BLD AUTO: 3.55 10E6/UL (ref 3.8–5.2)
SODIUM SERPL-SCNC: 134 MMOL/L (ref 135–145)
SODIUM SERPL-SCNC: 139 MMOL/L (ref 135–145)
WBC # BLD AUTO: 5.8 10E3/UL (ref 4–11)

## 2023-10-09 PROCEDURE — 82435 ASSAY OF BLOOD CHLORIDE: CPT | Performed by: STUDENT IN AN ORGANIZED HEALTH CARE EDUCATION/TRAINING PROGRAM

## 2023-10-09 PROCEDURE — 84155 ASSAY OF PROTEIN SERUM: CPT | Performed by: STUDENT IN AN ORGANIZED HEALTH CARE EDUCATION/TRAINING PROGRAM

## 2023-10-09 PROCEDURE — 250N000013 HC RX MED GY IP 250 OP 250 PS 637: Performed by: ADVANCED PRACTICE MIDWIFE

## 2023-10-09 PROCEDURE — 250N000011 HC RX IP 250 OP 636: Mod: JZ | Performed by: OBSTETRICS & GYNECOLOGY

## 2023-10-09 PROCEDURE — 999N000104 HEPATITIS C RNA, QUANTITATIVE BY PCR

## 2023-10-09 PROCEDURE — 250N000013 HC RX MED GY IP 250 OP 250 PS 637: Performed by: STUDENT IN AN ORGANIZED HEALTH CARE EDUCATION/TRAINING PROGRAM

## 2023-10-09 PROCEDURE — 36415 COLL VENOUS BLD VENIPUNCTURE: CPT | Performed by: STUDENT IN AN ORGANIZED HEALTH CARE EDUCATION/TRAINING PROGRAM

## 2023-10-09 PROCEDURE — 36415 COLL VENOUS BLD VENIPUNCTURE: CPT

## 2023-10-09 PROCEDURE — 85027 COMPLETE CBC AUTOMATED: CPT | Performed by: OBSTETRICS & GYNECOLOGY

## 2023-10-09 PROCEDURE — 120N000001 HC R&B MED SURG/OB

## 2023-10-09 PROCEDURE — 82374 ASSAY BLOOD CARBON DIOXIDE: CPT | Performed by: STUDENT IN AN ORGANIZED HEALTH CARE EDUCATION/TRAINING PROGRAM

## 2023-10-09 PROCEDURE — 250N000013 HC RX MED GY IP 250 OP 250 PS 637: Performed by: OBSTETRICS & GYNECOLOGY

## 2023-10-09 PROCEDURE — 76705 ECHO EXAM OF ABDOMEN: CPT

## 2023-10-09 PROCEDURE — 80053 COMPREHEN METABOLIC PANEL: CPT | Performed by: OBSTETRICS & GYNECOLOGY

## 2023-10-09 PROCEDURE — 36415 COLL VENOUS BLD VENIPUNCTURE: CPT | Performed by: OBSTETRICS & GYNECOLOGY

## 2023-10-09 RX ORDER — DIPHENHYDRAMINE HCL 25 MG
50 CAPSULE ORAL EVERY 6 HOURS PRN
Status: DISCONTINUED | OUTPATIENT
Start: 2023-10-09 | End: 2023-10-10

## 2023-10-09 RX ORDER — LABETALOL 200 MG/1
200 TABLET, FILM COATED ORAL ONCE
Status: COMPLETED | OUTPATIENT
Start: 2023-10-09 | End: 2023-10-09

## 2023-10-09 RX ORDER — LABETALOL 200 MG/1
400 TABLET, FILM COATED ORAL EVERY 12 HOURS SCHEDULED
Status: DISCONTINUED | OUTPATIENT
Start: 2023-10-09 | End: 2023-10-10

## 2023-10-09 RX ORDER — DIPHENHYDRAMINE HYDROCHLORIDE 50 MG/ML
25 INJECTION INTRAMUSCULAR; INTRAVENOUS EVERY 6 HOURS PRN
Status: DISCONTINUED | OUTPATIENT
Start: 2023-10-09 | End: 2023-10-10 | Stop reason: DRUGHIGH

## 2023-10-09 RX ADMIN — ENOXAPARIN SODIUM 40 MG: 40 INJECTION SUBCUTANEOUS at 08:46

## 2023-10-09 RX ADMIN — NIFEDIPINE 60 MG: 30 TABLET, EXTENDED RELEASE ORAL at 21:24

## 2023-10-09 RX ADMIN — DIPHENHYDRAMINE HYDROCHLORIDE 25 MG: 25 CAPSULE ORAL at 13:07

## 2023-10-09 RX ADMIN — CETIRIZINE HYDROCHLORIDE 10 MG: 10 TABLET, FILM COATED ORAL at 20:02

## 2023-10-09 RX ADMIN — DIPHENHYDRAMINE HYDROCHLORIDE 25 MG: 25 CAPSULE ORAL at 18:55

## 2023-10-09 RX ADMIN — POLYETHYLENE GLYCOL 3350 17 G: 17 POWDER, FOR SOLUTION ORAL at 08:12

## 2023-10-09 RX ADMIN — LABETALOL HYDROCHLORIDE 200 MG: 200 TABLET, FILM COATED ORAL at 08:11

## 2023-10-09 RX ADMIN — IBUPROFEN 800 MG: 800 TABLET ORAL at 20:02

## 2023-10-09 RX ADMIN — OXYCODONE HYDROCHLORIDE 5 MG: 5 TABLET ORAL at 13:07

## 2023-10-09 RX ADMIN — OXYCODONE HYDROCHLORIDE 5 MG: 5 TABLET ORAL at 16:50

## 2023-10-09 RX ADMIN — OXYCODONE HYDROCHLORIDE 5 MG: 5 TABLET ORAL at 21:25

## 2023-10-09 RX ADMIN — SIMETHICONE 80 MG: 80 TABLET, CHEWABLE ORAL at 18:55

## 2023-10-09 RX ADMIN — LABETALOL HYDROCHLORIDE 400 MG: 200 TABLET, FILM COATED ORAL at 20:02

## 2023-10-09 RX ADMIN — SENNOSIDES AND DOCUSATE SODIUM 2 TABLET: 50; 8.6 TABLET ORAL at 08:13

## 2023-10-09 RX ADMIN — OXYCODONE HYDROCHLORIDE 5 MG: 5 TABLET ORAL at 00:43

## 2023-10-09 RX ADMIN — NIFEDIPINE 60 MG: 30 TABLET, EXTENDED RELEASE ORAL at 08:11

## 2023-10-09 RX ADMIN — IBUPROFEN 800 MG: 800 TABLET ORAL at 03:05

## 2023-10-09 RX ADMIN — SENNOSIDES AND DOCUSATE SODIUM 1 TABLET: 50; 8.6 TABLET ORAL at 21:25

## 2023-10-09 RX ADMIN — DIPHENHYDRAMINE HYDROCHLORIDE 25 MG: 25 CAPSULE ORAL at 03:05

## 2023-10-09 RX ADMIN — IBUPROFEN 800 MG: 800 TABLET ORAL at 08:45

## 2023-10-09 RX ADMIN — SIMETHICONE 80 MG: 80 TABLET, CHEWABLE ORAL at 00:42

## 2023-10-09 RX ADMIN — ACETAMINOPHEN 975 MG: 325 TABLET ORAL at 05:07

## 2023-10-09 RX ADMIN — OXYCODONE HYDROCHLORIDE 5 MG: 5 TABLET ORAL at 08:45

## 2023-10-09 RX ADMIN — LABETALOL HYDROCHLORIDE 200 MG: 200 TABLET, FILM COATED ORAL at 08:43

## 2023-10-09 RX ADMIN — LEVOTHYROXINE SODIUM 50 MCG: 0.03 TABLET ORAL at 06:42

## 2023-10-09 RX ADMIN — OXYCODONE HYDROCHLORIDE 5 MG: 5 TABLET ORAL at 05:07

## 2023-10-09 RX ADMIN — IBUPROFEN 800 MG: 800 TABLET ORAL at 14:34

## 2023-10-09 ASSESSMENT — ACTIVITIES OF DAILY LIVING (ADL)
ADLS_ACUITY_SCORE: 18

## 2023-10-09 NOTE — PROGRESS NOTES
Dr. Murrell was notified again of patient's BP of 169/84 and 152/85 at 2108 and 2123, respectively.  Dr. Murrell ordered another 30 mg procardia ER once now.  She also changed the previous order for 30 mg procardia ER twice daily to 60 mg twice daily to begin tomorrow morning at 0900.  If patient experiences HA or vision changes, she plans to restart Mag Sulfate.

## 2023-10-09 NOTE — PLAN OF CARE
Goal Outcome Evaluation: BP elevated again      Plan of Care Reviewed With: patient, spouse    Overall Patient Progress: decliningOverall Patient Progress: declining    Outcome Evaluation: Blood pressures currently trending up. (See previous notes.)     Sola's Temp, HR, and RR WDL.  FFU/1, no lochia or clots.  No HA or vision changes; lung fields clear; normal reflexes, no clonus.  Tylenol, Ibuprofen and Oxy for abdominal and back pain.  She complains primarily of low back pain that goes around to her R hip.  She describes it as burning pain.  Sola has passed 2 stools this evening.  She is lovingly caring for her babies with her .      Problem: Postpartum ( Delivery)  Goal: Successful Maternal Role Transition  Outcome: Progressing  Goal: Hemostasis  Outcome: Progressing  Goal: Effective Bowel Elimination  Outcome: Met  Goal: Fluid and Electrolyte Balance  Outcome: Progressing  Goal: Absence of Infection Signs and Symptoms  Outcome: Progressing  Goal: Optimal Pain Control and Function  Outcome: Progressing  Intervention: Prevent or Manage Pain  Recent Flowsheet Documentation  Taken 10/8/2023 1652 by Chani Ludwig, RN  Pain Management Interventions:   medication (see MAR)   ambulation/increased activity  Perineal Care:   absorbent brief/pad changed   perineal spray bottle/warm water use encouraged  Goal: Effective Oxygenation and Ventilation  Intervention: Optimize Oxygenation and Ventilation  Recent Flowsheet Documentation  Taken 10/8/2023 1652 by Chani Ludwig, RN  Head of Bed (HOB) Positioning: HOB at 60-90 degrees

## 2023-10-09 NOTE — PLAN OF CARE
Problem: Plan of Care - These are the overarching goals to be used throughout the patient stay.    Goal: Optimal Comfort and Wellbeing  Outcome: Progressing  Intervention: Monitor Pain and Promote Comfort  Recent Flowsheet Documentation  Taken 10/9/2023 0043 by Janis Myers RN  Pain Management Interventions: medication (see MAR)  Intervention: Provide Person-Centered Care  Recent Flowsheet Documentation  Taken 10/9/2023 0043 by Janis Myers RN  Trust Relationship/Rapport:   care explained   choices provided   questions answered   questions encouraged   thoughts/feelings acknowledged     Problem: Postpartum ( Delivery)  Goal: Optimal Pain Control and Function  Outcome: Progressing  Intervention: Prevent or Manage Pain  Recent Flowsheet Documentation  Taken 10/9/2023 0043 by Janis Myers RN  Pain Management Interventions: medication (see MAR)  Perineal Care:   perineal spray bottle/warm water use encouraged   perineal hygiene encouraged     Problem: Hypertensive Disorders in Pregnancy  Goal: Maternal-Fetal Stabilization  Outcome: Progressing    Goal Outcome Evaluation:  Mother's VSS stable except BP. Had a BP of 143/77 and 159/80. Denies symptoms for preeclampsia, 0 clonus, and 2+ reflexes. Fundus firm 1 cm under umbilicus. Stating to have minimal pain of 1-2/10 due to lower right back pain, oxycodone PRN given, scheduled tylenol and ibuprofen given, and simethicone given. Mother stated to feel itchy and requested to for benadryl PRN. Mother up and ambulating, voiding and stooling without any complications.     Janis Myers RN on 10/9/2023 at 5:29 AM

## 2023-10-09 NOTE — PROGRESS NOTES
Postpartum Day 4    Patient Name:  Sola Akers  :  1990  MRN:  [id number]      Assessment:    G 2  with cHTN with superimposed Preeclampsia s/p scheduled C/S  Elevated AST/ALT/Alk phos  New onset body rash    Plan:    Stop tylenol  Plan abdominal US  Labetalol dose change back to 400 BID  Redraw labs at 6pm      Subjective:  The patient feels well. Had some gas pain last night which was resolved after simethecone. Labetalol dose decreased from from 400 to 200 in response to episode of hypotension. BPs have been elevated after the decrease. In addition, LFTs have increased for the 3rd day in a row. Per consultation with Dr. Gallagher, will increase labetalol back to 400mg, discontinue tylenol, get abdominal US. Denies URQ pain, headache, visual changes. Will recheck labs again at 6pm. Patient also notes diffuse full body rash which is itchy. Has been taking benadryl for this. She is suspicious that it was caused by nifedipine.    Voiding without difficulty, lochia normal, tolerating normal diet, ambulating and passing flatus.  Pain is well controlled with current medications.  The patient has no emotional concerns.  The babies are well.    Birth date:      Oswald Female-NICKIE Selby [[id number]]   10/[birth year]20[birth year]      Oswald Female-RANJIT Selby [[id number]]   10/5/2023   Birth Time:      Ermias Akers-NICKIE Selby [[id number]]   7:51 AM      Ermias Akers-RANJIT Selby [[id number]]   7:54 AM     Prenatal complications: cHTN, BMI 45, subclinical hypothyroidism, hx , di-di twin pregnancy.      Objective:  BP (!) 165/85 (Patient Position: Semi-Stubbs's)   Pulse 111   Temp 98  F (36.7  C) (Oral)   Resp 20   Ht 1.524 m (5')   Wt 116.3 kg (256 lb 6.4 oz)   LMP 2023   SpO2 99%   Breastfeeding Unknown   BMI 50.07 kg/m    Patient Vitals for the past 24 hrs:   BP Temp Temp src Pulse Resp SpO2 Weight   10/09/23 0809 (!) 165/85 98  F (36.7  C) Oral 111 20 -- --   10/09/23 0645 -- -- -- --  -- -- 116.3 kg (256 lb 6.4 oz)   10/09/23 0507 (!) 159/80 97.8  F (36.6  C) Oral 95 18 99 % --   10/09/23 0031 (!) 143/77 98.3  F (36.8  C) Oral 107 18 97 % --   10/08/23 2251 (!) 150/91 -- -- 96 -- -- --   10/08/23 2123 (!) 152/85 -- -- 94 -- -- --   10/08/23 2108 (!) 169/84 -- -- 100 -- -- --   10/08/23 1858 (!) 147/91 -- -- 97 -- -- --   10/08/23 1733 134/69 -- -- -- -- -- --   10/08/23 1730 139/77 -- -- 93 18 96 % --   10/08/23 1652 -- 99.4  F (37.4  C) Oral -- -- -- --   10/08/23 1500 119/63 -- -- -- -- -- --   10/08/23 1451 -- -- -- -- 16 -- --   10/08/23 1350 96/55 -- -- -- 16 -- --   10/08/23 1204 114/75 97.9  F (36.6  C) Oral -- 16 -- --   10/08/23 1200 -- -- -- -- -- 95 % --   10/08/23 1010 130/71 -- -- -- 16 98 % 118.1 kg (260 lb 4.8 oz)   10/08/23 0859 129/78 -- -- -- -- -- --       Exam: Patient A&O x 3. No acute distress, breathing unlabored. The amount and color of the lochia is appropriate for the duration of recovery. Uterine fundus is firm at U-1.  The incision is healing well. The patient is ambulating well. The patient is tolerating a regular diet.    Lab Results   Component Value Date    AS Negative 10/05/2023    HGB 10.9 (L) 10/09/2023       Immunization History   Administered Date(s) Administered    COVID-19 MONOVALENT 12+ (Pfizer) 08/20/2021, 09/10/2021    COVID-19 Monovalent 12+ (Pfizer 2022) 02/18/2022    DT (PEDS <7y) 08/09/2002    HPV Quadrivalent 08/31/2007, 02/11/2008, 07/05/2012    HepA, Unspecified 08/31/2007, 07/05/2012    HepB, Unspecified 08/19/2002, 10/01/2002, 05/22/2003    Hib, Unspecified 1990, 03/22/1991, 06/19/1991, 06/15/1992    Historical DTP/aP 1990, 1990, 1990, 06/15/1992, 06/30/1995    Influenza Vaccine >6 months (Alfuria,Fluzone) 10/19/2021, 10/24/2022    Influenza,INJ,MDCK,PF,Quad >6mo(Flucelvax) 09/18/2023    MMR 09/06/1991    Meningococcal ACWY (Menactra ) 08/31/2007    OPV, trivalent, live 1990, 1990, 06/15/1992, 06/30/1995     TDAP (Adacel,Boostrix) 08/31/2007, 09/14/2023    TDAP Vaccine (Adacel) 05/21/2021       Provider:  EVELIN Becerra CNM on 10/9/2023 at 10:23 AM      Date:  10/9/2023  Time:  8:44 AM

## 2023-10-09 NOTE — PROGRESS NOTES
Patient remains stable. OB check WNL. Patient having CMP drawn at 1830. Will update next nurse to update provider with results. BP WNL this shift. Will continue to monitor and update with any concerns.    Rosemary Nickerson RN

## 2023-10-09 NOTE — DISCHARGE INSTRUCTIONS
Warning Signs after Having a Baby    Keep this paper on your fridge or somewhere else where you can see it.    Call your provider if you have any of these symptoms up to 12 weeks after having your baby.    Thoughts of hurting yourself or your baby  Pain in your chest or trouble breathing  Severe headache not helped by pain medicine  Eyesight concerns (blurry vision, seeing spots or flashes of light, other changes to eyesight)  Fainting, shaking or other signs of a seizure    Call 9-1-1 if you feel that it is an emergency.     The symptoms below can happen to anyone after giving birth. They can be very serious. Call your provider if you have any of these warning signs.    My provider s phone number: _______________________    Losing too much blood (hemorrhage)    Call your provider if you soak through a pad in less than an hour or pass blood clots bigger than a golf ball. These may be signs that you are bleeding too much.    Blood clots in the legs or lungs    After you give birth, your body naturally clots its blood to help prevent blood loss. Sometimes this increased clotting can happen in other areas of the body, like the legs or lungs. This can block your blood flow and be very dangerous.     Call your provider if you:  Have a red, swollen spot on the back of your leg that is warm or painful when you touch it.   Are coughing up blood.     Infection    Call your provider if you have any of these symptoms:  Fever of 100.4 F (38 C) or higher.  Pain or redness around your stitches if you had an incision.   Any yellow, white, or green fluid coming from places where you had stitches or surgery.    Mood Problems (postpartum depression)    Many people feel sad or have mood changes after having a baby. But for some people, these mood swings are worse.     Call your provider right away if you feel so anxious or nervous that you can't care for yourself or your baby.    Preeclampsia (high blood pressure)    Even if you  didn't have high blood pressure when you were pregnant, you are at risk for the high blood pressure disease called preeclampsia. This risk can last up to 12 weeks after giving birth.     Call your provider if you have:   Pain on your right side under your rib cage  Sudden swelling in the hands and face    Remember: You know your body. If something doesn't feel right, get medical help.     For informational purposes only. Not to replace the advice of your health care provider. Copyright 2020 [facility]. All rights reserved. Clinically reviewed by Keli Rosales, RNC-OB, MSN. [employer] [id number] - Rev 02/23.

## 2023-10-09 NOTE — PLAN OF CARE
Problem: Plan of Care - These are the overarching goals to be used throughout the patient stay.    Goal: Plan of Care Review  Description: The Plan of Care Review/Shift note should be completed every shift.  The Outcome Evaluation is a brief statement about your assessment that the patient is improving, declining, or no change.  This information will be displayed automatically on your shift note.  Outcome: Progressing   Goal Outcome Evaluation:       Infant vss and assessments stable, infant voiding and stooling. Formula feeding without difficulty.

## 2023-10-09 NOTE — PROGRESS NOTES
Dr. Murrell was notified of patient's BP of 139/77 and 147/91 at 1730 and 1858 respectively.  She ordered 30 mg procardia ER 2 X daily to start now.  Continue with Labetalol as ordered.

## 2023-10-09 NOTE — PROVIDER NOTIFICATION
10/09/23 1059 10/09/23 1100 10/09/23 1125   Vital Signs   BP (!) 161/90 (!) 148/86 (!) 153/85      10/09/23 1126   Vital Signs   BP (!) 145/83      updated regarding pt's elevated blood pressure. Pt denies headache, visual changes and epigastric pain. Reflexes within normal limits. No clonus.   Will recheck blood pressure within two hours.

## 2023-10-10 LAB
ALT SERPL W P-5'-P-CCNC: 77 U/L (ref 0–50)
AST SERPL W P-5'-P-CCNC: 50 U/L (ref 0–45)
CREAT SERPL-MCNC: 0.72 MG/DL (ref 0.51–0.95)
EGFRCR SERPLBLD CKD-EPI 2021: >90 ML/MIN/1.73M2
HCV RNA SERPL NAA+PROBE-ACNC: NOT DETECTED IU/ML
HGB BLD-MCNC: 10.7 G/DL (ref 11.7–15.7)
PATH REPORT.COMMENTS IMP SPEC: NORMAL
PATH REPORT.FINAL DX SPEC: NORMAL
PATH REPORT.GROSS SPEC: NORMAL
PATH REPORT.MICROSCOPIC SPEC OTHER STN: NORMAL
PATH REPORT.RELEVANT HX SPEC: NORMAL
PHOTO IMAGE: NORMAL
PLATELET # BLD AUTO: 237 10E3/UL (ref 150–450)

## 2023-10-10 PROCEDURE — 250N000013 HC RX MED GY IP 250 OP 250 PS 637: Performed by: OBSTETRICS & GYNECOLOGY

## 2023-10-10 PROCEDURE — 84450 TRANSFERASE (AST) (SGOT): CPT | Performed by: OBSTETRICS & GYNECOLOGY

## 2023-10-10 PROCEDURE — 120N000001 HC R&B MED SURG/OB

## 2023-10-10 PROCEDURE — 85018 HEMOGLOBIN: CPT | Performed by: OBSTETRICS & GYNECOLOGY

## 2023-10-10 PROCEDURE — 85049 AUTOMATED PLATELET COUNT: CPT | Performed by: OBSTETRICS & GYNECOLOGY

## 2023-10-10 PROCEDURE — 84460 ALANINE AMINO (ALT) (SGPT): CPT | Performed by: OBSTETRICS & GYNECOLOGY

## 2023-10-10 PROCEDURE — 250N000013 HC RX MED GY IP 250 OP 250 PS 637: Performed by: STUDENT IN AN ORGANIZED HEALTH CARE EDUCATION/TRAINING PROGRAM

## 2023-10-10 PROCEDURE — 250N000011 HC RX IP 250 OP 636: Mod: JZ | Performed by: ADVANCED PRACTICE MIDWIFE

## 2023-10-10 PROCEDURE — 250N000011 HC RX IP 250 OP 636: Mod: JZ | Performed by: STUDENT IN AN ORGANIZED HEALTH CARE EDUCATION/TRAINING PROGRAM

## 2023-10-10 PROCEDURE — 36415 COLL VENOUS BLD VENIPUNCTURE: CPT | Performed by: OBSTETRICS & GYNECOLOGY

## 2023-10-10 PROCEDURE — 250N000013 HC RX MED GY IP 250 OP 250 PS 637: Performed by: ADVANCED PRACTICE MIDWIFE

## 2023-10-10 PROCEDURE — 250N000011 HC RX IP 250 OP 636: Mod: JZ

## 2023-10-10 PROCEDURE — 82565 ASSAY OF CREATININE: CPT | Performed by: OBSTETRICS & GYNECOLOGY

## 2023-10-10 RX ORDER — DIPHENHYDRAMINE HYDROCHLORIDE 50 MG/ML
50 INJECTION INTRAMUSCULAR; INTRAVENOUS ONCE
Status: COMPLETED | OUTPATIENT
Start: 2023-10-10 | End: 2023-10-10

## 2023-10-10 RX ORDER — HYDRALAZINE HYDROCHLORIDE 25 MG/1
25 TABLET, FILM COATED ORAL 4 TIMES DAILY
Status: DISCONTINUED | OUTPATIENT
Start: 2023-10-10 | End: 2023-10-11 | Stop reason: HOSPADM

## 2023-10-10 RX ORDER — DIPHENHYDRAMINE HYDROCHLORIDE 50 MG/ML
INJECTION INTRAMUSCULAR; INTRAVENOUS
Status: COMPLETED
Start: 2023-10-10 | End: 2023-10-10

## 2023-10-10 RX ORDER — DIPHENHYDRAMINE HCL 25 MG
25-50 CAPSULE ORAL EVERY 6 HOURS
Status: DISCONTINUED | OUTPATIENT
Start: 2023-10-10 | End: 2023-10-11 | Stop reason: HOSPADM

## 2023-10-10 RX ORDER — METHYLPREDNISOLONE SODIUM SUCCINATE 40 MG/ML
40 INJECTION, POWDER, LYOPHILIZED, FOR SOLUTION INTRAMUSCULAR; INTRAVENOUS ONCE
Status: COMPLETED | OUTPATIENT
Start: 2023-10-10 | End: 2023-10-10

## 2023-10-10 RX ADMIN — OXYCODONE HYDROCHLORIDE 5 MG: 5 TABLET ORAL at 06:53

## 2023-10-10 RX ADMIN — IBUPROFEN 800 MG: 800 TABLET ORAL at 20:10

## 2023-10-10 RX ADMIN — IBUPROFEN 800 MG: 800 TABLET ORAL at 01:59

## 2023-10-10 RX ADMIN — METHYLPREDNISOLONE SODIUM SUCCINATE 40 MG: 40 INJECTION, POWDER, FOR SOLUTION INTRAMUSCULAR; INTRAVENOUS at 14:08

## 2023-10-10 RX ADMIN — DIPHENHYDRAMINE HYDROCHLORIDE 50 MG: 50 INJECTION, SOLUTION INTRAMUSCULAR; INTRAVENOUS at 13:29

## 2023-10-10 RX ADMIN — HYDRALAZINE HYDROCHLORIDE 25 MG: 25 TABLET, FILM COATED ORAL at 21:44

## 2023-10-10 RX ADMIN — DIPHENHYDRAMINE HCL 50 MG: 25 CAPSULE ORAL at 18:53

## 2023-10-10 RX ADMIN — OXYCODONE HYDROCHLORIDE 5 MG: 5 TABLET ORAL at 20:10

## 2023-10-10 RX ADMIN — FAMOTIDINE 20 MG: 10 INJECTION, SOLUTION INTRAVENOUS at 14:05

## 2023-10-10 RX ADMIN — DIPHENHYDRAMINE HYDROCHLORIDE 50 MG: 50 INJECTION INTRAMUSCULAR; INTRAVENOUS at 13:29

## 2023-10-10 RX ADMIN — HYDRALAZINE HYDROCHLORIDE 12.5 MG: 25 TABLET, FILM COATED ORAL at 14:05

## 2023-10-10 RX ADMIN — POLYETHYLENE GLYCOL 3350 17 G: 17 POWDER, FOR SOLUTION ORAL at 09:08

## 2023-10-10 RX ADMIN — LEVOTHYROXINE SODIUM 50 MCG: 0.03 TABLET ORAL at 08:19

## 2023-10-10 RX ADMIN — LABETALOL HYDROCHLORIDE 300 MG: 200 TABLET, FILM COATED ORAL at 21:44

## 2023-10-10 RX ADMIN — SENNOSIDES AND DOCUSATE SODIUM 1 TABLET: 50; 8.6 TABLET ORAL at 09:08

## 2023-10-10 RX ADMIN — LABETALOL HYDROCHLORIDE 300 MG: 200 TABLET, FILM COATED ORAL at 13:30

## 2023-10-10 RX ADMIN — SENNOSIDES AND DOCUSATE SODIUM 2 TABLET: 50; 8.6 TABLET ORAL at 20:09

## 2023-10-10 RX ADMIN — LABETALOL HYDROCHLORIDE 400 MG: 200 TABLET, FILM COATED ORAL at 07:59

## 2023-10-10 RX ADMIN — DIPHENHYDRAMINE HYDROCHLORIDE 25 MG: 50 INJECTION, SOLUTION INTRAMUSCULAR; INTRAVENOUS at 00:29

## 2023-10-10 RX ADMIN — IBUPROFEN 800 MG: 800 TABLET ORAL at 13:30

## 2023-10-10 RX ADMIN — HYDRALAZINE HYDROCHLORIDE 12.5 MG: 25 TABLET, FILM COATED ORAL at 17:51

## 2023-10-10 RX ADMIN — CETIRIZINE HYDROCHLORIDE 10 MG: 10 TABLET, FILM COATED ORAL at 20:10

## 2023-10-10 RX ADMIN — IBUPROFEN 800 MG: 800 TABLET ORAL at 07:59

## 2023-10-10 RX ADMIN — DIPHENHYDRAMINE HYDROCHLORIDE 50 MG: 25 CAPSULE ORAL at 09:51

## 2023-10-10 RX ADMIN — NIFEDIPINE 60 MG: 30 TABLET, EXTENDED RELEASE ORAL at 09:08

## 2023-10-10 ASSESSMENT — ACTIVITIES OF DAILY LIVING (ADL)
ADLS_ACUITY_SCORE: 18

## 2023-10-10 NOTE — PROGRESS NOTES
Notified by RN of worsening full body rash and swollen lips/face. At bedside within 5 minutes to assess. Oxygenating well and denies SOB. Rash is notably worse than this morning when I rounded. RN also noted 1 beat of clonus on L. BP's have been normal.     Sola states only new medication this hospitalization was PO nifedipine. She has been on labetalol throughout pregnancy and reports she was discharged on labetalol her last pregnancy with no issues. Has not received lovenox since last 10/9 0846.    One time dose of IV benadryl 50mg ordered. Discussed immediately with Dr. Jessica Murrell who ordered one time dose of steroids and famotidine. She is adjusting hypertensive medications. Plan to discontinue Nifedipine and switch to hydralazine. Hospitalist consult ordered.     Dr. Jessica Murrell will be at bedside to assess.     EVELIN Dee CNM on 10/10/2023 at 1:49 PM

## 2023-10-10 NOTE — PROGRESS NOTES
Postpartum Day 5    Patient Name:  Sola Akers  :  1990  MRN:  [id number]      Assessment: POD#5 s/p scheduled  for di-di twin pregnancy.Postpartum course complicated by CHTN w/ superimposed pre-e with elevated postpartum BP's, elevated LFT's (now decreasing 10/10), and whole body rash. S/p abdominal ultrasound 10/9/23.    Plan:  Discussed clinical course with Dr. Jessica Murrell. Adjusting labetalol to 300mg TID. Continue nifedipine ER 60mg daily. Recheck LFT's in AM. Scheduling benadryl for her rash. Continue to hold lovenox, ambulating well. Dr. Jessica Murrell recommends no discharge today as she had severe range BP last evening around 8pm.    Subjective:  The patient feels well:  Voiding without difficulty, lochia normal, tolerating normal diet, ambulating and passing flatus.  Pain is well controlled with current medications.  The patient has no emotional concerns.  The babies are well. Formula feeding.  Reports whole body rash improved after holding lovenox and receiving IV benadryl last evening. Still with rash but reports it is the best it has been. Denies headache/visual changes/RUQ pain. Recommendations were made to continue inpatient status for BP  monitoring and med adjustment- very disappointed was hoping to discharge home today. Has peds appt for twins tomorrow at noon.     Birth date:      Ermias Akers-NICKIE Selby [[id number]]   10/5/2023      Duong Akers [[id number]]   10/5/2023   Birth Time:      Ermias Akers-NICKIE Selby [[id number]]   7:51 AM      Duong Akers [[id number]]   7:54 AM     Prenatal complications:   cHTN, BMI 45, subclinical hypothyroidism, hx , di-di twin pregnancy.       Objective:  /69   Pulse 84   Temp 98.2  F (36.8  C) (Oral)   Resp 18   Ht 1.524 m (5')   Wt 114.6 kg (252 lb 10.4 oz)   LMP 2023   SpO2 98%   Breastfeeding Unknown   BMI 49.34 kg/m    Patient Vitals for the past 24 hrs:   BP Temp Temp src Pulse  Resp SpO2 Weight   10/10/23 0900 128/69 -- -- 84 18 98 % --   10/10/23 0647 -- -- -- -- -- -- 114.6 kg (252 lb 10.4 oz)   10/10/23 0402 131/78 98.2  F (36.8  C) Oral 95 18 95 % --   10/10/23 0002 133/72 99.2  F (37.3  C) Oral 104 20 97 % --   10/09/23 2125 (!) 150/76 -- -- -- -- -- --   10/09/23 2042 (!) 156/87 -- -- -- -- -- --   10/09/23 1958 (!) 168/81 98.7  F (37.1  C) Oral 111 -- 98 % --   10/09/23 1600 (!) 140/73 98.7  F (37.1  C) Oral 103 24 96 % --   10/09/23 1300 131/80 98  F (36.7  C) Oral 98 20 98 % --   10/09/23 1126 (!) 145/83 -- -- -- -- -- --   10/09/23 1125 (!) 153/85 -- -- -- -- -- --   10/09/23 1100 (!) 148/86 -- -- 97 -- -- --   10/09/23 1059 (!) 161/90 -- -- 98 -- -- --       Exam: Patient A&O x 3. No acute distress, breathing unlabored. The amount and color of the lochia is appropriate for the duration of recovery. The incision is healing well.  The patient is ambulating well.  The patient is tolerating a regular diet.    Lab Results   Component Value Date    AS Negative 10/05/2023    HEPBANG Nonreactive 10/09/2023    HGB 10.7 (L) 10/10/2023       Immunization History   Administered Date(s) Administered    COVID-19 MONOVALENT 12+ (Pfizer) 08/20/2021, 09/10/2021    COVID-19 Monovalent 12+ (Pfizer 2022) 02/18/2022    DT (PEDS <7y) 08/09/2002    HPV Quadrivalent 08/31/2007, 02/11/2008, 07/05/2012    HepA, Unspecified 08/31/2007, 07/05/2012    HepB, Unspecified 08/19/2002, 10/01/2002, 05/22/2003    Hib, Unspecified 1990, 03/22/1991, 06/19/1991, 06/15/1992    Historical DTP/aP 1990, 1990, 1990, 06/15/1992, 06/30/1995    Influenza Vaccine >6 months (Alfuria,Fluzone) 10/19/2021, 10/24/2022    Influenza,INJ,MDCK,PF,Quad >6mo(Flucelvax) 09/18/2023    MMR 09/06/1991    Meningococcal ACWY (Menactra ) 08/31/2007    OPV, trivalent, live 1990, 1990, 06/15/1992, 06/30/1995    TDAP (Adacel,Boostrix) 08/31/2007, 09/14/2023    TDAP Vaccine (Adacel) 05/21/2021       Provider:   EVELIN Dee CNM on 10/10/2023 at 9:41 AM      Date:  10/10/2023  Time:  9:38 AM

## 2023-10-10 NOTE — PLAN OF CARE
Patient's vitals and maternal assessments WDL except for widespread rash. Patient reports spreading and itching, IV Benadryl given and relief noted. Blood pressures WDL overnight: 133/72 and 131/78. Denies pre-e symptoms. +2 reflexes with no clonus noted. Pain adequately managed with Ibuprofen and PRN oxycodone. Up independently caring for self and her boarding infants.     Problem: Hypertensive Disorders in Pregnancy  Goal: Maternal-Fetal Stabilization  Outcome: Progressing   Goal Outcome Evaluation:      Plan of Care Reviewed With: patient, spouse    Overall Patient Progress: improvingOverall Patient Progress: improving

## 2023-10-10 NOTE — PROGRESS NOTES
Patient having an allergic response.  The only new medication was the procardia.  Will stop that at this time and change to hydralazine as another agent.  Will need to titrate this.    Giving IV steroids and famotidine at this time.

## 2023-10-10 NOTE — PLAN OF CARE
Goal Outcome Evaluation:      Plan of Care Reviewed With: patient, spouse    Overall Patient Progress: no changeOverall Patient Progress: no change    Sola has had a rash since last night which improved with IV Benadryl. This morning she stated it was much improved and chose to take PO Benadryl at 0951. Shortly before 1330 she called to say the rash had returned. Her face and lips were noted to be swollen. She states she can breathe normally. Susan Roblero CNM notified and orders received. Sola will continue to keep RN aware of any worsening symptoms regarding rash. Also of note was 1 beat of clonus on left. Sola's bleeding, voiding and pain control are WNL.  Will continue to monitor.

## 2023-10-10 NOTE — PLAN OF CARE
Goal Outcome Evaluation: Progressing      Plan of Care Reviewed With: patient    Overall Patient Progress: improvingOverall Patient Progress: improving    Patient is independent in room, her  is at the bedside and is attentive to patient. Infant twins are also at the bedside, rooming in with patient.    She denied pain this shift, was up and ambulating without difficulty. Voiding spontaneously. Blood pressure was 143/83 at 1730. Scheduled hydralazine given per MAR.     Fundus firm, bleeding scant. VSS.    BP (!) 143/83 (BP Location: Left arm, Patient Position: Semi-Stubbs's, Cuff Size: Adult Large)   Pulse 97   Temp 98  F (36.7  C) (Oral)   Resp 18   Ht 1.524 m (5')   Wt 114.6 kg (252 lb 10.4 oz)   LMP 01/18/2023   SpO2 97%   Breastfeeding Unknown   BMI 49.34 kg/m

## 2023-10-10 NOTE — PROVIDER NOTIFICATION
Spoke with Dr FU regarding severe range blood pressure and labs.  Ok to continue to monitor Bps Q4 hours, and hold lovenox tonight (suspect it is causing the rash per MD).   Linda Keene RN  10/9/2023

## 2023-10-11 VITALS
OXYGEN SATURATION: 97 % | SYSTOLIC BLOOD PRESSURE: 138 MMHG | HEART RATE: 97 BPM | TEMPERATURE: 98.8 F | BODY MASS INDEX: 49.26 KG/M2 | DIASTOLIC BLOOD PRESSURE: 75 MMHG | RESPIRATION RATE: 16 BRPM | WEIGHT: 250.9 LBS | HEIGHT: 60 IN

## 2023-10-11 PROBLEM — O10.919 CHRONIC HYPERTENSION AFFECTING PREGNANCY: Status: ACTIVE | Noted: 2023-10-11

## 2023-10-11 PROBLEM — I10 BENIGN ESSENTIAL HYPERTENSION: Status: ACTIVE | Noted: 2023-10-11

## 2023-10-11 PROBLEM — T78.40XA ALLERGIC REACTION: Status: ACTIVE | Noted: 2023-10-11

## 2023-10-11 LAB
ALBUMIN SERPL BCG-MCNC: 3 G/DL (ref 3.5–5.2)
ALP SERPL-CCNC: 134 U/L (ref 35–104)
ALT SERPL W P-5'-P-CCNC: 61 U/L (ref 0–50)
ANION GAP SERPL CALCULATED.3IONS-SCNC: 11 MMOL/L (ref 7–15)
AST SERPL W P-5'-P-CCNC: 29 U/L (ref 0–45)
BILIRUB SERPL-MCNC: 0.2 MG/DL
BUN SERPL-MCNC: 7.8 MG/DL (ref 6–20)
CALCIUM SERPL-MCNC: 8.5 MG/DL (ref 8.6–10)
CHLORIDE SERPL-SCNC: 106 MMOL/L (ref 98–107)
CREAT SERPL-MCNC: 0.72 MG/DL (ref 0.51–0.95)
DEPRECATED HCO3 PLAS-SCNC: 24 MMOL/L (ref 22–29)
EGFRCR SERPLBLD CKD-EPI 2021: >90 ML/MIN/1.73M2
ERYTHROCYTE [DISTWIDTH] IN BLOOD BY AUTOMATED COUNT: 13.2 % (ref 10–15)
GLUCOSE SERPL-MCNC: 116 MG/DL (ref 70–99)
HCT VFR BLD AUTO: 32 % (ref 35–47)
HGB BLD-MCNC: 10.5 G/DL (ref 11.7–15.7)
MCH RBC QN AUTO: 30.6 PG (ref 26.5–33)
MCHC RBC AUTO-ENTMCNC: 32.8 G/DL (ref 31.5–36.5)
MCV RBC AUTO: 93 FL (ref 78–100)
PLATELET # BLD AUTO: 238 10E3/UL (ref 150–450)
POTASSIUM SERPL-SCNC: 4 MMOL/L (ref 3.4–5.3)
PROT SERPL-MCNC: 5.4 G/DL (ref 6.4–8.3)
RBC # BLD AUTO: 3.43 10E6/UL (ref 3.8–5.2)
SODIUM SERPL-SCNC: 141 MMOL/L (ref 135–145)
WBC # BLD AUTO: 7.1 10E3/UL (ref 4–11)

## 2023-10-11 PROCEDURE — 250N000013 HC RX MED GY IP 250 OP 250 PS 637: Performed by: OBSTETRICS & GYNECOLOGY

## 2023-10-11 PROCEDURE — 80053 COMPREHEN METABOLIC PANEL: CPT | Performed by: OBSTETRICS & GYNECOLOGY

## 2023-10-11 PROCEDURE — 250N000012 HC RX MED GY IP 250 OP 636 PS 637: Performed by: INTERNAL MEDICINE

## 2023-10-11 PROCEDURE — 36415 COLL VENOUS BLD VENIPUNCTURE: CPT | Performed by: OBSTETRICS & GYNECOLOGY

## 2023-10-11 PROCEDURE — 85027 COMPLETE CBC AUTOMATED: CPT | Performed by: OBSTETRICS & GYNECOLOGY

## 2023-10-11 PROCEDURE — 250N000013 HC RX MED GY IP 250 OP 250 PS 637: Performed by: ADVANCED PRACTICE MIDWIFE

## 2023-10-11 PROCEDURE — 99223 1ST HOSP IP/OBS HIGH 75: CPT | Performed by: INTERNAL MEDICINE

## 2023-10-11 RX ORDER — PREDNISONE 20 MG/1
20 TABLET ORAL DAILY
Qty: 4 TABLET | Refills: 0 | Status: SHIPPED | OUTPATIENT
Start: 2023-10-12

## 2023-10-11 RX ORDER — PREDNISONE 5 MG/1
20 TABLET ORAL DAILY
Status: DISCONTINUED | OUTPATIENT
Start: 2023-10-12 | End: 2023-10-11 | Stop reason: HOSPADM

## 2023-10-11 RX ORDER — PREDNISONE 20 MG/1
40 TABLET ORAL ONCE
Status: COMPLETED | OUTPATIENT
Start: 2023-10-11 | End: 2023-10-11

## 2023-10-11 RX ORDER — HYDRALAZINE HYDROCHLORIDE 25 MG/1
25 TABLET, FILM COATED ORAL 4 TIMES DAILY
Qty: 90 TABLET | Refills: 0 | Status: SHIPPED | OUTPATIENT
Start: 2023-10-11

## 2023-10-11 RX ORDER — LABETALOL 300 MG/1
300 TABLET, FILM COATED ORAL EVERY 8 HOURS
Qty: 90 TABLET | Refills: 0 | Status: SHIPPED | OUTPATIENT
Start: 2023-10-11

## 2023-10-11 RX ORDER — IBUPROFEN 800 MG/1
800 TABLET, FILM COATED ORAL EVERY 6 HOURS
COMMUNITY
Start: 2023-10-11

## 2023-10-11 RX ADMIN — LABETALOL HYDROCHLORIDE 300 MG: 200 TABLET, FILM COATED ORAL at 06:18

## 2023-10-11 RX ADMIN — PREDNISONE 40 MG: 20 TABLET ORAL at 11:16

## 2023-10-11 RX ADMIN — IBUPROFEN 800 MG: 800 TABLET ORAL at 08:25

## 2023-10-11 RX ADMIN — IBUPROFEN 800 MG: 800 TABLET ORAL at 02:35

## 2023-10-11 RX ADMIN — HYDRALAZINE HYDROCHLORIDE 25 MG: 25 TABLET, FILM COATED ORAL at 08:20

## 2023-10-11 RX ADMIN — DIPHENHYDRAMINE HCL 50 MG: 25 CAPSULE ORAL at 01:05

## 2023-10-11 RX ADMIN — LEVOTHYROXINE SODIUM 50 MCG: 0.03 TABLET ORAL at 08:15

## 2023-10-11 RX ADMIN — DIPHENHYDRAMINE HCL 25 MG: 25 CAPSULE ORAL at 08:21

## 2023-10-11 ASSESSMENT — ACTIVITIES OF DAILY LIVING (ADL)
ADLS_ACUITY_SCORE: 18

## 2023-10-11 NOTE — PROGRESS NOTES
Increased hydralazine to 25 mg in preparation of discharge tomorrow.  She had mildly elevated last blood pressure.

## 2023-10-11 NOTE — PLAN OF CARE
Problem: Plan of Care - These are the overarching goals to be used throughout the patient stay.    Goal: Plan of Care Review  Description: The Plan of Care Review/Shift note should be completed every shift.  The Outcome Evaluation is a brief statement about your assessment that the patient is improving, declining, or no change.  This information will be displayed automatically on your shift note.  Outcome: Met   Goal Outcome Evaluation    Discharge ok'd by hospitalist with first dose Prednisone 40, then 20 x 4 days for allergic reaction. She will also take zyrtec at home.    Discharge orders by Dr Chang and JOSÉ ANTONIO completed with plan for home today. Reviewed warning signs when to call the doctor. Patient reports that she has her own bp cuff at home and knows how to use it. Medication and monitoring bid reviewed with parameters to call. Patient is going home today. She will  her medication at her own pharmacy on the way. Home with spouse and twin daughters now.

## 2023-10-11 NOTE — H&P
[facility]    History and Physical  Hospitalist       Date of Admission:  10/5/2023    Assessment & Plan   33 year old female with twins admitted for  delivery, complicated by Hypertension, and then rash and angioedema probably related to PCN allergy (given IV Cefazolin on 10/5/23).    Summary:    Principal Problem:    Twin Pregnancy -- S/P  section 10/5/23   -- doing well, still on Ibuprofen and Oxycodone for pain      Benign essential hypertension   -- borderline elevated BP on Labetalol 300 mg tid  and Hydralazine 25 mg qid   -- Would consider consider going back on Nifedipine ER 0 mg bid and stop hydralazine, adjust dose as needed       Allergic reaction with Rash, angioedema and bronchospasm-- Suspect related to Cefazolin on 10/5/23   -- better today on Benadryl PRN, Zyrtec and IV solumedrol 40 mg IV yesterday   -- given severity of symptoms a short course of Prednisone (40 mg PO today, then 20 mg for 4 days would seem reasonable)       Plan:  recommendations as above.  Medically stable to discharge from my standpoint -- especially if OK with PO Prednisone as that should help prevent recurrence of concerning allergic symptoms.      DVT Prophylaxis: Pneumatic Compression Devices  Code Status: Full Code    Disposition: Expected discharge possibly today.     Arnold Garcia MD  Pager: [phone]  Cell Phone:  [phone]     Primary Care Physician   Sierra Lyons    Chief Complaint   Rash, facial swelling, throat swelling    History is obtained from Patient    History of Present Illness   33 year old female with hx of HTN after first pregnancy, and family of hypertension, and admitted on 10/5 for  for twins, and did receive Cefazolin 2 gm preoperative, and then developed rash about 3 days ago, and then yesterday face and throat swelling and got IV solumedrol 40 mg and feels better today.  Has know PCN allergy with prior rash, no know allergy to  cephalosporins.      PAST MEDICAL HISTORY    Past Medical History:   Diagnosis Date    Closed dislocation of patella 2021    Formatting of this note might be different from the original. Created by [facility] Annotation: Aug 31 2007  6:22PM - Elvira Christie: arthroscopy     Gestational hypertension     PCOS (polycystic ovarian syndrome)     Pre-eclampsia in postpartum period 2021    Thyroid disease         PAST SURGICAL HISTORY    Past Surgical History:   Procedure Laterality Date    AS KNEE SCOPE, DIAGNOSTIC       SECTION N/A 2021    Procedure:  SECTION;  Surgeon: Elvira Bustos DO;  Location: [facility] OR    COMBINED  SECTION, SALPINGECTOMY BILATERAL Bilateral 10/5/2023    Procedure: REPEAT  SECTION WITH BILATERAL SALPINGECTOMY;  Surgeon: Elvira Bustos DO;  Location: [facility] OR        Prior to Admission Medications   Prior to Admission Medications   Prescriptions Last Dose Informant Patient Reported? Taking?   Prenatal Vit-Fe Fumarate-FA (PRENATAL MULTIVITAMIN W/IRON) 27-0.8 MG tablet 10/4/2023  Yes Yes   Sig: Take 1 tablet by mouth daily   aspirin 81 MG EC tablet 10/4/2023  Yes Yes   Sig: [ASPIRIN 81 MG EC TABLET] Take 81 mg by mouth daily.   calcium-vitamin D (CALCIUM-VITAMIN D) 500 mg(1,250mg) -200 unit per tablet   Yes No   Sig: [CALCIUM-VITAMIN D (CALCIUM-VITAMIN D) 500 MG(1,250MG) -200 UNIT PER TABLET] Take 1 tablet by mouth at bedtime.   cetirizine (ZYRTEC) 10 MG tablet   Yes Yes   Sig: Take 10 mg by mouth daily   ferrous sulfate (FEROSUL) 325 (65 Fe) MG tablet 10/4/2023  Yes Yes   Sig: Take 325 mg by mouth daily (with breakfast)   labetalol (NORMODYNE) 200 MG tablet   No No   Sig: Take 1 tablet (200 mg) by mouth every 12 hours   Patient taking differently: Take 300 mg by mouth 2 times daily   levothyroxine (SYNTHROID/LEVOTHROID) 50 MCG tablet 10/5/2023  Yes Yes   Sig: Take 50 mcg by mouth daily   magnesium 250 MG tablet    Yes Yes   Sig: Take 1 tablet by mouth daily      Facility-Administered Medications: None     Allergies   Allergies   Allergen Reactions    Bee Venom Anaphylaxis    Seafood Anaphylaxis    Kiwi [Kiwi Extract] Hives    Sulfa (Sulfonamide Antibiotics) [Sulfa Antibiotics] Unknown    Venom-Honey Bee [Bee Venom] Unknown    Penicillins Rash    Sulfa Antibiotics Rash       SOCIAL HISTORY    Social History     Social History Narrative    ** Merged History Encounter **           Social History     Tobacco Use    Smoking status: Never    Smokeless tobacco: Never   Substance Use Topics    Alcohol use: Not Currently     Comment: Alcoholic Drinks/day: occ prior to pregnancy    Drug use: Never        FAMILY HISTORY    History reviewed. No pertinent family history.     Review of Systems   The 10 point Review of Systems is negative other than noted in the HPI or here.   Last BM 2 days ago.       PHYSICAL EXAM     Temp: 97.8  F (36.6  C) Temp src: Oral BP: 138/75 Pulse: 95   Resp: 16 SpO2: 96 % O2 Device: None (Room air)    Vital Signs with Ranges  Temp:  [97.8  F (36.6  C)-98.5  F (36.9  C)] 97.8  F (36.6  C)  Pulse:  [88-97] 95  Resp:  [16-18] 16  BP: (132-148)/(72-92) 138/75  SpO2:  [96 %-97 %] 96 %  250 lbs 14.4 oz    Constitutional: Awake, alert, cooperative, no apparent distress.  Eyes: Conjunctiva and pupils examined and normal.  HEENT: Moist mucous membranes, normal dentition.  Respiratory: Clear to auscultation bilaterally, no crackles or wheezing.  Cardiovascular: Regular rate and rhythm, normal S1 and S2, and no murmur noted, no carotid bruits.  No ankle edema.   GI: Soft, non-distended, non-tender, normal bowel sounds.  Lymph/Hematologic: No anterior cervical, supraclavicular or axillary adenopathy.  Skin: No rashes, no cyanosis.   Musculoskeletal: No joint swelling, erythema or tenderness.  Neurologic: Alert, Ox3, Cranial nerves 2-12 intact, no focal weakness or numbness  Psychiatric:  No obvious anxiety or  depression.    Data   Data reviewed today:  I personally reviewed no images or EKG's today.  Recent Labs   Lab 10/11/23  0635 10/10/23  0716 10/09/23  1837 10/09/23  0610 10/08/23  0657 10/07/23  1151   WBC 7.1  --   --  5.8  --  4.8   HGB 10.5* 10.7*  --  10.9*   < > 10.5*   MCV 93  --   --  94  --  92    237  --  246   < > 223     --  134* 139  --  137   POTASSIUM 4.0  --  3.9 4.1  --  4.2   CHLORIDE 106  --  102 104  --  106   CO2 24  --  23 24  --  22   BUN 7.8  --  4.9* 4.3*  --  6.5   CR 0.72 0.72 0.67 0.66   < > 0.74   ANIONGAP 11  --  9 11  --  9   ARLEEN 8.5*  --  8.9 8.1*  --  8.5*   *  --  117* 79  --  94   ALBUMIN 3.0*  --  3.0* 3.0*  --  3.1*   PROTTOTAL 5.4*  --  5.5* 5.4*  --  5.4*   BILITOTAL 0.2  --  0.2 0.2  --  0.2   ALKPHOS 134*  --  148* 141*  --  130*   ALT 61* 77* 91* 92*   < > 21   AST 29 50* 71* 87*   < > 28    < > = values in this interval not displayed.       Imaging:  No results found for this or any previous visit (from the past 24 hour(s)).

## 2023-10-11 NOTE — PROGRESS NOTES
Notified Susan Roblero CNM of recent elevated blood pressures. 148/92 at 2104 and 140/89 at 2221. Instructed to call Dr. Murrell if elevated above 140/90 at next check, 0230.

## 2023-10-11 NOTE — PROGRESS NOTES
Postpartum Day 6    Patient Name:  Sola Akers  :  1990  MRN:  [id number]    Subjective:  The patient feels well:  Voiding without difficulty, lochia normal, tolerating normal diet, ambulating and passing flatus.  Pain is well controlled with current medications.  The patient has no emotional concerns.  The baby is well. Has pediatrician appointment today at noon and would like to make this appointment. She lives nearby and has a blood pressure cuff at home.     Objective:  /75 (BP Location: Left arm, Patient Position: Semi-Stubbs's, Cuff Size: Adult Large)   Pulse 97   Temp 98.8  F (37.1  C) (Axillary)   Resp 16   Ht 1.524 m (5')   Wt 113.8 kg (250 lb 14.4 oz)   LMP 2023   SpO2 97%   Breastfeeding Unknown   BMI 49.00 kg/m    Patient Vitals for the past 24 hrs:   BP Temp Temp src Pulse Resp SpO2 Weight   10/11/23 0820 138/75 98.8  F (37.1  C) Axillary 97 -- 97 % --   10/11/23 0615 (!) 145/72 -- -- 95 -- -- 113.8 kg (250 lb 14.4 oz)   10/11/23 0237 132/79 97.8  F (36.6  C) Oral 93 16 96 % --   10/10/23 2221 (!) 140/89 -- -- -- -- -- --   10/10/23 2104 (!) 148/92 98.5  F (36.9  C) Oral 95 17 -- --   10/10/23 1730 (!) 143/83 98  F (36.7  C) Oral 97 18 97 % --       Exam: Patient A&O x 3. No acute distress, breathing unlabored. The amount and color of the lochia is appropriate for the duration of recovery.  The incision is healing well.  The patient is ambulating well.  The patient is tolerating a regular diet.    Lab Results   Component Value Date    AS Negative 10/05/2023    HEPBANG Nonreactive 10/09/2023    HGB 10.5 (L) 10/11/2023       Immunization History   Administered Date(s) Administered    COVID-19 MONOVALENT 12+ (Pfizer) 2021, 09/10/2021    COVID-19 Monovalent 12+ (Pfizer ) 2022    DT (PEDS <7y) 2002    HPV Quadrivalent 2007, 2008, 2012    HepA, Unspecified 2007, 2012    HepB, Unspecified 2002, 10/01/2002,  05/22/2003    Hib, Unspecified 1990, 03/22/1991, 06/19/1991, 06/15/1992    Historical DTP/aP 1990, 1990, 1990, 06/15/1992, 06/30/1995    Influenza Vaccine >6 months (Alfuria,Fluzone) 10/19/2021, 10/24/2022    Influenza,INJ,MDCK,PF,Quad >6mo(Flucelvax) 09/18/2023    MMR 09/06/1991    Meningococcal ACWY (Menactra ) 08/31/2007    OPV, trivalent, live 1990, 1990, 06/15/1992, 06/30/1995    TDAP (Adacel,Boostrix) 08/31/2007, 09/14/2023    TDAP Vaccine (Adacel) 05/21/2021     Assessment:   1) Allergic reaction   -Discussed with hospitalist. Plan to give prednisone 40mg today, then four day course of 20mg at home. Discussed with patient who is in agreeemtn.   -Hospitalist also feels reaction was more likely secondary to a cephalosporin cross reactivity than Nifedipine, and Nifedipine may be a better choice for blood pressure control. I discussed this with the patient and she prefers to stay on hydralazine to reduce the risk of returning to hospital. BP this morning appropriate so will stay on this regimen    2) CHTN w/ superimposed pre-e   -Will discharge home today on labetalol 300mg TID and hydralazine QID. Reviewed signs and symptoms of preeclampsia with patient and she voiced understanding. Will have a BP follow up in 3 days with the clinic and continue to monitor her pressures at home.      Provider:  Ruma Chang,     Date:  10/11/2023  Time:  4:05 PM

## 2023-10-11 NOTE — CONSULTS
Integrative Therapy Consult    Healing PresenceYes  Essential Oils: Topical (EO/Topical Oil)     Lavender - ABDIAS, Qi -  HC       Healing Music:       Breathwork:       Guided Imagery:       Acupressure:       Oshibori:       Energy Therapy:       Healing Touch:       Reiki:       Qi Gong:     Massage: Foot, Targeted massage      Targeted Massage: Legs (MLD - lower legs)  Sleep Promotion:       Other Therapy:       Intervention Reason: Edema     Pre and Post Session Scores: Patient Desires Treatment: yes                             Delivery:         Referrals:      Agnieszka Lynn

## 2023-10-11 NOTE — PROGRESS NOTES
[facility] RN Care Coordinator Note    Sola Akers  [id number]  1990    Chart reviewed, discharge follow-up plan discussed with patient's care team, needs assessed. Patient requests all follow-up through clinic, declines home care visit. Post-delivery follow-up appointment with [facility].     Patient states she has support at home and would like to discharge today with  twin daughters (boarding discharge 10/9/23). She states she has a blood pressure cuff at home. RN Care Coordinator will continue to follow and assist if needed with discharge plan. Olga Lidia Lundberg RN on 10/11/2023 at 12:31 PM

## 2023-10-11 NOTE — PLAN OF CARE
Sola's VSS. See blood pressure  progress note. Reflexes normal, no clonus. Denies preeclampsia symptoms. Pain well controlled with scheduled tylenol and ibuprofen. Fundal assessment and bleeding WNL. Incision WDL, well-approximated with no new drainage. Continues to have rash, however it has not gotten any worse. Up and independent. She decided overnight that she did not want to pump any longer and wanted to feed infants formula. Her and  are attentive to infants.    Problem: Plan of Care - These are the overarching goals to be used throughout the patient stay.    Goal: Plan of Care Review  Description: The Plan of Care Review/Shift note should be completed every shift.  The Outcome Evaluation is a brief statement about your assessment that the patient is improving, declining, or no change.  This information will be displayed automatically on your shift note.  Outcome: Progressing  Goal: Absence of Hospital-Acquired Illness or Injury  Outcome: Progressing  Intervention: Prevent Skin Injury  Recent Flowsheet Documentation  Taken 10/11/2023 0237 by Dolores Peralta, RN  Body Position: position changed independently  Goal: Optimal Comfort and Wellbeing  Outcome: Progressing  Intervention: Provide Person-Centered Care  Recent Flowsheet Documentation  Taken 10/11/2023 0237 by Dolores Peralta, RN  Trust Relationship/Rapport:   care explained   choices provided   questions encouraged   questions answered   thoughts/feelings acknowledged  Goal: Readiness for Transition of Care  Outcome: Progressing     Problem: Postpartum ( Delivery)  Goal: Successful Maternal Role Transition  Outcome: Progressing  Goal: Hemostasis  Outcome: Progressing  Goal: Fluid and Electrolyte Balance  Outcome: Progressing  Goal: Absence of Infection Signs and Symptoms  Outcome: Progressing  Goal: Optimal Pain Control and Function  Outcome: Progressing  Intervention: Prevent or Manage Pain  Recent Flowsheet Documentation  Taken 10/11/2023  0237 by Dolores Peralta, RN  Perineal Care:   absorbent brief/pad changed   perineum cleansed

## 2023-10-12 NOTE — DISCHARGE SUMMARY
[facility]    Discharge Summary  Obstetrics    Date of Admission:  10/5/2023  Date of Discharge:  10/11/2023 11:59 AM  Discharging Provider: Ruma Chang MD    Discharge Diagnoses   Repeat  section  CHTN with superimposed preeclampsia    Procedure/Surgery Information   Procedure: Procedure(s):  REPEAT  SECTION WITH BILATERAL SALPINGECTOMY   Surgeon(s): Surgeon(s) and Role:     * Elvira Bustos, DO - Primary     History of Present Illness   Sola Akers is a 33 year old female who presented for scheduled RLTCS with Rocio twins, CHTN.     Hospital Course   The patient's hospital course was complicated by development of superimposed preeclampsia. She required titration of blood pressure medications, and developed a rash secondary to either a cephalosporin allergy or the nifedipine. She was placed on prednisone and blood pressure medications were changed to hydralazine and labetalol.  She recovered from the surgery as anticipated and experienced no post-operative complications. On discharge, her pain was well controlled. Vaginal bleeding is similar to peak menstrual flow.  Voiding without difficulty.  Ambulating well and tolerating a normal diet.  No fever or significant wound drainage.  Infants are stable.  She was discharged on post-partum day #6 on labetalol 300mg TID, Hydralazine 25mg QID. Also sent with a four day course of prednisone. She is to follow up in three days for a blood pressure check.     Post-partum hemoglobin:   Hemoglobin   Date Value Ref Range Status   10/11/2023 10.5 (L) 11.7 - 15.7 g/dL Final       [place] Depression Scale  Thoughts of Harming Self: Never  Total Score: 4      Ruma Chang MD    Discharge Disposition   Discharged to home   Condition at discharge: Stable    Pending Results   Final pathology results: No pathology submitted    Unresulted Labs Ordered in the Past 30 Days of this Admission       No orders found from 2023 to  10/6/2023.            Primary Care Physician   Sierra Lyons    Consultations This Hospital Stay   HOSPITALIST IP CONSULT  LACTATION IP CONSULT    Discharge Orders      Discharge Instructions    Call Dr. Drew if any medical questions at Cell Phone [phone].     Activity    Activity as tolerated     Reason for your hospital stay    Maternity care     Follow Up    Follow up with provider in 2 weeks and 6 weeks for post-delivery checks     Severe preeclampsia follow up    Follow up in clinic or with home care within 3 days for blood pressure check     After your  Section    Check your incision each day for signs of infection: redness, drainage, warmth, or discomfort. Contact your provider if you have any of these signs or heavy vaginal bleeding.     Do NOT lift anything more than the baby or what you can lift with one hand such as a jug of milk. Avoid lifting with 2 hands and putting strain on your incision.     Check with your provider before taking tub baths.     It is OK to take showers, you may clean your incision with Hibiclens or soapy water and pat it dry. If you have white tapes (steri-strips) leave them on until they fall off.     You may start driving a car two weeks after delivery. You need to be off narcotic pain meds, be able to turn around and back-up the car, and slam on the brakes safely.     Gradually increase your physical activity and exercise level according to how comfortable or tired you feel. Walking and stairs are ok.     You should have an appointment with your surgeon in 2 weeks for an incision check     Postpartum Anemia    Helpful tips to increase the iron levels in your blood:     Take prenatal vitamins as recommended and extra iron supplement which you can buy over the counter (ferrous sulfate, ferrous gluconate, Slow Fe or slow release iron, Floradix, or liquid chlorophyll)     Consume a vitamin C source with supplement to improve absorption (citrus fruits, orange  juice, potatoes, tomatoes, broccoli, green leafy vegetables, strawberries, or 500 mg table of vitamin C)     Avoid taking in dairy products or an antacid near the same time as you take your iron supplement     If stomach upset occurs, try taking supplement immediately after a meal instead of on an empty stomach or take it less frequently, just don't stop taking it completely.     If constipation occurs take a stool softener or fiber supplement daily.     Breast Pump DME    Breast Pump Documentation:   Manual/Electric Pump: To support adequate breast milk production and nutrition for infant.     I, the undersigned, certify that the above prescribed supplies are medically necessary for this patient and is both reasonable and necessary in reference to accepted standards of medical and necessary in reference to accepted standards of medical practice in the treatment of this patient's condition and is not prescribed as a convenience.     Breast Pump Order    Breast Pump Documentation:   Hospital Grade Pump: TWINS    I, the undersigned, certify that the above prescribed supplies are medically necessary for this patient and is both reasonable and necessary in reference to accepted standards of medical and necessary in reference to accepted standards of medical practice in the treatment of this patient's condition and is not prescribed as a convenience.     Home Blood Pressure Cuff Order for DME    DME Documentation:   Describe the reason for need to support medical necessity: hypertension.    I, the undersigned, certify that the above prescribed supplies are medically necessary for this patient and is both reasonable and necessary in reference to accepted standards of medical and necessary in reference to accepted standards of medical practice in the treatment of this patient's condition and is not prescribed as a convenience.     Diet    Resume previous diet     Discharge Medications   Discharge Medication List as of  10/11/2023 11:42 AM        START taking these medications    Details   hydrALAZINE (APRESOLINE) 25 MG tablet Take 1 tablet (25 mg) by mouth 4 times daily, Disp-90 tablet, R-0, E-Prescribe      ibuprofen (ADVIL/MOTRIN) 800 MG tablet Take 1 tablet (800 mg) by mouth every 6 hours, OTC      predniSONE (DELTASONE) 20 MG tablet Take 1 tablet (20 mg) by mouth daily, Disp-4 tablet, R-0, E-Prescribe           CONTINUE these medications which have CHANGED    Details   labetalol (NORMODYNE) 300 MG tablet Take 1 tablet (300 mg) by mouth every 8 hours, Disp-90 tablet, R-0, E-Prescribe           CONTINUE these medications which have NOT CHANGED    Details   cetirizine (ZYRTEC) 10 MG tablet Take 10 mg by mouth daily, Historical      ferrous sulfate (FEROSUL) 325 (65 Fe) MG tablet Take 325 mg by mouth daily (with breakfast), Historical      levothyroxine (SYNTHROID/LEVOTHROID) 50 MCG tablet Take 50 mcg by mouth daily, Historical      magnesium 250 MG tablet Take 1 tablet by mouth daily, Historical      Prenatal Vit-Fe Fumarate-FA (PRENATAL MULTIVITAMIN W/IRON) 27-0.8 MG tablet Take 1 tablet by mouth daily, Historical           STOP taking these medications       aspirin 81 MG EC tablet Comments:   Reason for Stopping:         calcium-vitamin D (CALCIUM-VITAMIN D) 500 mg(1,250mg) -200 unit per tablet Comments:   Reason for Stopping:             Allergies   Allergies   Allergen Reactions    Bee Venom Anaphylaxis    Seafood Anaphylaxis    Kiwi [Kiwi Extract] Hives    Sulfa (Sulfonamide Antibiotics) [Sulfa Antibiotics] Unknown    Venom-Honey Bee [Bee Venom] Unknown    Penicillins Rash    Sulfa Antibiotics Rash

## 2023-10-20 ENCOUNTER — HOSPITAL ENCOUNTER (EMERGENCY)
Facility: HOSPITAL | Age: 33
Discharge: HOME | End: 2023-10-20
Attending: EMERGENCY MEDICINE | Admitting: EMERGENCY MEDICINE
Payer: COMMERCIAL

## 2023-10-20 VITALS
DIASTOLIC BLOOD PRESSURE: 79 MMHG | SYSTOLIC BLOOD PRESSURE: 142 MMHG | HEART RATE: 95 BPM | TEMPERATURE: 98.4 F | RESPIRATION RATE: 16 BRPM | WEIGHT: 242 LBS | BODY MASS INDEX: 47.51 KG/M2 | HEIGHT: 60 IN | OXYGEN SATURATION: 97 %

## 2023-10-20 DIAGNOSIS — K64.4 EXTERNAL HEMORRHOIDS: ICD-10-CM

## 2023-10-20 PROCEDURE — 99283 EMERGENCY DEPT VISIT LOW MDM: CPT

## 2023-10-20 RX ORDER — HYDROCORTISONE 25 MG/G
CREAM TOPICAL 2 TIMES DAILY PRN
Qty: 30 G | Refills: 0 | Status: SHIPPED | OUTPATIENT
Start: 2023-10-20

## 2023-10-20 NOTE — ED NOTES
I am seeing this patient along with TITO Rae. I had a face to face encounter with this patient seen by the Advanced Practice Provider (TITO).  I have seen, examined, and discussed the patient with the TITO and agree with their assessment and plan of management. I personally saw the patient and performed a substantive portion of the visit including all aspects of the medical decision making.    HPI: 33-year-old female with painful hemorrhoids for 1 day, recently postpartum    Physical Exam: Nontoxic-appearing, rectal exam per TITO      LABS  Pertinent lab results reviewed in chart.  Labs Ordered and Resulted from Time of ED Arrival to Time of ED Departure - No data to display    EKG      RADIOLOGY  No orders to display       PROCEDURES       ED COURSE & MEDICAL DECISION MAKING    Pertinent Labs and Imagaing reviewed (see chart for details)    33 year old female here for evaluation of painful hemorrhoids.  Exam revealed tender external hemorrhoids, no signs of perianal abscess.  Will treat with sits baths, Anusol, stool softeners and outpatient follow-up.    At the conclusion of the encounter I discussed  the results of all of the tests and the disposition.   The questions were answered.  The patient or family acknowledged understanding and was agreeable with the care plan.       FINAL IMPRESSION      1. External hemorrhoids            CRITICAL CARE  0 Minutes    Jo-Ann Lomax MD  10/20/2023 3:05 PM       Jo-Ann Lomax MD  10/20/23 7949

## 2023-10-20 NOTE — DISCHARGE INSTRUCTIONS
You were seen in the emergency department for external hemorrhoids.  You are not having any rectal bleeding, abdominal pain, lightheadedness or dizziness.    Continuing using over-the-counter methods such as Tucks pads or Preparation H.  Also recommending sitz bath's which will help sooth and with pain. continue with ibuprofen as needed and stool softeners. I will be prescribing a topical steroid that you can use 2 times daily.     Please follow-up with your primary care provider in 1 week for reevaluation, and who can put in a referral to GI if this problem persists.    Please return to the emergency department if you are having excessive rectal bleeding, fevers, chills, nausea, vomiting, chest pain, shortness of breath or any other concerning symptoms.

## 2023-10-20 NOTE — ED PROVIDER NOTES
Emergency Department Encounter   NAME: Sola Akers ; AGE: 33 year old female ; YOB: 1990 ; MRN: [id number] ; PCP: Sierra Lyons   ED PROVIDER: Yana Manjarrez PA-C    Evaluation Date & Time:   No admission date for patient encounter.    CHIEF COMPLAINT:  Hemorrhoids      Impression and Plan   Medical Decision Making  Sola Akers is a 33 year old female who presents for evaluation of external hemorrhoids.  Has a history of external hemorrhoids that were managed outpatient with Preparation H.  States she has never had hemorrhoids this bad in the past.     On my initial evaluation, vital signs show blood pressure 150/80 but otherwise unremarkable. On physical exam patient has several large external hemorrhoids. No rectal bleeding. No abdominal tenderness to palpation.      Differential diagnosis includes internal versus external hemorrhoids, anal fissures, among a few.  Visible external hemorrhoids were seen on physical exam which would explain the pain with defecation.  Patient is not having any bloody stools or bright red blood per rectum.  No lightheadedness, dizziness, abdominal pain, nausea, vomiting, diarrhea, constipation. patient's main concern is something to help relieve / resolve the hemorrhoids because the pain is making it difficult to function in her day-to-day life.  Will prescribe hydrocortisone cream and recommend sitz bath's, Tucks pads, ibuprofen, and recommended patient follow-up outpatient with her OB/GYN.    patient was discharged in stable condition with treatment plan as below. Instructed to follow up with primary care provider in 1 week for reevaluation and return to the emergency department with any new or worsening of symptoms including rectal bleeding, abdominal pain, fevers, chills, nausea, vomiting, or any other concerning symptoms.. Patient expressed understanding, feels comfortable, and is in agreement with this plan. All questions addressed prior to  discharge.    History:  Supplemental history from: Documented in chart, if applicable and N/A  External Record(s) reviewed: Documented in chart, if applicable. and Outpatient Record: N/A    Work Up:  Chart documentation includes differential considered and any EKGs or imaging independently interpreted by provider, where specified.  In additional to work up documented, I considered the following work up: N/A    External consultation:  Discussion of management with another provider: N/A    Complicating factors:  Care impacted by chronic illness: N/A  Care affected by social determinants of health: N/A    Disposition considerations: Discharge. I prescribed additional prescription strength medication(s) as charted. N/A.      ED COURSE:  1440 I met and introduced myself to the patient. I gathered initial history and performed my physical exam. We discussed plan for initial workup.   1445 I have staffed the patient with Dr. Jo-Ann Lomax, ED MD, who has evaluated the patient and agrees with all aspects of today's care.     ED Course as of 10/20/23 1514   Fri Oct 20, 2023   1511 To the patient regarding treatment for external hemorrhoids.  Prescribing steroid cream.  Recommended following up outpatient.  Patient understands and agrees with plan.      FINAL IMPRESSION:  No diagnosis found.    MEDICATIONS GIVEN IN THE EMERGENCY DEPARTMENT:  Medications - No data to display    NEW PRESCRIPTIONS STARTED AT TODAY'S ED VISIT:  New Prescriptions    No medications on file       HPI   Patient information was obtained from: patient   Use of Intrepreter: N/A     Sola Akers is a 33 year old female with a pertinent history of uncomplicated hemorrhoids that are usually managed with outpatient conservative therapy who presents to the ED by foot for evaluation of worsening pain with defecation. She is 2 weeks post  presenting with a flareup of hemorrhoids since yesterday when having a BM in the morning. But has not had a BM  since due to the pain.   Has had hemorrhoids in the past but never this bad.  No history of any surgical intervention.  Normally she can use Preparation H and gets relief but that is not working currently.  Is taking ibuprofen with no relief.  Is on a stool softener but no other fiber or laxatives.  She is eating and drinking okay. .    Denies any bright red blood per rectum, fevers, chills, nausea, vomiting, diarrhea, constipation, lightheadedness, dizziness, or any other symptoms other than debilitating pain that is preventing her from being able to sit for comfortable or take care of her  twins.    Medical History     Past Medical History:   Diagnosis Date    Closed dislocation of patella 2021    Gestational hypertension     PCOS (polycystic ovarian syndrome)     Pre-eclampsia in postpartum period 2021    Thyroid disease        Past Surgical History:   Procedure Laterality Date    AS KNEE SCOPE, DIAGNOSTIC       SECTION N/A 2021    Procedure:  SECTION;  Surgeon: Elvira Bustos DO;  Location: [facility] OR    COMBINED  SECTION, SALPINGECTOMY BILATERAL Bilateral 10/5/2023    Procedure: REPEAT  SECTION WITH BILATERAL SALPINGECTOMY;  Surgeon: Elvira Bustos DO;  Location: [facility] OR       No family history on file.    Social History     Tobacco Use    Smoking status: Never    Smokeless tobacco: Never   Substance Use Topics    Alcohol use: Not Currently     Comment: Alcoholic Drinks/day: occ prior to pregnancy    Drug use: Never       cetirizine (ZYRTEC) 10 MG tablet  ferrous sulfate (FEROSUL) 325 (65 Fe) MG tablet  hydrALAZINE (APRESOLINE) 25 MG tablet  ibuprofen (ADVIL/MOTRIN) 800 MG tablet  labetalol (NORMODYNE) 300 MG tablet  levothyroxine (SYNTHROID/LEVOTHROID) 50 MCG tablet  magnesium 250 MG tablet  predniSONE (DELTASONE) 20 MG tablet  Prenatal Vit-Fe Fumarate-FA (PRENATAL MULTIVITAMIN W/IRON) 27-0.8 MG tablet          Physical Exam      First Vitals:  Patient Vitals for the past 24 hrs:   BP Temp Pulse Resp SpO2 Height Weight   10/20/23 1426 (!) 150/80 98.4  F (36.9  C) 100 20 97 % 1.524 m (5') 109.8 kg (242 lb)         PHYSICAL EXAM:   Physical Exam  Constitutional:       General: She is not in acute distress.     Appearance: Normal appearance. She is not ill-appearing.   HENT:      Head: Normocephalic and atraumatic.   Cardiovascular:      Rate and Rhythm: Normal rate and regular rhythm.   Pulmonary:      Effort: Pulmonary effort is normal.      Breath sounds: Normal breath sounds.   Abdominal:      General: Abdomen is flat. There is no distension.      Palpations: Abdomen is soft.      Tenderness: There is no abdominal tenderness.   Genitourinary:     Comments: Several external hemorrhoids without bleeding  Neurological:      Mental Status: She is alert.         Results     LAB:  All pertinent labs reviewed and interpreted  Labs Ordered and Resulted from Time of ED Arrival to Time of ED Departure - No data to display    RADIOLOGY:  No orders to display     Yana Manjarrez PA-C  Emergency Medicine   [facility] EMERGENCY DEPARTMENT      Yana Manjarrez PA-C  10/20/23 0061

## 2023-10-20 NOTE — ED TRIAGE NOTES
Patient 2 weeks post c/s for twins coming to ER with hemorrhoids that are painful and bleeding. Is having stools, last yesterday. Does have hx of HTN, is taking labetalol three times a day. Not nursing          Attending Attestation (For Attendings USE Only)...

## 2023-10-21 ENCOUNTER — APPOINTMENT (OUTPATIENT)
Dept: RADIOLOGY | Facility: HOSPITAL | Age: 33
End: 2023-10-21
Attending: EMERGENCY MEDICINE
Payer: COMMERCIAL

## 2023-10-21 ENCOUNTER — HOSPITAL ENCOUNTER (EMERGENCY)
Facility: HOSPITAL | Age: 33
Discharge: HOME | End: 2023-10-21
Attending: EMERGENCY MEDICINE | Admitting: EMERGENCY MEDICINE
Payer: COMMERCIAL

## 2023-10-21 VITALS
RESPIRATION RATE: 16 BRPM | DIASTOLIC BLOOD PRESSURE: 79 MMHG | HEART RATE: 94 BPM | SYSTOLIC BLOOD PRESSURE: 134 MMHG | TEMPERATURE: 98.5 F | OXYGEN SATURATION: 96 %

## 2023-10-21 DIAGNOSIS — K64.4 EXTERNAL HEMORRHOIDS: ICD-10-CM

## 2023-10-21 DIAGNOSIS — J39.2 THROAT IRRITATION: ICD-10-CM

## 2023-10-21 LAB
FLUAV RNA SPEC QL NAA+PROBE: NEGATIVE
FLUBV RNA RESP QL NAA+PROBE: NEGATIVE
HOLD SPECIMEN: NORMAL
RSV RNA SPEC NAA+PROBE: NEGATIVE
SARS-COV-2 RNA RESP QL NAA+PROBE: NEGATIVE

## 2023-10-21 PROCEDURE — 87637 SARSCOV2&INF A&B&RSV AMP PRB: CPT | Performed by: EMERGENCY MEDICINE

## 2023-10-21 PROCEDURE — 70360 X-RAY EXAM OF NECK: CPT

## 2023-10-21 PROCEDURE — 96374 THER/PROPH/DIAG INJ IV PUSH: CPT

## 2023-10-21 PROCEDURE — 99284 EMERGENCY DEPT VISIT MOD MDM: CPT | Mod: 25

## 2023-10-21 PROCEDURE — 250N000011 HC RX IP 250 OP 636: Performed by: EMERGENCY MEDICINE

## 2023-10-21 RX ORDER — OXYCODONE HYDROCHLORIDE 5 MG/1
5 TABLET ORAL EVERY 6 HOURS PRN
Qty: 12 TABLET | Refills: 0 | Status: SHIPPED | OUTPATIENT
Start: 2023-10-21 | End: 2023-10-24

## 2023-10-21 RX ORDER — MORPHINE SULFATE 10 MG/ML
6 INJECTION, SOLUTION INTRAMUSCULAR; INTRAVENOUS ONCE
Status: COMPLETED | OUTPATIENT
Start: 2023-10-21 | End: 2023-10-21

## 2023-10-21 RX ADMIN — MORPHINE SULFATE 6 MG: 10 INJECTION, SOLUTION INTRAMUSCULAR; INTRAVENOUS at 06:32

## 2023-10-21 ASSESSMENT — ACTIVITIES OF DAILY LIVING (ADL): ADLS_ACUITY_SCORE: 35

## 2023-10-21 ASSESSMENT — ENCOUNTER SYMPTOMS: SORE THROAT: 1

## 2023-10-21 NOTE — ED PROVIDER NOTES
Emergency Department Encounter     Evaluation Date & Time:   10/21/2023  5:45 AM    CHIEF COMPLAINT:  Medication Reaction and Postpartum Complications      Triage Note:The patient reports that she was seen in the er yesterday for hemmeroid pain. She was given a steroid cream. The steroid did not help. She reports pain and swelling in her throat tonight and reports she cannot swallow. She denies difficulty breathing. She is hypertensive in triage and is 2 weeks post .             FINAL IMPRESSION:    ICD-10-CM    1. External hemorrhoids  K64.4       2. Throat irritation  J39.2           Impression and Plan     ED COURSE & MEDICAL DECISION MAKIN:12 AM I met with the patient.   8:24 AM I reviewed her chart from yesterday.      ED Course as of 10/21/23 0824   Sat Oct 21, 2023   0626 Sola unfortunately is here with a feeling of discomfort and swelling in her throat, and with severe hemorrhoids with ongoing pain.  She does note that in the past she has had problems with feeling of throat swelling when her immune system is run down and there was never an etiology found.  She also was just recently in the hospital for preeclampsia with resultant  by [facility] Dr. Bustos, and over the last couple of days just has developed severe hemorrhoids with pain.  She was actually seen yesterday for this and they prescribed steroid cream, and advised ongoing care of external hemorrhoids with Tucks pads and sitz bath's.  She did do the sitz bath last night and started the steroid cream.      For the throat swelling, she did have an episode of anaphylaxis after the  of unclear etiology but at this time her blood pressure is not below, she has no urticaria or hives, no feeling of shortness of breath, just a general fullness feeling in her throat so I am going to do a COVID swab to look for signs of infection, and a soft tissue neck x-ray as on my examination there is no stridor or  swelling of her tonsils posterior throat to suggest any measurable edema.  She is tolerating her secretions well, and her neck is supple without findings of stiffness or needing to hold her head in a sniffing position that would otherwise suggest as severe deep pharyngeal infection or swelling.  Also there is no pain with tracheal movement.       0630 For her hemorrhoids, there is no evidence for surrounding erythema.  She has circumferential severe hemorrhoids that only 1 of which may have a small area of thrombosis.  These are not appropriate for ER drainage given the circumferential extent of them and the lack of true findings of any sort of thrombosis or infection.  However, they obviously do appear to be severely painful and she does need to be able to function and care for her children.  So, I am going to discuss the case with her OB/GYN as they do see these quite frequently to see if they have a particular approach to them.  Given her history of anaphylaxis after surgery more conservative approach would be preferable depending on her level of being able to tolerate.  We will give her a pain medication for now.  No specific x-rays would be helpful at this time for this problem.   0631 When other issues she did have preeclampsia with her labor and delivery and initially was hypertensive on arrival here which was prior to my start of shift but her blood pressures have come down since she has been lying on her side in bed and more comfortable so likely mostly were pain related.   0734 Her throat looks normal on her x-ray as well.  She has been comfortable and here in bed without any progression.  She is reliable and does have access to phone should her symptoms worsen.  She does feel comfortable going home.  There is no evidence of anaphylaxis.  I discussed her results.  I discussed with the on-call physician for OB/GYN and they confirm that really there is no other treatment that they recommend besides what she  was already given yesterday.  Certainly colon and rectal whom they do work with frequently for postpartum patients with hemorrhoids generally does not want to see them for a number of weeks unless there is an obvious complication because they do also want the home treatments to be given a chance to work likely due to possible complications from the surgery.  This was all discussed with the patient.  She is indicating understanding.  She is comfortable going home.  Her mother is here to drive her home.  We will give short course of oxycodone over the weekend for her pain and again we discussed the constipation that can happen with that that can worsen the hemorrhoids so which will make sure to take MiraLAX with it and use it sparingly prefer instead to use primarily ibuprofen.  She is not breast-feeding       At the conclusion of the encounter I discussed the results of all the tests and the disposition. The questions were answered. The patient or family acknowledged understanding and was agreeable with the care plan.          0 minutes of critical care time        MEDICATIONS GIVEN IN THE EMERGENCY DEPARTMENT:  Medications   Morphine Sulfate (PF) injection 6 mg (6 mg Intravenous $Given 10/21/23 0632)       NEW PRESCRIPTIONS STARTED AT TODAY'S ED VISIT:  Discharge Medication List as of 10/21/2023  7:42 AM        START taking these medications    Details   oxyCODONE (ROXICODONE) 5 MG tablet Take 1 tablet (5 mg) by mouth every 6 hours as needed for pain, Disp-12 tablet, R-0, Local Print             HPI     HPI     Sola Akers is a 33 year old female with a pertinent history of HTN who presents to this ED by walk-in for evaluation of medication reaction and hemorrhage pain.    The patient reports hemorrhage flare up and sore throat for a couple of days now. She mentioned she was seen in the ED yesterday for the hemmeroid pain and was given steroid cream with no relief. The patient cannot sit comfortably or walk  around and do anything due to the pain. She endorses the pain all night long. The patient states she has hemorrhages before but not to this extent. She took some of her prescribed oxycodone for her pain last night. No other medication taken and the patient is allergic to penicillin.The patient also mentioned her air ways are swollen causing her to endorse a sore throat. She thinks the sore throat could be due to her allergy and immune system acting up. The patient also think her symptoms could be due to a medication reaction she was given for her . She is 2 weeks into her post . Patient had her post-opp appointment last week and was told everything looked good. She has 2 new born at home and is concerned about not being able to care for them. Denies any fever. No other complaints right now.     REVIEW OF SYSTEMS:  Review of Systems   HENT:  Positive for sore throat.    All other systems reviewed and are negative.    remainder of systems are all otherwise negative.        Medical History     Past Medical History:   Diagnosis Date    Closed dislocation of patella 2021    Gestational hypertension     PCOS (polycystic ovarian syndrome)     Pre-eclampsia in postpartum period 2021    Thyroid disease        Past Surgical History:   Procedure Laterality Date    AS KNEE SCOPE, DIAGNOSTIC       SECTION N/A 2021    Procedure:  SECTION;  Surgeon: Elvira Bustos DO;  Location: [facility] OR    COMBINED  SECTION, SALPINGECTOMY BILATERAL Bilateral 10/5/2023    Procedure: REPEAT  SECTION WITH BILATERAL SALPINGECTOMY;  Surgeon: Elvira Bustos DO;  Location: [facility] OR       No family history on file.    Social History     Tobacco Use    Smoking status: Never    Smokeless tobacco: Never   Substance Use Topics    Alcohol use: Not Currently     Comment: Alcoholic Drinks/day: occ prior to pregnancy    Drug use: Never       oxyCODONE (ROXICODONE) 5  MG tablet  cetirizine (ZYRTEC) 10 MG tablet  ferrous sulfate (FEROSUL) 325 (65 Fe) MG tablet  hydrALAZINE (APRESOLINE) 25 MG tablet  hydrocortisone, Perianal, (HYDROCORTISONE) 2.5 % cream  ibuprofen (ADVIL/MOTRIN) 800 MG tablet  labetalol (NORMODYNE) 300 MG tablet  levothyroxine (SYNTHROID/LEVOTHROID) 50 MCG tablet  magnesium 250 MG tablet  predniSONE (DELTASONE) 20 MG tablet  Prenatal Vit-Fe Fumarate-FA (PRENATAL MULTIVITAMIN W/IRON) 27-0.8 MG tablet        Physical Exam     First Vitals:  Patient Vitals for the past 24 hrs:   BP Temp Pulse Resp SpO2   10/21/23 0715 134/79 -- -- -- --   10/21/23 0630 (!) 141/87 -- 94 -- 96 %   10/21/23 0615 134/84 -- 97 -- 98 %   10/21/23 0600 (!) 141/85 -- 95 -- 96 %   10/21/23 0551 (!) 162/94 -- -- -- --   10/21/23 0548 (!) 190/104 -- -- -- --   10/21/23 0540 -- 98.5  F (36.9  C) 102 16 94 %       PHYSICAL EXAM:   Constitutional:   Lying on right side in bed. Easily conservatory.     HENT:  Normocephalic, posterior pharynx wnl, mucous membranes moist and dark pink   Eyes:  PERRL, EOMI, Conjunctiva normal, No discharge, no scleral icterus.  Respiratory:  Breathing easily, clear to auscultation and no stridor.   Cardiovascular:  rrr nl s1s2 0 murmurs, rubs, or gallops. Regular rhythm and rate.  Peripheral pulses dp, pt, and radial are wnl.  No peripheral edema   GI:  Bowel sounds normal, Soft, No tenderness, No flank tenderness, nondistended.  :No CVA tenderness.   Musculoskeletal:  Moves all extremities.  No erythematous or swollen major joints,   Integument:  normal color, csection wound not reviewed.  Neurologic:  Alert & oriented x 3, Normal motor function, Normal sensory function, No focal deficits noted. Normal speech.  Psychiatric:  Affect normal, Judgment normal, Mood normal.      Results     LAB AND RADIOLOGY:  All pertinent labs reviewed and interpreted  Results for orders placed or performed during the hospital encounter of 10/21/23   Neck soft tissue XR     Status:  None    Narrative    EXAM: XR NECK SOFT TISSUE  LOCATION: [facility]  DATE: 10/21/2023    INDICATION: 2 view.  feels throat swollen  COMPARISON: None.  TECHNIQUE: CR Neck Soft Tissue.      Impression    IMPRESSION: No prevertebral edema. Normal appearance of the epiglottis and larynx. No airway compromise.   [place] Draw     Status: None    Narrative    The following orders were created for panel order [place] Draw.  Procedure                               Abnormality         Status                     ---------                               -----------         ------                     Extra Blue Top Tube[[id number]]                              Final result               Extra Red Top Tube[[id number]]                               Final result               Extra Green Top (Lithium...[[id number]]                      Final result               Extra Purple Top Tube[[id number]]                            Final result                 Please view results for these tests on the individual orders.   Extra Blue Top Tube     Status: None   Result Value Ref Range    Hold Specimen JIC    Extra Red Top Tube     Status: None   Result Value Ref Range    Hold Specimen JIC    Extra Green Top (Lithium Heparin) Tube     Status: None   Result Value Ref Range    Hold Specimen JIC    Extra Purple Top Tube     Status: None   Result Value Ref Range    Hold Specimen JIC    Symptomatic Influenza A/B, RSV, & SARS-CoV2 PCR (COVID-19) Nasopharyngeal     Status: Normal    Specimen: Nasopharyngeal; Swab   Result Value Ref Range    Influenza A PCR Negative Negative    Influenza B PCR Negative Negative    RSV PCR Negative Negative    SARS CoV2 PCR Negative Negative    Narrative    Testing was performed using the Xpert Xpress CoV2/Flu/RSV Assay on the [employer] GeneXpert Instrument. This test should be ordered for the detection of SARS-CoV-2, influenza, and RSV viruses in individuals who meet clinical and/or epidemiological  criteria. Test performance is unknown in asymptomatic patients. This test is for in vitro diagnostic use under the FDA EUA for laboratories certified under CLIA to perform high or moderate complexity testing. This test has not been FDA cleared or approved. A negative result does not rule out the presence of PCR inhibitors in the specimen or target RNA in concentration below the limit of detection for the assay. If only one viral target is positive but coinfection with multiple targets is suspected, the sample should be re-tested with another FDA cleared, approved, or authorized test, if coinfection would change clinical management. This test was validated by the [facility]. These laboratories are certified under the Clinical Laboratory Improvement Amendments of 1988 (CLIA-88) as qualified to perform high complexity laboratory testing.         [facility] Documentation     Medical Decision Making    History:  Supplemental history from: Documented in chart, if applicable  External Record(s) reviewed: Documented in chart, if applicable.yes.  See ddocumentation    Work Up:  Chart documentation includes differential considered and any EKGs or imaging independently interpreted by provider, where specified.  In additional to work up documented, I considered the following work up: Documented in chart, if applicable.    External consultation:  Discussion of management with another provider: Documented in chart, if applicableob/gyn spoke to    Complicating factors:  Care impacted by chronic illness: Hypertension  Care affected by social determinants of health: N/A    Disposition considerations: Discharge. No recommendations on prescription strength medication(s). See documentation for any additional details.         The creation of this record is based on the scribe s observations of the work being performed by [facility] and the provider s statements to them. This document has been checked and  approved by MD Brittany Pickard MD  Emergency Medicine  [facility] EMERGENCY DEPARTMENT     Brittany Guy MD  10/21/23 0826

## 2023-10-21 NOTE — DISCHARGE INSTRUCTIONS
Make sure that you should be using the ibuprofen to help with pain as this is anti-inflammatory.  Keep well-hydrated while you are on the medication.  Continue the treatments as discussed yesterday here in the emergency department.  Do set up an appointment to see your OB/GYN later this week to discuss your pain levels.  Use a pillow at home obviously to sit on and they do have the donut pillows you can purchase from the pharmacy as well if you feel that is more comfortable.    You can certainly also apply cool packs to the area 20 minutes at a time but do not do it for longer.    You can use oxycodone as needed for pain, but then you need to make sure to take a stool softener like miralax with them as it will cause constipation which will make the hemorrhoids worse, so take enough miralax to keep the stools soft.  If they are soft enough or liquid, you don't need to take it.    Return as needed for throat issues.

## 2023-10-21 NOTE — ED TRIAGE NOTES
The patient reports that she was seen in the er yesterday for hemmeroid pain. She was given a steroid cream. The steroid did not help. She reports pain and swelling in her throat tonight and reports she cannot swallow. She denies difficulty breathing. She is hypertensive in triage and is 2 weeks post .

## 2024-09-28 ENCOUNTER — HEALTH MAINTENANCE LETTER (OUTPATIENT)
Age: 34
End: 2024-09-28

## 2025-05-25 ENCOUNTER — APPOINTMENT (OUTPATIENT)
Dept: ULTRASOUND IMAGING | Facility: HOSPITAL | Age: 35
End: 2025-05-25
Attending: EMERGENCY MEDICINE
Payer: COMMERCIAL

## 2025-05-25 ENCOUNTER — HOSPITAL ENCOUNTER (OUTPATIENT)
Facility: HOSPITAL | Age: 35
Setting detail: OBSERVATION
Discharge: HOME | End: 2025-05-25
Attending: EMERGENCY MEDICINE
Payer: COMMERCIAL

## 2025-05-25 ENCOUNTER — APPOINTMENT (OUTPATIENT)
Dept: RADIOLOGY | Facility: HOSPITAL | Age: 35
End: 2025-05-25
Attending: EMERGENCY MEDICINE
Payer: COMMERCIAL

## 2025-05-25 ENCOUNTER — ANESTHESIA (OUTPATIENT)
Dept: SURGERY | Facility: HOSPITAL | Age: 35
End: 2025-05-25
Payer: COMMERCIAL

## 2025-05-25 ENCOUNTER — ANESTHESIA EVENT (OUTPATIENT)
Dept: SURGERY | Facility: HOSPITAL | Age: 35
End: 2025-05-25
Payer: COMMERCIAL

## 2025-05-25 VITALS
OXYGEN SATURATION: 99 % | DIASTOLIC BLOOD PRESSURE: 87 MMHG | HEART RATE: 72 BPM | WEIGHT: 211.64 LBS | HEIGHT: 61 IN | RESPIRATION RATE: 20 BRPM | SYSTOLIC BLOOD PRESSURE: 146 MMHG | BODY MASS INDEX: 39.96 KG/M2 | TEMPERATURE: 97.9 F

## 2025-05-25 DIAGNOSIS — R10.10 UPPER ABDOMINAL PAIN: ICD-10-CM

## 2025-05-25 DIAGNOSIS — R11.2 NAUSEA AND VOMITING, UNSPECIFIED VOMITING TYPE: ICD-10-CM

## 2025-05-25 DIAGNOSIS — K80.20 SYMPTOMATIC CHOLELITHIASIS: ICD-10-CM

## 2025-05-25 DIAGNOSIS — R79.89 ELEVATED LFTS: ICD-10-CM

## 2025-05-25 LAB
ALBUMIN SERPL BCG-MCNC: 4.2 G/DL (ref 3.5–5.2)
ALP SERPL-CCNC: 156 U/L (ref 40–150)
ALT SERPL W P-5'-P-CCNC: 88 U/L (ref 0–50)
ANION GAP SERPL CALCULATED.3IONS-SCNC: 8 MMOL/L (ref 7–15)
AST SERPL W P-5'-P-CCNC: 151 U/L (ref 0–45)
ATRIAL RATE - MUSE: 101 BPM
BILIRUB DIRECT SERPL-MCNC: 0.29 MG/DL (ref 0–0.3)
BILIRUB SERPL-MCNC: 0.6 MG/DL
BUN SERPL-MCNC: 11.2 MG/DL (ref 6–20)
CALCIUM SERPL-MCNC: 9.1 MG/DL (ref 8.8–10.4)
CHLORIDE SERPL-SCNC: 102 MMOL/L (ref 98–107)
CREAT SERPL-MCNC: 0.7 MG/DL (ref 0.51–0.95)
D DIMER PPP FEU-MCNC: 0.3 UG/ML FEU (ref 0–0.5)
DIASTOLIC BLOOD PRESSURE - MUSE: NORMAL MMHG
EGFRCR SERPLBLD CKD-EPI 2021: >90 ML/MIN/1.73M2
ERYTHROCYTE [DISTWIDTH] IN BLOOD BY AUTOMATED COUNT: 13 % (ref 10–15)
GLUCOSE SERPL-MCNC: 101 MG/DL (ref 70–99)
HCO3 SERPL-SCNC: 31 MMOL/L (ref 22–29)
HCT VFR BLD AUTO: 37.2 % (ref 35–47)
HGB BLD-MCNC: 12.3 G/DL (ref 11.7–15.7)
INTERPRETATION ECG - MUSE: NORMAL
LIPASE SERPL-CCNC: 29 U/L (ref 13–60)
MAGNESIUM SERPL-MCNC: 2.2 MG/DL (ref 1.7–2.3)
MCH RBC QN AUTO: 28.4 PG (ref 26.5–33)
MCHC RBC AUTO-ENTMCNC: 33.1 G/DL (ref 31.5–36.5)
MCV RBC AUTO: 86 FL (ref 78–100)
P AXIS - MUSE: 62 DEGREES
PLATELET # BLD AUTO: 279 10E3/UL (ref 150–450)
POTASSIUM SERPL-SCNC: 3.5 MMOL/L (ref 3.4–5.3)
PR INTERVAL - MUSE: 172 MS
PROT SERPL-MCNC: 6.9 G/DL (ref 6.4–8.3)
QRS DURATION - MUSE: 86 MS
QT - MUSE: 326 MS
QTC - MUSE: 422 MS
R AXIS - MUSE: 95 DEGREES
RBC # BLD AUTO: 4.33 10E6/UL (ref 3.8–5.2)
SODIUM SERPL-SCNC: 141 MMOL/L (ref 135–145)
SYSTOLIC BLOOD PRESSURE - MUSE: NORMAL MMHG
T AXIS - MUSE: -43 DEGREES
TROPONIN T SERPL HS-MCNC: <6 NG/L
VENTRICULAR RATE- MUSE: 101 BPM
WBC # BLD AUTO: 6.5 10E3/UL (ref 4–11)

## 2025-05-25 PROCEDURE — 96365 THER/PROPH/DIAG IV INF INIT: CPT | Mod: 59

## 2025-05-25 PROCEDURE — 250N000011 HC RX IP 250 OP 636: Performed by: NURSE ANESTHETIST, CERTIFIED REGISTERED

## 2025-05-25 PROCEDURE — 360N000076 HC SURGERY LEVEL 3, PER MIN: Performed by: SURGERY

## 2025-05-25 PROCEDURE — 88304 TISSUE EXAM BY PATHOLOGIST: CPT | Mod: TC | Performed by: SURGERY

## 2025-05-25 PROCEDURE — 370N000017 HC ANESTHESIA TECHNICAL FEE, PER MIN: Performed by: SURGERY

## 2025-05-25 PROCEDURE — 250N000011 HC RX IP 250 OP 636: Performed by: SURGERY

## 2025-05-25 PROCEDURE — 999N000141 HC STATISTIC PRE-PROCEDURE NURSING ASSESSMENT: Performed by: SURGERY

## 2025-05-25 PROCEDURE — 96361 HYDRATE IV INFUSION ADD-ON: CPT

## 2025-05-25 PROCEDURE — 47562 LAPAROSCOPIC CHOLECYSTECTOMY: CPT | Performed by: SURGERY

## 2025-05-25 PROCEDURE — 272N000001 HC OR GENERAL SUPPLY STERILE: Performed by: SURGERY

## 2025-05-25 PROCEDURE — 36415 COLL VENOUS BLD VENIPUNCTURE: CPT | Performed by: EMERGENCY MEDICINE

## 2025-05-25 PROCEDURE — 99203 OFFICE O/P NEW LOW 30 MIN: CPT | Mod: 57 | Performed by: SURGERY

## 2025-05-25 PROCEDURE — 250N000013 HC RX MED GY IP 250 OP 250 PS 637: Performed by: SURGERY

## 2025-05-25 PROCEDURE — 99285 EMERGENCY DEPT VISIT HI MDM: CPT | Mod: 25

## 2025-05-25 PROCEDURE — 83735 ASSAY OF MAGNESIUM: CPT | Performed by: EMERGENCY MEDICINE

## 2025-05-25 PROCEDURE — 93005 ELECTROCARDIOGRAM TRACING: CPT | Performed by: EMERGENCY MEDICINE

## 2025-05-25 PROCEDURE — 250N000011 HC RX IP 250 OP 636

## 2025-05-25 PROCEDURE — 258N000003 HC RX IP 258 OP 636: Performed by: PAIN MEDICINE

## 2025-05-25 PROCEDURE — 99235 HOSP IP/OBS SAME DATE MOD 70: CPT | Mod: GC

## 2025-05-25 PROCEDURE — 99222 1ST HOSP IP/OBS MODERATE 55: CPT | Mod: 57

## 2025-05-25 PROCEDURE — 250N000025 HC SEVOFLURANE, PER MIN: Performed by: SURGERY

## 2025-05-25 PROCEDURE — 96375 TX/PRO/DX INJ NEW DRUG ADDON: CPT | Mod: 59

## 2025-05-25 PROCEDURE — 71046 X-RAY EXAM CHEST 2 VIEWS: CPT

## 2025-05-25 PROCEDURE — G0378 HOSPITAL OBSERVATION PER HR: HCPCS

## 2025-05-25 PROCEDURE — 84484 ASSAY OF TROPONIN QUANT: CPT | Performed by: EMERGENCY MEDICINE

## 2025-05-25 PROCEDURE — 82247 BILIRUBIN TOTAL: CPT | Performed by: EMERGENCY MEDICINE

## 2025-05-25 PROCEDURE — 258N000003 HC RX IP 258 OP 636: Performed by: EMERGENCY MEDICINE

## 2025-05-25 PROCEDURE — 85379 FIBRIN DEGRADATION QUANT: CPT | Performed by: EMERGENCY MEDICINE

## 2025-05-25 PROCEDURE — 250N000009 HC RX 250: Performed by: NURSE ANESTHETIST, CERTIFIED REGISTERED

## 2025-05-25 PROCEDURE — 710N000009 HC RECOVERY PHASE 1, LEVEL 1, PER MIN: Performed by: SURGERY

## 2025-05-25 PROCEDURE — 258N000003 HC RX IP 258 OP 636

## 2025-05-25 PROCEDURE — 250N000013 HC RX MED GY IP 250 OP 250 PS 637

## 2025-05-25 PROCEDURE — 76705 ECHO EXAM OF ABDOMEN: CPT

## 2025-05-25 PROCEDURE — 258N000003 HC RX IP 258 OP 636: Performed by: NURSE ANESTHETIST, CERTIFIED REGISTERED

## 2025-05-25 PROCEDURE — 85014 HEMATOCRIT: CPT | Performed by: EMERGENCY MEDICINE

## 2025-05-25 PROCEDURE — 250N000009 HC RX 250: Performed by: SURGERY

## 2025-05-25 PROCEDURE — 83690 ASSAY OF LIPASE: CPT | Performed by: EMERGENCY MEDICINE

## 2025-05-25 PROCEDURE — 250N000011 HC RX IP 250 OP 636: Performed by: EMERGENCY MEDICINE

## 2025-05-25 PROCEDURE — 88304 TISSUE EXAM BY PATHOLOGIST: CPT | Mod: 26 | Performed by: PATHOLOGY

## 2025-05-25 PROCEDURE — 250N000011 HC RX IP 250 OP 636: Performed by: PAIN MEDICINE

## 2025-05-25 RX ORDER — HYDROMORPHONE HCL IN WATER/PF 6 MG/30 ML
0.4 PATIENT CONTROLLED ANALGESIA SYRINGE INTRAVENOUS EVERY 5 MIN PRN
Status: DISCONTINUED | OUTPATIENT
Start: 2025-05-25 | End: 2025-05-25 | Stop reason: HOSPADM

## 2025-05-25 RX ORDER — IBUPROFEN 200 MG
600 TABLET ORAL EVERY 6 HOURS PRN
Status: DISCONTINUED | OUTPATIENT
Start: 2025-05-25 | End: 2025-05-25 | Stop reason: HOSPADM

## 2025-05-25 RX ORDER — AMOXICILLIN 250 MG
2 CAPSULE ORAL 2 TIMES DAILY PRN
Status: DISCONTINUED | OUTPATIENT
Start: 2025-05-25 | End: 2025-05-25 | Stop reason: HOSPADM

## 2025-05-25 RX ORDER — ONDANSETRON 4 MG/1
4 TABLET, ORALLY DISINTEGRATING ORAL EVERY 6 HOURS PRN
Status: DISCONTINUED | OUTPATIENT
Start: 2025-05-25 | End: 2025-05-25 | Stop reason: HOSPADM

## 2025-05-25 RX ORDER — CEFAZOLIN SODIUM/WATER 2 G/20 ML
2 SYRINGE (ML) INTRAVENOUS SEE ADMIN INSTRUCTIONS
Status: DISCONTINUED | OUTPATIENT
Start: 2025-05-25 | End: 2025-05-25 | Stop reason: HOSPADM

## 2025-05-25 RX ORDER — OXYCODONE HYDROCHLORIDE 5 MG/1
10 TABLET ORAL
Status: DISCONTINUED | OUTPATIENT
Start: 2025-05-25 | End: 2025-05-25 | Stop reason: HOSPADM

## 2025-05-25 RX ORDER — ONDANSETRON 2 MG/ML
4 INJECTION INTRAMUSCULAR; INTRAVENOUS ONCE
Status: COMPLETED | OUTPATIENT
Start: 2025-05-25 | End: 2025-05-25

## 2025-05-25 RX ORDER — ACETAMINOPHEN 325 MG/1
650 TABLET ORAL EVERY 4 HOURS PRN
Qty: 50 TABLET | Refills: 0 | Status: SHIPPED | OUTPATIENT
Start: 2025-05-25

## 2025-05-25 RX ORDER — KETOROLAC TROMETHAMINE 15 MG/ML
15 INJECTION, SOLUTION INTRAMUSCULAR; INTRAVENOUS ONCE
Status: COMPLETED | OUTPATIENT
Start: 2025-05-25 | End: 2025-05-25

## 2025-05-25 RX ORDER — POLYETHYLENE GLYCOL 3350 17 G/17G
17 POWDER, FOR SOLUTION ORAL 2 TIMES DAILY PRN
Status: DISCONTINUED | OUTPATIENT
Start: 2025-05-25 | End: 2025-05-25 | Stop reason: HOSPADM

## 2025-05-25 RX ORDER — SODIUM CHLORIDE, SODIUM LACTATE, POTASSIUM CHLORIDE, CALCIUM CHLORIDE 600; 310; 30; 20 MG/100ML; MG/100ML; MG/100ML; MG/100ML
INJECTION, SOLUTION INTRAVENOUS CONTINUOUS
Status: DISCONTINUED | OUTPATIENT
Start: 2025-05-25 | End: 2025-05-25 | Stop reason: HOSPADM

## 2025-05-25 RX ORDER — LIDOCAINE HYDROCHLORIDE 20 MG/ML
INJECTION, SOLUTION INFILTRATION; PERINEURAL PRN
Status: DISCONTINUED | OUTPATIENT
Start: 2025-05-25 | End: 2025-05-25

## 2025-05-25 RX ORDER — ONDANSETRON 4 MG/1
4 TABLET, ORALLY DISINTEGRATING ORAL EVERY 30 MIN PRN
Status: DISCONTINUED | OUTPATIENT
Start: 2025-05-25 | End: 2025-05-25 | Stop reason: HOSPADM

## 2025-05-25 RX ORDER — DOCUSATE SODIUM 100 MG/1
100 CAPSULE, LIQUID FILLED ORAL ONCE
Status: COMPLETED | OUTPATIENT
Start: 2025-05-25 | End: 2025-05-25

## 2025-05-25 RX ORDER — ONDANSETRON 2 MG/ML
4 INJECTION INTRAMUSCULAR; INTRAVENOUS EVERY 30 MIN PRN
Status: DISCONTINUED | OUTPATIENT
Start: 2025-05-25 | End: 2025-05-25 | Stop reason: HOSPADM

## 2025-05-25 RX ORDER — ACETAMINOPHEN 650 MG/1
650 SUPPOSITORY RECTAL EVERY 4 HOURS PRN
Status: DISCONTINUED | OUTPATIENT
Start: 2025-05-25 | End: 2025-05-25 | Stop reason: HOSPADM

## 2025-05-25 RX ORDER — HYDROMORPHONE HCL IN WATER/PF 6 MG/30 ML
0.2 PATIENT CONTROLLED ANALGESIA SYRINGE INTRAVENOUS EVERY 5 MIN PRN
Status: DISCONTINUED | OUTPATIENT
Start: 2025-05-25 | End: 2025-05-25 | Stop reason: HOSPADM

## 2025-05-25 RX ORDER — LIDOCAINE 40 MG/G
CREAM TOPICAL
Status: DISCONTINUED | OUTPATIENT
Start: 2025-05-25 | End: 2025-05-25 | Stop reason: HOSPADM

## 2025-05-25 RX ORDER — ACETAMINOPHEN 325 MG/1
975 TABLET ORAL ONCE
Status: COMPLETED | OUTPATIENT
Start: 2025-05-25 | End: 2025-05-25

## 2025-05-25 RX ORDER — NALOXONE HYDROCHLORIDE 0.4 MG/ML
0.1 INJECTION, SOLUTION INTRAMUSCULAR; INTRAVENOUS; SUBCUTANEOUS
Status: DISCONTINUED | OUTPATIENT
Start: 2025-05-25 | End: 2025-05-25 | Stop reason: HOSPADM

## 2025-05-25 RX ORDER — LOSARTAN POTASSIUM AND HYDROCHLOROTHIAZIDE 12.5; 5 MG/1; MG/1
1 TABLET ORAL DAILY
Status: DISCONTINUED | OUTPATIENT
Start: 2025-05-25 | End: 2025-05-25 | Stop reason: HOSPADM

## 2025-05-25 RX ORDER — SODIUM CHLORIDE 9 MG/ML
INJECTION, SOLUTION INTRAVENOUS CONTINUOUS
Status: DISCONTINUED | OUTPATIENT
Start: 2025-05-25 | End: 2025-05-25 | Stop reason: HOSPADM

## 2025-05-25 RX ORDER — IBUPROFEN 600 MG/1
600 TABLET, FILM COATED ORAL EVERY 6 HOURS PRN
Qty: 30 TABLET | Refills: 0 | Status: SHIPPED | OUTPATIENT
Start: 2025-05-25

## 2025-05-25 RX ORDER — CEFAZOLIN SODIUM/WATER 2 G/20 ML
2 SYRINGE (ML) INTRAVENOUS
Status: COMPLETED | OUTPATIENT
Start: 2025-05-25 | End: 2025-05-25

## 2025-05-25 RX ORDER — ONDANSETRON 2 MG/ML
INJECTION INTRAMUSCULAR; INTRAVENOUS PRN
Status: DISCONTINUED | OUTPATIENT
Start: 2025-05-25 | End: 2025-05-25

## 2025-05-25 RX ORDER — ACETAMINOPHEN 325 MG/1
650 TABLET ORAL EVERY 4 HOURS PRN
Status: DISCONTINUED | OUTPATIENT
Start: 2025-05-25 | End: 2025-05-25 | Stop reason: HOSPADM

## 2025-05-25 RX ORDER — HYDROCODONE BITARTRATE AND ACETAMINOPHEN 5; 325 MG/1; MG/1
1-2 TABLET ORAL EVERY 4 HOURS PRN
Status: DISCONTINUED | OUTPATIENT
Start: 2025-05-25 | End: 2025-05-25 | Stop reason: HOSPADM

## 2025-05-25 RX ORDER — ACETAMINOPHEN 325 MG/1
650 TABLET ORAL
Status: DISCONTINUED | OUTPATIENT
Start: 2025-05-25 | End: 2025-05-25 | Stop reason: HOSPADM

## 2025-05-25 RX ORDER — SODIUM CHLORIDE, SODIUM LACTATE, POTASSIUM CHLORIDE, CALCIUM CHLORIDE 600; 310; 30; 20 MG/100ML; MG/100ML; MG/100ML; MG/100ML
INJECTION, SOLUTION INTRAVENOUS CONTINUOUS PRN
Status: DISCONTINUED | OUTPATIENT
Start: 2025-05-25 | End: 2025-05-25

## 2025-05-25 RX ORDER — LOSARTAN POTASSIUM AND HYDROCHLOROTHIAZIDE 12.5; 5 MG/1; MG/1
1 TABLET ORAL
COMMUNITY
Start: 2025-03-26

## 2025-05-25 RX ORDER — FENTANYL CITRATE 50 UG/ML
50 INJECTION, SOLUTION INTRAMUSCULAR; INTRAVENOUS EVERY 5 MIN PRN
Status: DISCONTINUED | OUTPATIENT
Start: 2025-05-25 | End: 2025-05-25 | Stop reason: HOSPADM

## 2025-05-25 RX ORDER — FENTANYL CITRATE 50 UG/ML
25 INJECTION, SOLUTION INTRAMUSCULAR; INTRAVENOUS EVERY 5 MIN PRN
Status: DISCONTINUED | OUTPATIENT
Start: 2025-05-25 | End: 2025-05-25 | Stop reason: HOSPADM

## 2025-05-25 RX ORDER — BUPIVACAINE HYDROCHLORIDE AND EPINEPHRINE 2.5; 5 MG/ML; UG/ML
INJECTION, SOLUTION EPIDURAL; INFILTRATION; INTRACAUDAL; PERINEURAL PRN
Status: DISCONTINUED | OUTPATIENT
Start: 2025-05-25 | End: 2025-05-25 | Stop reason: HOSPADM

## 2025-05-25 RX ORDER — AMOXICILLIN 250 MG
1 CAPSULE ORAL 2 TIMES DAILY PRN
Status: DISCONTINUED | OUTPATIENT
Start: 2025-05-25 | End: 2025-05-25 | Stop reason: HOSPADM

## 2025-05-25 RX ORDER — DEXAMETHASONE SODIUM PHOSPHATE 10 MG/ML
INJECTION, SOLUTION INTRAMUSCULAR; INTRAVENOUS PRN
Status: DISCONTINUED | OUTPATIENT
Start: 2025-05-25 | End: 2025-05-25

## 2025-05-25 RX ORDER — ONDANSETRON 4 MG/1
4 TABLET, ORALLY DISINTEGRATING ORAL
Status: DISCONTINUED | OUTPATIENT
Start: 2025-05-25 | End: 2025-05-25 | Stop reason: HOSPADM

## 2025-05-25 RX ORDER — OXYCODONE HYDROCHLORIDE 5 MG/1
5 TABLET ORAL
Status: DISCONTINUED | OUTPATIENT
Start: 2025-05-25 | End: 2025-05-25 | Stop reason: HOSPADM

## 2025-05-25 RX ORDER — HYDROCODONE BITARTRATE AND ACETAMINOPHEN 5; 325 MG/1; MG/1
1-2 TABLET ORAL EVERY 4 HOURS PRN
Qty: 10 TABLET | Refills: 0 | Status: SHIPPED | OUTPATIENT
Start: 2025-05-25

## 2025-05-25 RX ORDER — PROCHLORPERAZINE MALEATE 10 MG
10 TABLET ORAL EVERY 6 HOURS PRN
Status: DISCONTINUED | OUTPATIENT
Start: 2025-05-25 | End: 2025-05-25 | Stop reason: HOSPADM

## 2025-05-25 RX ORDER — PIPERACILLIN SODIUM, TAZOBACTAM SODIUM 3; .375 G/15ML; G/15ML
3.38 INJECTION, POWDER, LYOPHILIZED, FOR SOLUTION INTRAVENOUS ONCE
Status: COMPLETED | OUTPATIENT
Start: 2025-05-25 | End: 2025-05-25

## 2025-05-25 RX ORDER — ONDANSETRON 2 MG/ML
4 INJECTION INTRAMUSCULAR; INTRAVENOUS EVERY 6 HOURS PRN
Status: DISCONTINUED | OUTPATIENT
Start: 2025-05-25 | End: 2025-05-25 | Stop reason: HOSPADM

## 2025-05-25 RX ORDER — DEXAMETHASONE SODIUM PHOSPHATE 4 MG/ML
4 INJECTION, SOLUTION INTRA-ARTICULAR; INTRALESIONAL; INTRAMUSCULAR; INTRAVENOUS; SOFT TISSUE
Status: DISCONTINUED | OUTPATIENT
Start: 2025-05-25 | End: 2025-05-25 | Stop reason: HOSPADM

## 2025-05-25 RX ORDER — FENTANYL CITRATE 50 UG/ML
INJECTION, SOLUTION INTRAMUSCULAR; INTRAVENOUS PRN
Status: DISCONTINUED | OUTPATIENT
Start: 2025-05-25 | End: 2025-05-25

## 2025-05-25 RX ORDER — PROPOFOL 10 MG/ML
INJECTION, EMULSION INTRAVENOUS PRN
Status: DISCONTINUED | OUTPATIENT
Start: 2025-05-25 | End: 2025-05-25

## 2025-05-25 RX ADMIN — SODIUM CHLORIDE 1000 ML: 0.9 INJECTION, SOLUTION INTRAVENOUS at 02:24

## 2025-05-25 RX ADMIN — DOCUSATE SODIUM 100 MG: 100 CAPSULE, LIQUID FILLED ORAL at 11:19

## 2025-05-25 RX ADMIN — ONDANSETRON 4 MG: 2 INJECTION, SOLUTION INTRAMUSCULAR; INTRAVENOUS at 02:26

## 2025-05-25 RX ADMIN — FENTANYL CITRATE 100 MCG: 50 INJECTION, SOLUTION INTRAMUSCULAR; INTRAVENOUS at 13:54

## 2025-05-25 RX ADMIN — FENTANYL CITRATE 25 MCG: 50 INJECTION, SOLUTION INTRAMUSCULAR; INTRAVENOUS at 15:32

## 2025-05-25 RX ADMIN — Medication 2 G: at 13:53

## 2025-05-25 RX ADMIN — ONDANSETRON 4 MG: 2 INJECTION INTRAMUSCULAR; INTRAVENOUS at 14:41

## 2025-05-25 RX ADMIN — HYDROMORPHONE HYDROCHLORIDE 0.5 MG: 1 INJECTION, SOLUTION INTRAMUSCULAR; INTRAVENOUS; SUBCUTANEOUS at 14:22

## 2025-05-25 RX ADMIN — FAMOTIDINE 20 MG: 10 INJECTION, SOLUTION INTRAVENOUS at 02:26

## 2025-05-25 RX ADMIN — ACETAMINOPHEN 975 MG: 325 TABLET ORAL at 12:44

## 2025-05-25 RX ADMIN — SODIUM CHLORIDE, SODIUM LACTATE, POTASSIUM CHLORIDE, AND CALCIUM CHLORIDE: .6; .31; .03; .02 INJECTION, SOLUTION INTRAVENOUS at 13:49

## 2025-05-25 RX ADMIN — KETOROLAC TROMETHAMINE 15 MG: 15 INJECTION, SOLUTION INTRAMUSCULAR; INTRAVENOUS at 02:26

## 2025-05-25 RX ADMIN — SUGAMMADEX 200 MG: 100 INJECTION, SOLUTION INTRAVENOUS at 14:42

## 2025-05-25 RX ADMIN — DEXAMETHASONE SODIUM PHOSPHATE 10 MG: 10 INJECTION, SOLUTION INTRAMUSCULAR; INTRAVENOUS at 14:00

## 2025-05-25 RX ADMIN — ONDANSETRON 4 MG: 2 INJECTION, SOLUTION INTRAMUSCULAR; INTRAVENOUS at 15:10

## 2025-05-25 RX ADMIN — SODIUM CHLORIDE, SODIUM LACTATE, POTASSIUM CHLORIDE, AND CALCIUM CHLORIDE: .6; .31; .03; .02 INJECTION, SOLUTION INTRAVENOUS at 12:44

## 2025-05-25 RX ADMIN — PROCHLORPERAZINE EDISYLATE 5 MG: 5 INJECTION INTRAMUSCULAR; INTRAVENOUS at 15:43

## 2025-05-25 RX ADMIN — PROPOFOL 200 MG: 10 INJECTION, EMULSION INTRAVENOUS at 13:54

## 2025-05-25 RX ADMIN — SODIUM CHLORIDE: 0.9 INJECTION, SOLUTION INTRAVENOUS at 06:43

## 2025-05-25 RX ADMIN — MIDAZOLAM HYDROCHLORIDE 2 MG: 1 INJECTION, SOLUTION INTRAMUSCULAR; INTRAVENOUS at 13:49

## 2025-05-25 RX ADMIN — ONDANSETRON 4 MG: 4 TABLET, ORALLY DISINTEGRATING ORAL at 16:50

## 2025-05-25 RX ADMIN — PIPERACILLIN AND TAZOBACTAM 3.38 G: 3; .375 INJECTION, POWDER, FOR SOLUTION INTRAVENOUS at 04:29

## 2025-05-25 RX ADMIN — HYDROMORPHONE HYDROCHLORIDE 0.5 MG: 1 INJECTION, SOLUTION INTRAMUSCULAR; INTRAVENOUS; SUBCUTANEOUS at 14:27

## 2025-05-25 RX ADMIN — ROCURONIUM 50 MG: 50 INJECTION, SOLUTION INTRAVENOUS at 13:54

## 2025-05-25 RX ADMIN — ROCURONIUM 10 MG: 50 INJECTION, SOLUTION INTRAVENOUS at 14:30

## 2025-05-25 RX ADMIN — LIDOCAINE HYDROCHLORIDE 5 ML: 20 INJECTION, SOLUTION INFILTRATION; PERINEURAL at 13:54

## 2025-05-25 ASSESSMENT — ACTIVITIES OF DAILY LIVING (ADL)
ADLS_ACUITY_SCORE: 26
ADLS_ACUITY_SCORE: 52
ADLS_ACUITY_SCORE: 52
ADLS_ACUITY_SCORE: 26
ADLS_ACUITY_SCORE: 52
ADLS_ACUITY_SCORE: 26
ADLS_ACUITY_SCORE: 52
ADLS_ACUITY_SCORE: 52

## 2025-05-25 ASSESSMENT — COLUMBIA-SUICIDE SEVERITY RATING SCALE - C-SSRS
1. IN THE PAST MONTH, HAVE YOU WISHED YOU WERE DEAD OR WISHED YOU COULD GO TO SLEEP AND NOT WAKE UP?: NO
6. HAVE YOU EVER DONE ANYTHING, STARTED TO DO ANYTHING, OR PREPARED TO DO ANYTHING TO END YOUR LIFE?: NO
2. HAVE YOU ACTUALLY HAD ANY THOUGHTS OF KILLING YOURSELF IN THE PAST MONTH?: NO

## 2025-05-25 NOTE — H&P
[facility]    History and Physical - Hospitalist Service       Date of Admission:  5/25/2025    Assessment & Plan      Sola Akers is a 35 year old female admitted on 5/25/2025. She has a history of HTN and PCOS and is admitted for symptomatic cholelithiasis.     Symptomatic cholelithiasis   Elevated LFTs   Sola is a 35 y.o. female presenting with and acute episode of back pain and epigastric pain on the evening of 5/24. She has had several intermittent episodes of back pain with associated nausea and vomiting but noted the evening prior to admission that this pain was much worse and associated with more significant nausea and vomiting. She had previously taken TUMS with some improvement in her symptoms but this did not work with the acute worsening of her pain. She is on a GLP-1 and was concerned about gallbladder pathology which prompted her ED evaluation. Labs in the ED notable for elevation in ALT, AST, and alk phos. Normal lipase. WBC was WNL and electrolytes WNL. Cardiac workup in the setting of CP was unremarkable with negative trop. ECG demonstrating sinus tachycardia. CXR unremarkable. US of the RUQ obtained showing cholelithiasis without evidence of cholecystitis. She did receive a single dose of  Zosyn in the ED as well as 1L bolus NS. Surgery consulted in the ED and agreed with pursuing cholecystectomy. Surgery initially recommended patient be able to discharge for outpatient management but patient continued to be symptomatic and elected to be admitted for planned cholecystectomy on 5/25.   - Gen surgery consulted   - Plan for cholecystectomy on 5/25   - Clear liquid diet   - Pain control with Tylenol and ibuprofen. May add additional agent if needed.    - Nausea medications   - Monitor LFTs     Chronic conditions:   HTN: PTA Losartan-hydrochlorothiazide  Allergies: hold PTA zyrtec    Preop clearance: Patient's risk factors include HTN, HLD, DM, History of MI, and  smoking history. EKG shows sinus tach. Patient's functional capacity is Excellent: >7 METS: Jogging (10 minute mile), scrubbing floors, tennis  Revised Cardiac Risk Index for Pre-Operative Risk  High risk surgery? No  History of ischemic heart disease? No  History of CHF? No  History of cerebrovascular disease? No  Pre-op treatment w/ insulin? No  Pre-op creatinine > 2.0? No    0 points = class I risk 0.4% risk of major cardiac event       Observation Goals: -diagnostic tests and consults completed and resulted, -vital signs normal or at patient baseline, -tolerating oral intake to maintain hydration, -adequate pain control on oral analgesics, Nurse to notify provider when observation goals have been met and patient is ready for discharge.  Diet: Clear Liquid DietClear liquids.   DVT Prophylaxis: Pneumatic Compression Devices  Hooks Catheter: Not present  Fluids: mIVF with  ml/hr   Lines: None     Cardiac Monitoring: None  Code Status: Full Code    Clinically Significant Risk Factors Present on Admission                   # Hypertension: Noted on problem list           # Morbid Obesity: Estimated body mass index is 40.99 kg/m  as calculated from the following:    Height as of this encounter: 1.524 m (5').    Weight as of this encounter: 95.2 kg (209 lb 14.4 oz).              Disposition Plan      Expected Discharge Date: 05/26/2025                The patient's care will be formally staffed with the attending physician during morning report.     Briseyda Phipps DO  Hospitalist Service  [facility]  Securely message with [email or web address] (more info)  Text page via [facility] Paging/Directory   ______________________________________________________________________    Chief Complaint   Back pain, chest pain, N/V    History is obtained from the patient    History of Present Illness   Sola Akers is a 35 year old female who presents with sharp, radiating mid-back pain starting on the evening of 5/24.  This is associated with sharp and burning epigastric pain as well as nausea and vomiting. The patient reports that she has had several similar, less severe episodes of back and epigastric pain over the last month but that she felt this was reflux and her symptoms had improved with TUMS. However, her symptoms were more severe last night and did not respond to her usual regimen which prompted her to be evaluated.  Does use semaglutide and noted that she was concerned about gallbladder and pancreatic pathology due to known risks associated with this medication which also encouraged her to come to the ED for evaluation. No recent sick contacts. Denies SOB or dysuria. No recent changes to medical history or medications.      Past Medical History    Past Medical History:   Diagnosis Date    Closed dislocation of patella 2021    Formatting of this note might be different from the original. Created by [facility] Annotation: Aug 31 2007  6:22PM - Elvira Christie: arthroscopy     Gestational hypertension     PCOS (polycystic ovarian syndrome)     Pre-eclampsia in postpartum period 2021    Thyroid disease        Past Surgical History   Past Surgical History:   Procedure Laterality Date    AS KNEE SCOPE, DIAGNOSTIC       SECTION N/A 2021    Procedure:  SECTION;  Surgeon: Elvira Bustos DO;  Location: [facility] OR    COMBINED  SECTION, SALPINGECTOMY BILATERAL Bilateral 10/5/2023    Procedure: REPEAT  SECTION WITH BILATERAL SALPINGECTOMY;  Surgeon: Elvira Bustos DO;  Location: [facility] OR       Prior to Admission Medications   Prior to Admission Medications   Prescriptions Last Dose Informant Patient Reported? Taking?   cetirizine (ZYRTEC) 10 MG tablet 2025 Evening Self Yes Yes   Sig: Take 10 mg by mouth daily   losartan-hydrochlorothiazide (HYZAAR) 50-12.5 MG tablet 2025 Evening Self Yes Yes   Sig: Take 1 tablet by mouth daily at 2 pm.       Facility-Administered Medications: None           Physical Exam   Vital Signs: Temp: 98.5  F (36.9  C) Temp src: Oral BP: 115/70 Pulse: 76   Resp: 23 SpO2: 97 % O2 Device: None (Room air)    Weight: 209 lbs 14.4 oz    GEN: Pleasant female, in no acute distress.   HEENT:Anicteric sclera. Moist mucous membranes.   PULM: Non-labored breathing. No use of accessory muscles. Clear to ausculation bilaterally. No wheezes or crackles.   CV: Regular rate and rhythm. Normal S1, S2. No rubs, murmurs, or gallops.    ABDOMEN: Normoactive bowel sounds.Mild diffuse tenderness. Non-distended.    EXTREMITIES:  No clubbing, cyanosis, or edema.    NEURO:  Awake. Oriented to person, place, time and situation. Cranial nerves 2-12 grossly intact. Moving all extremities.  \  PSYCH: Calm. Appropriate affect, insight, judgment.     Medical Decision Making             Data     I have personally reviewed the following data over the past 24 hrs:    6.5  \   12.3   / 279     141 102 11.2 /  101 (H)   3.5 31 (H) 0.70 \     ALT: 88 (H) AST: 151 (H) AP: 156 (H) TBILI: 0.6   ALB: 4.2 TOT PROTEIN: 6.9 LIPASE: 29     Trop: <6 BNP: N/A     INR:  N/A PTT:  N/A   D-dimer:  0.30 Fibrinogen:  N/A       Imaging results reviewed over the past 24 hrs:   Recent Results (from the past 24 hours)   XR Chest 2 Views    Narrative    EXAM: XR CHEST 2 VIEWS  LOCATION: [facility]  DATE: 5/25/2025    INDICATION: mid back and chest pain  COMPARISON: None.      Impression    IMPRESSION:  1.  Clear lungs.  2.  No pleural effusion or pneumothorax.  3.  Normal heart size.      US Abdomen Limited    Narrative    EXAM: US ABDOMEN LIMITED  LOCATION: [facility]  DATE: 5/25/2025    INDICATION: upper abdominal pain, elevated LFTs  COMPARISON: 10/9/2023  TECHNIQUE: Limited abdominal ultrasound.    FINDINGS:    GALLBLADDER: Gallstones. No wall thickening, or pericholecystic fluid. Negative sonographic Nolasco's sign. There are  again multiple areas with comet tail artifact in the gallbladder wall consistent with cholesterolosis.    BILE DUCTS: No biliary dilatation. The common duct measures 3 mm.    LIVER: Normal parenchyma with smooth contour. No focal mass. The portal vein is patent with flow in the normal direction.    RIGHT KIDNEY: No hydronephrosis.    PANCREAS: The visualized portions are normal.    No ascites.      Impression    IMPRESSION:  1.  Cholelithiasis.

## 2025-05-25 NOTE — ANESTHESIA POSTPROCEDURE EVALUATION
Patient: Sola Akers    Procedure: Procedure(s):  CHOLECYSTECTOMY, LAPAROSCOPIC       Anesthesia Type:  General    Note:  Disposition: Inpatient   Postop Pain Control: Uneventful            Sign Out: Well controlled pain   PONV: No   Neuro/Psych: Uneventful            Sign Out: Acceptable/Baseline neuro status   Airway/Respiratory: Uneventful            Sign Out: Acceptable/Baseline resp. status   CV/Hemodynamics: Uneventful            Sign Out: Acceptable CV status; No obvious hypovolemia; No obvious fluid overload   Other NRE: NONE   DID A NON-ROUTINE EVENT OCCUR? No           Last vitals:  Vitals Value Taken Time   /99 05/25/25 16:00   Temp 36.6  C (97.8  F) 05/25/25 15:00   Pulse 70 05/25/25 16:02   Resp 13 05/25/25 16:02   SpO2 95 % 05/25/25 16:02   Vitals shown include unfiled device data.    Electronically Signed By: Katiuska Martinez MD  May 25, 2025  6:43 PM

## 2025-05-25 NOTE — CONSULTS
General Surgery Consultation  Sola Akers MRN# [id number]   Age/Sex: 35 year old female YOB: 1990     Reason for consult: 1. Symptomatic cholelithiasis    2. Elevated LFTs    3. Upper abdominal pain    4. Nausea and vomiting, unspecified vomiting type            Requesting physician: ED                  Assessment and Plan:   Assessment:  Sola Akers is a 35 year old here with fourth episode of abdominal and back pain with imaging demonstrating cholelithiasis.  Does sound like a biliary colic picture.  Patient has lost weight recently, is on GLP-1, has elevation of LFTs and alk phos which were actually seen at her PCP visit about 6 months ago as well per patient.  Discussion regarding cholecystectomy and patient is amenable, plan for potential surgery today    Plan:  -NPO for now; will hear from OR regarding potential timing of cholecystectomy early afternoon, if deferred to tomorrow patient can have a diet tonight then n.p.o. at midnight  -Activity as tolerated  -Plan for laparoscopic cholecystectomy however unknown timing at this time, working with OR for scheduling  -If patient is feeling well enough and would like to discharge and have an elective surgery, notify surgery          Chief Complaint:     Chief Complaint   Patient presents with    Back Pain        History is obtained from the patient and electronic health record    HPI:   Sola Akers is a 35 year old female who presents with abdominal pain mostly epigastric radiating to the back.  Patient has had this about 4 times prior in the last month or so, usually lasts about 5 a few hours and sometimes is helped with taking her reflux medications.  This time was more severe and reflux medications did not help at all.  She presented with nausea and vomiting, denies hematemesis.  Is voiding appropriately, passing flatus, having stools however is somewhat changed since she started GLP-1 therapy.    She takes her GLP-1 therapy every 3  days and is on a decreasing dose of semaglutide.  Taken 2 days ago and is supposed to take tomorrow or tonight.    Past surgical history includes 2 C-sections.  No other trauma to the abdomen    Denies other past medical history or GI issues, past endoscopy or known gastric ulcer, etc. has not had pancreatitis on GLP-1 therapy          Past Medical History:     Past Medical History:   Diagnosis Date    Closed dislocation of patella 2021    Formatting of this note might be different from the original. Created by [facility] Annotation: Aug 31 2007  6:22PM - Elvira Christie: arthroscopy     Gestational hypertension     PCOS (polycystic ovarian syndrome)     Pre-eclampsia in postpartum period 2021    Thyroid disease               Past Surgical History:     Past Surgical History:   Procedure Laterality Date    AS KNEE SCOPE, DIAGNOSTIC       SECTION N/A 2021    Procedure:  SECTION;  Surgeon: Elvira Bustos DO;  Location: [facility] OR    COMBINED  SECTION, SALPINGECTOMY BILATERAL Bilateral 10/5/2023    Procedure: REPEAT  SECTION WITH BILATERAL SALPINGECTOMY;  Surgeon: Elvira Bustos DO;  Location: [facility]             Social History:    reports that she has never smoked. She has never used smokeless tobacco. She reports that she does not currently use alcohol. She reports that she does not use drugs.           Family History:   No family history on file.           Allergies:     Allergies   Allergen Reactions    Bee Venom Anaphylaxis    Seafood Anaphylaxis    Kiwi [Kiwi Extract] Hives    Sulfa (Sulfonamide Antibiotics) [Sulfa Antibiotics] Unknown    Venom-Honey Bee [Bee Venom] Unknown    Penicillins Rash    Sulfa Antibiotics Rash              Medications:     Prior to Admission medications    Medication Sig Start Date End Date Taking? Authorizing Provider   cetirizine (ZYRTEC) 10 MG tablet Take 10 mg by mouth daily   Yes Reported,  Patient   losartan-hydrochlorothiazide (HYZAAR) 50-12.5 MG tablet Take 1 tablet by mouth daily at 2 pm. 3/26/25  Yes Reported, Patient              Review of Systems:   The Review of Systems is negative other than noted in the HPI            Physical Exam:   Temp:  [98.1  F (36.7  C)-98.5  F (36.9  C)] 98.1  F (36.7  C)  Pulse:  [] 76  Resp:  [16-23] 18  BP: (115-151)/() 117/68  SpO2:  [97 %-98 %] 98 %      Intake/Output Summary (Last 24 hours) at 2025 0753  Last data filed at 2025 0506  Gross per 24 hour   Intake 1100 ml   Output --   Net 1100 ml      Constitutional:   Awake, alert, cooperative, no apparent distress, and appears stated age       Eyes:   PERRL, conjunctiva/corneas clear, EOM's intact; no scleral edema or icterus noted        ENT:   Normocephalic, without obvious abnormality, atraumatic, Lips, mucosa, and tongue normal        Lungs:   Normal respiratory effort, no accessory muscle use     Abdomen:   Generally soft and nondistended abdomen visualized.  Tenderness with palpation over the epigastric area and right upper quadrant.  Equivocal to negative Nolasco sign.  No peritoneal signs or guarding on exam and no tenderness with palpation over the other quadrants       Musculoskeletal:   No obvious swelling, bruising or deformity on exposed limbs/skin       Skin:   Skin color and texture normal for patient, no rashes or lesions on exposed skin; previous  scar Pfannenstiel             Data:         All imaging studies reviewed by me.    Results for orders placed or performed during the hospital encounter of 25 (from the past 24 hours)   ECG 12-LEAD WITH MUSE (LHE)   Result Value Ref Range    Systolic Blood Pressure  mmHg    Diastolic Blood Pressure  mmHg    Ventricular Rate 101 BPM    Atrial Rate 101 BPM    WI Interval 172 ms    QRS Duration 86 ms     ms    QTc 422 ms    P Axis 62 degrees    R AXIS 95 degrees    T Axis -43 degrees    Interpretation ECG        Sinus tachycardia  Rightward axis  Septal infarct , age undetermined  T wave abnormality, consider inferior ischemia  Abnormal ECG  No previous ECGs available  Confirmed by SEE ED PROVIDER NOTE FOR, ECG INTERPRETATION ([id number]),  TRACY SOLANO ([id number]) on 5/25/2025 3:11:46 AM     Hepatic function panel   Result Value Ref Range    Protein Total 6.9 6.4 - 8.3 g/dL    Albumin 4.2 3.5 - 5.2 g/dL    Bilirubin Total 0.6 <=1.2 mg/dL    Alkaline Phosphatase 156 (H) 40 - 150 U/L     (H) 0 - 45 U/L    ALT 88 (H) 0 - 50 U/L    Bilirubin Direct 0.29 0.00 - 0.30 mg/dL   Lipase   Result Value Ref Range    Lipase 29 13 - 60 U/L   Basic metabolic panel   Result Value Ref Range    Sodium 141 135 - 145 mmol/L    Potassium 3.5 3.4 - 5.3 mmol/L    Chloride 102 98 - 107 mmol/L    Carbon Dioxide (CO2) 31 (H) 22 - 29 mmol/L    Anion Gap 8 7 - 15 mmol/L    Urea Nitrogen 11.2 6.0 - 20.0 mg/dL    Creatinine 0.70 0.51 - 0.95 mg/dL    GFR Estimate >90 >60 mL/min/1.73m2    Calcium 9.1 8.8 - 10.4 mg/dL    Glucose 101 (H) 70 - 99 mg/dL   CBC (+ platelets, no diff)   Result Value Ref Range    WBC Count 6.5 4.0 - 11.0 10e3/uL    RBC Count 4.33 3.80 - 5.20 10e6/uL    Hemoglobin 12.3 11.7 - 15.7 g/dL    Hematocrit 37.2 35.0 - 47.0 %    MCV 86 78 - 100 fL    MCH 28.4 26.5 - 33.0 pg    MCHC 33.1 31.5 - 36.5 g/dL    RDW 13.0 10.0 - 15.0 %    Platelet Count 279 150 - 450 10e3/uL   Troponin T, High Sensitivity   Result Value Ref Range    Troponin T, High Sensitivity <6 <=14 ng/L   Magnesium   Result Value Ref Range    Magnesium 2.2 1.7 - 2.3 mg/dL   D dimer quantitative   Result Value Ref Range    D-Dimer Quantitative 0.30 0.00 - 0.50 ug/mL U    [facility]    This D-dimer assay is intended for use in conjunction with a clinical pretest probability assessment model to exclude pulmonary embolism (PE) and deep venous thrombosis (DVT) in outpatients suspected of PE or DVT. The cut-off value is 0.50 ug/mL FEU.   XR Chest 2 Views    Narrative     EXAM: XR CHEST 2 VIEWS  LOCATION: [facility]  DATE: 5/25/2025    INDICATION: mid back and chest pain  COMPARISON: None.      Impression    IMPRESSION:  1.  Clear lungs.  2.  No pleural effusion or pneumothorax.  3.  Normal heart size.      US Abdomen Limited    Narrative    EXAM: US ABDOMEN LIMITED  LOCATION: [facility]  DATE: 5/25/2025    INDICATION: upper abdominal pain, elevated LFTs  COMPARISON: 10/9/2023  TECHNIQUE: Limited abdominal ultrasound.    FINDINGS:    GALLBLADDER: Gallstones. No wall thickening, or pericholecystic fluid. Negative sonographic Nolasco's sign. There are again multiple areas with comet tail artifact in the gallbladder wall consistent with cholesterolosis.    BILE DUCTS: No biliary dilatation. The common duct measures 3 mm.    LIVER: Normal parenchyma with smooth contour. No focal mass. The portal vein is patent with flow in the normal direction.    RIGHT KIDNEY: No hydronephrosis.    PANCREAS: The visualized portions are normal.    No ascites.      Impression    IMPRESSION:  1.  Cholelithiasis.               JOSE ARMANDO [facility]  [facility] General Surgery  [facility]  [place] [address]

## 2025-05-25 NOTE — ANESTHESIA CARE TRANSFER NOTE
Patient: Sola Akers    Procedure: Procedure(s):  CHOLECYSTECTOMY, LAPAROSCOPIC       Diagnosis: Symptomatic cholelithiasis [K80.20]  Diagnosis Additional Information: No value filed.    Anesthesia Type:   General     Note:    Oropharynx: oropharynx clear of all foreign objects  Level of Consciousness: awake  Oxygen Supplementation: nasal cannula  Level of Supplemental Oxygen (L/min / FiO2): 2  Independent Airway: airway patency satisfactory and stable  Dentition: dentition unchanged  Vital Signs Stable: post-procedure vital signs reviewed and stable  Report to RN Given: handoff report given  Patient transferred to: PACU    Handoff Report: Identifed the Patient, Identified the Reponsible Provider, Reviewed the pertinent medical history, Discussed the surgical course, Reviewed Intra-OP anesthesia mangement and issues during anesthesia, Set expectations for post-procedure period and Allowed opportunity for questions and acknowledgement of understanding      Vitals:  Vitals Value Taken Time   /81 05/25/2025      1455   Temp 97.3 05/25/2025      1455      Pulse 101 05/25/2025      1455      Resp 16 05/25/2025      1455      SpO2 97 05/25/2025      1455          Electronically Signed By: EVELIN Nguyen CRNA  May 25, 2025  2:58 PM

## 2025-05-25 NOTE — ED PROVIDER NOTES
EMERGENCY DEPARTMENT ENCOUNTER      NAME: Sola Akers  AGE: 35 year old female  YOB: 1990  EVALUATION DATE & TIME: 2025  1:49 AM    ED PROVIDER: Janis Levy MD    Chief Complaint   Patient presents with    Back Pain       FINAL IMPRESSION  1. Symptomatic cholelithiasis    2. Elevated LFTs    3. Upper abdominal pain    4. Nausea and vomiting, unspecified vomiting type        MEDICAL DECISION MAKING   Sola Akers is a 35 year old female who presents for evaluation of mid back, chest pain, nausea, and vomiting.  Patient reports ongoing and intermittent episodes of symptoms over the past month with increasing severity last night around 10 PM.  She has had 3 episodes of nonbloody emesis since that time.  She reports that she has history of acid reflux but her symptoms typically resolve when she uses Gaviscon and Tums.  She also takes an antacid medication nightly.  She has not had any recent change in diet, new foods, or sick contacts.  She does note that she is on a GLP-1 medication and is aware that this can sometimes cause problems with the gallbladder so she wanted to have this ruled out today.  No other new complaints or concerns.  She has a history of  but no other abdominal surgeries.  She does not drink alcohol and does not smoke tobacco.  Vitals on arrival notable for low-grade tachycardia with heart rate of 104 in triage but normal at time of my initial evaluation.    Outside records reviewed.  Patient has a history of hypertension.  No recent clinic or ED visits related complaints.    I considered a broad differential including but not limited to GERD, esophagitis, PUD, H Pylori, gastritis, pancreatitis, hepatobiliary disease, typical ACS/ischemia, unstable angina, pericarditis, myocarditis, PE, pneumonia, muscular strain/sprain, radiculopathy. I have low suspicion for appendicitis, diverticulitis, obstruction, pyelonephritis, nephrolithiasis, or other acute  intra-abdominal and/or surgical process given history and exam.  Discussed options for workup and management with the patient.  We have agreed on plan for labs, EKG, ultrasound of right upper quadrant, neither chest x-ray or CT scan depending on results of D-dimer.  Will manage symptoms with IV analgesic/antiemetic and PPI.    ED Course as of 05/25/25 0446   Sun May 25, 2025   0300 D-Dimer Quantitative: 0.30  D-dimer negative. With this, I have low suspicion for PE and do not believe further workup with CT PE study necessary. Will proceed with CXR.     0305 Troponin T, High Sensitivity: <6  High sensitivity troponin below age adjusted cutoff. ACS less likely in the setting of ECG without acute ischemic changes and I do not feel delta troponin necessary given timing of symptoms.   0405 XR Chest 2 Views  I independently reviewed chest x-ray which showed no obvious pneumothorax, infiltrate, pulmonary edema, or displaced rib fractures.     0408 US Abdomen Limited  Ultrasound revealed cholelithiasis but no evidence of acute cholecystitis.   0409 Hepatic function panel(!)  LFTs notable for elevated AST, ALT, and alk phos, concerning for hepatobiliary process.  Normal total and direct bilirubin.   0409 Lipase: 29  Lipase within normal limits, symptoms less likely related to acute pancreatitis.   0409 Basic metabolic panel(!)  BMP reassuring. No evidence of DAVE, acidosis, or significant electrolyte derangement.     I rechecked the patient and updated with results.  She did have improvement in symptoms after initial interventions.  We discussed potential etiology of symptoms and I suggested that symptomatic gallstones might be the culprit.  She agreed and would feel more comfortable staying in the hospital to have her gallbladder out then going home to follow-up on an outpatient basis.  I believe this is very reasonable given abnormal LFTs and the fact that her symptoms have been increasing in frequency and severity overall.       I discussed the case with Dr. Bustos, general surgeon, who would be happy to take patient's gallbladder out, though notes that the OR is full until later this evening.  I updated patient after this was conveyed and she remained most interested in staying in the hospital rather than going home.  Will plan to give a dose of IV antibiotics now, keep her n.p.o. outside of clears, and admit to hospital medicine team.    I discussed the case with Dr. Phipps with [facility] resident team who will facilitate admission.       Additional Considerations in MDM  History:  Supplemental history from: NA  External Record(s) reviewed: No recent clinic or ED visits for [place] complaints    Work Up:  Chart documentation includes differential diagnoses considered and any EKGs or imaging independently interpreted as specified above.   In additional to work up documented, I considered additional advanced imaging and laboratory workup but deferred after shared decision making conversation with patient/family    External Consultation(s):  Discussion of management with another provider as documented above and in ED course     Chronic Illness(es):  Care impacted by chronic illness(es): Hypertension    Disposition considerations: Admit.    MIPS: Not Applicable     Sepsis/STEMI/Stroke: None      ED COURSE  2:06 AM I met with the patient, obtained history, performed an initial exam, and discussed options and plan for diagnostics and treatment here in the ED.   3:28 AM I rechecked the patient  4:13 AM I rechecked the patient.   4:18 AM I discussed the case with Dr. Jamilah Bustos, hospitalist.   4:21 AM I rechecked the patient.   4:46 AM I discussed the case with Dr. Phipps with [facility] resident team    MEDICATIONS GIVEN IN THE ED  Medications   piperacillin-tazobactam (ZOSYN) 3.375 g vial to attach to  mL bag (3.375 g Intravenous $New Bag 5/25/25 5883)   sodium chloride 0.9% BOLUS 1,000 mL (0 mLs Intravenous Stopped 5/25/25 6431)    ketorolac (TORADOL) injection 15 mg (15 mg Intravenous $Given 25)   famotidine (PEPCID) injection 20 mg (20 mg Intravenous $Given 25)   ondansetron (ZOFRAN) injection 4 mg (4 mg Intravenous $Given 25)       NEW PRESCRIPTIONS STARTED AT TODAY'S VISIT  New Prescriptions    No medications on file          =================================================================    HPI:    Use of : N/A      Sola Akers is a 35 year old female who presents with back pain.    The patient reports she had 4 episodes of intermittent epigastric pain within the past month. She endorses a specific pain to her back and can feel it radiate up. She has a history of acid reflux and takes antacid and TUMS on a regular basis. She took the antacid and TUMS last night without any pain relief. She had 3 vomiting episodes since 10 PM yesterday. She endorses chills. The patient is currently taking Zyrtec and Losartan. No significant abdominal surgeries other than . She does not use tobacco or alcohol.    No complaints of fevers, diarrhea, or any other associated symptoms.      RELEVANT HISTORY, MEDICATIONS, & ALLERGIES   Past medical history, surgical history, family history, medications, and allergies reviewed and pertinent noted in HPI.    REVIEW OF SYSTEMS:  A complete review of systems was performed with pertinent positives and negatives noted in the HPI.     PHYSICAL EXAM:    Vitals: /70   Pulse 76   Temp 98.5  F (36.9  C) (Oral)   Resp 23   Ht 1.524 m (5')   Wt 95.2 kg (209 lb 14.4 oz)   LMP 2025 (Exact Date)   SpO2 97%   BMI 40.99 kg/m     General: Alert and interactive, comfortable appearing.  HENT: Atraumatic. Full AROM of neck. MMM.  Cardiovascular: Regular rate and rhythm.   Chest/Pulmonary: Normal work of breathing. Speaking in complete sentences. Lungs CTAB. No chest wall tenderness or deformities.  Abdomen: Soft, nondistended. TTP epigastric abdomen without  guarding or rebound.  Extremities: Normal AROM of all major joints.  Skin: Warm and dry. Normal skin color.   Neuro: Speech clear. CNs grossly intact. Moves all extremities spontaneously.   Psych: Normal affect/mood, cooperative, memory appropriate.      LAB  Labs Ordered and Resulted from Time of ED Arrival to Time of ED Departure   HEPATIC FUNCTION PANEL - Abnormal       Result Value    Protein Total 6.9      Albumin 4.2      Bilirubin Total 0.6      Alkaline Phosphatase 156 (*)      (*)     ALT 88 (*)     Bilirubin Direct 0.29     BASIC METABOLIC PANEL - Abnormal    Sodium 141      Potassium 3.5      Chloride 102      Carbon Dioxide (CO2) 31 (*)     Anion Gap 8      Urea Nitrogen 11.2      Creatinine 0.70      GFR Estimate >90      Calcium 9.1      Glucose 101 (*)    LIPASE - Normal    Lipase 29     CBC WITH PLATELETS - Normal    WBC Count 6.5      RBC Count 4.33      Hemoglobin 12.3      Hematocrit 37.2      MCV 86      MCH 28.4      MCHC 33.1      RDW 13.0      Platelet Count 279     TROPONIN T, HIGH SENSITIVITY - Normal    Troponin T, High Sensitivity <6     MAGNESIUM - Normal    Magnesium 2.2     D DIMER QUANTITATIVE - Normal    D-Dimer Quantitative 0.30         RADIOLOGY  US Abdomen Limited   Final Result   IMPRESSION:   1.  Cholelithiasis.            XR Chest 2 Views   Final Result   IMPRESSION:   1.  Clear lungs.   2.  No pleural effusion or pneumothorax.   3.  Normal heart size.              EKG  Performed at: 05/25/2025 at 02:14  Impression: Sinus tachycardia.  No acute ischemic changes.  T wave inversion inferior and lateral leads.  Rate: 101 BPM  Rhythm: Sinus tachycardia  QRS Interval: 86 ms  QTc Interval: 422 ms  Comparison: No previous ECGs available    All laboratory and imaging results and EKG's were personally reviewed and interpreted by myself prior to disposition decision.         Nomi CALDERON, am serving as a scribe to document services personally performed by Dr. Janis Levy based on  my observation and the provider's statements to me. I, Janis Levy MD attest that Nomi Piedad is acting in a scribe capacity, has observed my performance of the services and has documented them in accordance with my direction.    Janis Levy M.D.  Emergency Medicine  [facility] EMERGENCY DEPARTMENT  [address] [place] [id number]  [phone]  Dept: [phone]       Janis Levy MD  05/25/25 2638

## 2025-05-25 NOTE — OR NURSING
Patient states that she had an allergic reaction after her  in 2023. She states that it started as a rash over her abdomen and progressed to face, tongue and throat swelling. One of the notes from that admission state that it was thought to be cephalosporin or nifedipine that caused the reaction. Patient states that the reaction happened 5 days after her . [facility] notified.

## 2025-05-25 NOTE — ED TRIAGE NOTES
Pt. Stated that she has been having episodes of severe pain in her upper back that starts dull and then becomes sharp and burning. She stated that the episodes don't usually last for long periods of time and are often decreased by antacid use. Tonight pain is not going away with antacids. Has not taken any pain medications.      Triage Assessment (Adult)       Row Name 05/25/25 0146          Triage Assessment    Airway WDL WDL        Respiratory WDL    Respiratory WDL WDL        Skin Circulation/Temperature WDL    Skin Circulation/Temperature WDL WDL        Cardiac WDL    Cardiac WDL WDL        Peripheral/Neurovascular WDL    Peripheral Neurovascular WDL WDL

## 2025-05-25 NOTE — OP NOTE
Operative Note:  Laparoscopic Cholecystectomy    Name:  Sola Akers  PCP:  Sierra Lyons  Procedure Date:  5/25/2025      Procedure(s):  CHOLECYSTECTOMY, LAPAROSCOPIC     Pre-Procedure Diagnosis:  Cholecystitis    Post-Procedure Diagnosis:    Cholecystits, gall stones in the cystic duct    Surgeon(s):  Ace Arreguin MD      Anesthesia Type:  GET      Findings:  Tiny gall stones in the cystic duct    Operative Report:    The patient was taken to Operating Room, identified, and the procedure verified as Laparoscopic Cholecystectomy  A Time Out was held.    General endotracheal anesthesia was administered and tolerated well. After the induction, the abdomen was prepped in the usual sterile fashion. The patient was positioned in the supine position. They were sterilely prepped and draped in the usual surgical fashion.    Local anesthetic agent was injected into the skin near the umbilicus and an incision made. A periumbilical incision was made and a 5mm trocar was placed under direct visualization using the optiview technique, and a pneumoperitoneum was brought up to 15 mm Hg. . 2 5's and a  11 mm port was placed under direct vision.  All skin incisions were infiltrated with a local anesthetic agent before making the incision and placing the trocars.     The gallbladder was identified, the fundus grasped and retracted cephalad up over the inferior edge of the liver. The infundibulum was grasped and retracted laterally, exposing the neck of the gallbladder.  The visceral peritoneum overlying the angle of Calot was dissected through with hook cautery. The cystic duct was clearly identified and bluntly dissected circumferentially. The junctions of the gallbladder and the cystic duct was clearly identified.  The cystic duct was clipped with one clip on the gallbladder side and 3 clips more proximally on the cystic duct. The Cystic duct was divided sharply with laparoscopic scissors between the clips. The  cystic artery was identified, it was dissected free of surrounding structures.  The cystic artery was clipped and divided.      The gallbladder was dissected from the liver bed in retrograde fashion with the electrocautery. The gallbladder was removed by placing it in a endocatch bag and extracting it from through the 11 mm trocar site.   The fascia of the 10 mm trocar site was then closed with 0 vicryl using an Endo fascial closure device under direct physician laparoscope     Pneumoperitoneum was reduced.  The trocars were removed.  The skin was then closed with 4-0 monocryl and a sterile dressing was applied.    Instrument, sponge, and needle counts were correct at closure and at the conclusion of the case.    Estimated Blood Loss:   5 cc    Specimens:    Gall Bladder       Drains:   None    Complications:    None    Ace Arreguin MD     Date: 5/25/2025  Time: 2:56 PM

## 2025-05-25 NOTE — MEDICATION SCRIBE - ADMISSION MEDICATION HISTORY
Medication Scribe Admission Medication History    Admission medication history is complete. The information provided in this note is only as accurate as the sources available at the time of the update.    Information Source(s): Patient via in-person    Pertinent Information: PTA med list updated per patient.  Patient states that she is taking Semaglutied 10 unints/2.5 mg injection every three days and the last administered on Thursday. There are not Rx fill activity or order in either sure script of on care everywhere to confirm. The only record indicates the name of the drug is on care everywhere on progress note from 4/28/2025 on [facility] .    Taking the compounded semaglutide. Doing more micro dosing which has alleviated a lot of her nausea and vomiting. Is compounded through [facility]. We did discuss that with the medication being taken off the short supply list that we may need to look for alternatives. She .       Changes made to PTA medication list:  Added: Losartan-hydrochlorothiazide(per patient and fill history)  Deleted: Ferrous sulfate, Hydralazine, Hydrocortisone, Ibuprofen, Labetalol, Levothyroxine, Magnesium oxide, Prednisone, Prenatal Vitamin (per patient)  Changed: None    Allergies reviewed with patient and updates made in EHR: yes    Medication History Completed By: Leilani Abraham 5/25/2025 5:38 AM    PTA Med List   Medication Sig Last Dose/Taking    cetirizine (ZYRTEC) 10 MG tablet Take 10 mg by mouth daily 5/24/2025 Evening    losartan-hydrochlorothiazide (HYZAAR) 50-12.5 MG tablet Take 1 tablet by mouth daily at 2 pm. 5/24/2025 Evening

## 2025-05-25 NOTE — CONSULTS
General surgery consult         ASSESSMENT   Cholecystitis  1. Symptomatic cholelithiasis    2. Elevated LFTs    3. Upper abdominal pain    4. Nausea and vomiting, unspecified vomiting type             PLAN      Laparoscopic cholecystectomy         CHIEF COMPLAINT     Chief Complaint   Patient presents with    Back Pain         HPI     Sola Akers is a 35 year old female who presents with an acute onset of abdominal pain that started yesterday.  She has been having intermittent epigastric abdominal pain episodes over the course of the last few months but yesterday's was much more intense than the usual and she came in to the emergency department to be evaluated.         PAST MEDICAL HISTORY SURGICAL HISTORY     Past Medical History:   Diagnosis Date    Closed dislocation of patella 2021    Formatting of this note might be different from the original. Created by [facility] Annotation: Aug 31 2007  6:22PM - Elvira Christie: arthroscopy     Gestational hypertension     PCOS (polycystic ovarian syndrome)     Pre-eclampsia in postpartum period 2021    Thyroid disease     Past Surgical History:   Procedure Laterality Date    AS KNEE SCOPE, DIAGNOSTIC       SECTION N/A 2021    Procedure:  SECTION;  Surgeon: Elvira Bustos DO;  Location: [facility] OR    COMBINED  SECTION, SALPINGECTOMY BILATERAL Bilateral 10/5/2023    Procedure: REPEAT  SECTION WITH BILATERAL SALPINGECTOMY;  Surgeon: Elvira Bustos DO;  Location: [facility] OR          CURRENT MEDICATIONS ALLERGIES     No current outpatient medications on file.    Allergies   Allergen Reactions    Bee Venom Anaphylaxis    Seafood Anaphylaxis    Kiwi [Kiwi Extract] Hives    Sulfa (Sulfonamide Antibiotics) [Sulfa Antibiotics] Unknown    Venom-Honey Bee [Bee Venom] Unknown    Penicillins Rash    Sulfa Antibiotics Rash          FAMILY HISTORY   No family history on file.      SOCIAL HISTORY  "    Social History     Socioeconomic History    Marital status:    Tobacco Use    Smoking status: Never    Smokeless tobacco: Never   Substance and Sexual Activity    Alcohol use: Not Currently     Comment: Alcoholic Drinks/day: occ prior to pregnancy    Drug use: Never    Sexual activity: Yes     Partners: Male   Social History Narrative    ** Merged History Encounter **          Social Drivers of Health     Financial Resource Strain: Low Risk  (5/25/2025)    Financial Resource Strain     Within the past 12 months, have you or your family members you live with been unable to get utilities (heat, electricity) when it was really needed?: No   Food Insecurity: Low Risk  (5/25/2025)    Food Insecurity     Within the past 12 months, did you worry that your food would run out before you got money to buy more?: No     Within the past 12 months, did the food you bought just not last and you didn t have money to get more?: No   Transportation Needs: Low Risk  (5/25/2025)    Transportation Needs     Within the past 12 months, has lack of transportation kept you from medical appointments, getting your medicines, non-medical meetings or appointments, work, or from getting things that you need?: No   Housing Stability: Low Risk  (5/25/2025)    Housing Stability     Do you have housing? : Yes     Are you worried about losing your housing?: No         REVIEW OF SYSTEMS       12 point review of systems are unremarkable except for the symptoms described in the HPI    PHYSICAL EXAM     VITAL SIGNS: /81 (BP Location: Left arm)   Pulse 72   Temp 97.9  F (36.6  C) (Oral)   Resp 18   Ht 1.549 m (5' 1\")   Wt 96 kg (211 lb 10.3 oz)   LMP 05/23/2025 (Exact Date)   SpO2 98%   BMI 39.99 kg/m     General : Alert, cooperative, appears stated age   Skin: Skin color, texture, turgor normal, no rashes or lesions   Lymph nodes: No obvious adenopathy   Head: Normocephalic, without obvious abnormality, atraumatic   HEENT:  " conjunctiva/corneas clear, EOM's intact, no scleral icterus   Throat: Lips, mucosa, and tongue normal; teeth and gums normal    Neck: Supple, thyroid not enlarged   Back: No CVA tenderness   Lungs: Clear to auscultation bilaterally, respirations unlabored, no rales, rhonchi or wheezes   Chest Wall:No tenderness or deformity   Heart: Regular rate and rhythm, S1, S2 normal, no murmur, rub or gallop   Abdomen: Soft, mildly tender to palpation in the right upper quadrant, no guarding, + bowel sounds active,   Extremities : No obvious swelling,  Neurologic: Cranial Nerves II-XII normal, moves all extremities equally, no focal findings          RADIOLOGY      US Abdomen Limited   Final Result   IMPRESSION:   1.  Cholelithiasis.            XR Chest 2 Views   Final Result   IMPRESSION:   1.  Clear lungs.   2.  No pleural effusion or pneumothorax.   3.  Normal heart size.              EKG     Reviewed        LABS     Results for orders placed or performed during the hospital encounter of 05/25/25   US Abdomen Limited    Impression    IMPRESSION:  1.  Cholelithiasis.       XR Chest 2 Views    Impression    IMPRESSION:  1.  Clear lungs.  2.  No pleural effusion or pneumothorax.  3.  Normal heart size.      Hepatic function panel   Result Value Ref Range    Protein Total 6.9 6.4 - 8.3 g/dL    Albumin 4.2 3.5 - 5.2 g/dL    Bilirubin Total 0.6 <=1.2 mg/dL    Alkaline Phosphatase 156 (H) 40 - 150 U/L     (H) 0 - 45 U/L    ALT 88 (H) 0 - 50 U/L    Bilirubin Direct 0.29 0.00 - 0.30 mg/dL   Result Value Ref Range    Lipase 29 13 - 60 U/L   Basic metabolic panel   Result Value Ref Range    Sodium 141 135 - 145 mmol/L    Potassium 3.5 3.4 - 5.3 mmol/L    Chloride 102 98 - 107 mmol/L    Carbon Dioxide (CO2) 31 (H) 22 - 29 mmol/L    Anion Gap 8 7 - 15 mmol/L    Urea Nitrogen 11.2 6.0 - 20.0 mg/dL    Creatinine 0.70 0.51 - 0.95 mg/dL    GFR Estimate >90 >60 mL/min/1.73m2    Calcium 9.1 8.8 - 10.4 mg/dL    Glucose 101 (H) 70 - 99  mg/dL   CBC (+ platelets, no diff)   Result Value Ref Range    WBC Count 6.5 4.0 - 11.0 10e3/uL    RBC Count 4.33 3.80 - 5.20 10e6/uL    Hemoglobin 12.3 11.7 - 15.7 g/dL    Hematocrit 37.2 35.0 - 47.0 %    MCV 86 78 - 100 fL    MCH 28.4 26.5 - 33.0 pg    MCHC 33.1 31.5 - 36.5 g/dL    RDW 13.0 10.0 - 15.0 %    Platelet Count 279 150 - 450 10e3/uL   Result Value Ref Range    Troponin T, High Sensitivity <6 <=14 ng/L   Result Value Ref Range    Magnesium 2.2 1.7 - 2.3 mg/dL   D dimer quantitative   Result Value Ref Range    D-Dimer Quantitative 0.30 0.00 - 0.50 ug/mL FEU   ECG 12-LEAD WITH MUSE (L[facility])   Result Value Ref Range    Systolic Blood Pressure  mmHg    Diastolic Blood Pressure  mmHg    Ventricular Rate 101 BPM    Atrial Rate 101 BPM    KS Interval 172 ms    QRS Duration 86 ms     ms    QTc 422 ms    P Axis 62 degrees    R AXIS 95 degrees    T Axis -43 degrees    Interpretation ECG       Sinus tachycardia  Rightward axis  Septal infarct , age undetermined  T wave abnormality, consider inferior ischemia  Abnormal ECG  No previous ECGs available  Confirmed by SEE ED PROVIDER NOTE FOR, ECG INTERPRETATION ([id number]),  TRACY SOLANO ([id number]) on 5/25/2025 3:11:46 AM             [facility]  [phone]

## 2025-05-25 NOTE — ANESTHESIA PROCEDURE NOTES
Airway       Patient location during procedure: OR       Procedure Start/Stop Times: 5/25/2025 1:56 PM  Staff -        CRNA: Jose D Treviño APRN CRNA       Performed By: CRNAIndications and Patient Condition       Indications for airway management: davon-procedural       Induction type:intravenous       Mask difficulty assessment: 1 - vent by mask    Final Airway Details       Final airway type: endotracheal airway       Successful airway: ETT - single  Endotracheal Airway Details        ETT size (mm): 7.0       Successful intubation technique: direct laryngoscopy       DL Blade Type: Estrella 2       Grade View of Cords: 1       Adjucts: stylet       Position: Right       Measured from: gums/teeth       Secured at (cm): 22    Post intubation assessment        Placement verified by: capnometry, equal breath sounds and chest rise        Number of attempts at approach: 1       Number of other approaches attempted: 0       Secured with: tape       Ease of procedure: easy       Dentition: Intact    Medication(s) Administered   Medication Administration Time: 5/25/2025 1:56 PM

## 2025-05-25 NOTE — ED NOTES
[facility] ED Handoff Report    ED Chief Complaint: epigastric pain/ nausea/ vomiting    ED Diagnosis:  (K80.20) Symptomatic cholelithiasis    (R79.89) Elevated LFTs    (R10.10) Upper abdominal pain    (R11.2) Nausea and vomiting, unspecified vomiting type       PMH:    Past Medical History:   Diagnosis Date    Closed dislocation of patella 08/03/2021    Formatting of this note might be different from the original. Created by [facility] Annotation: Aug 31 2007  6:22PM - Elvira Christie: arthroscopy 2/07    Gestational hypertension     PCOS (polycystic ovarian syndrome)     Pre-eclampsia in postpartum period 07/17/2021    Thyroid disease         Code Status:  Full Code     Falls Risk: No Band: Not applicable    Current Living Situation/Residence: lives in a house     Elimination Status: Continent: Yes     Activity Level: Independent    Patients Preferred Language:  English     Needed: No    Vital Signs:  /68   Pulse 76   Temp 98.1  F (36.7  C) (Oral)   Resp 18   Ht 1.524 m (5')   Wt 95.2 kg (209 lb 14.4 oz)   LMP 05/23/2025 (Exact Date)   SpO2 98%   BMI 40.99 kg/m       Cardiac Rhythm: not monitored    Pain Score: 2/10    Is the Patient Confused:  No    Last Food or Drink: 5/24/25     Focused Assessment:  Patient comes with c/o intermittent upper abdominal/ chest pain for past month, with episodes of nausea/ vomiting. Worse last evening.     Tests Performed: Done: Labs and Imaging    Treatments Provided:  iv fluids, antibiotics    Family Dynamics/Concerns: No    Family Updated On Visitor Policy: No    Plan of Care Communicated to Family: Yes    Who Was Updated about Plan of Care:      Belongings Checklist Done and Signed by Patient: No    Belongings Sent with Patient: clothing    Medications sent with patient: none    Covid: asymptomatic , not tested      RN: France Bagley RN 5/25/2025 8:12 AM

## 2025-05-25 NOTE — ANESTHESIA PREPROCEDURE EVALUATION
Anesthesia Pre-Procedure Evaluation    Patient: Sola Akers   MRN: [id number] : 1990          Procedure : Procedure(s):  CHOLECYSTECTOMY, LAPAROSCOPIC         Past Medical History:   Diagnosis Date    Closed dislocation of patella 2021    Formatting of this note might be different from the original. Created by [employer] Annotation: Aug 31 2007  6:22PM - Elvira Christie: arthroscopy     Gestational hypertension     PCOS (polycystic ovarian syndrome)     Pre-eclampsia in postpartum period 2021    Thyroid disease       Past Surgical History:   Procedure Laterality Date    AS KNEE SCOPE, DIAGNOSTIC       SECTION N/A 2021    Procedure:  SECTION;  Surgeon: Elvira Bustos DO;  Location: [facility] OR    COMBINED  SECTION, SALPINGECTOMY BILATERAL Bilateral 10/5/2023    Procedure: REPEAT  SECTION WITH BILATERAL SALPINGECTOMY;  Surgeon: Elvira Bustos DO;  Location: [facility] OR      Allergies   Allergen Reactions    Bee Venom Anaphylaxis    Seafood Anaphylaxis    Kiwi [Kiwi Extract] Hives    Sulfa (Sulfonamide Antibiotics) [Sulfa Antibiotics] Unknown    Venom-Honey Bee [Bee Venom] Unknown    Penicillins Rash    Sulfa Antibiotics Rash      Social History     Tobacco Use    Smoking status: Never    Smokeless tobacco: Never   Substance Use Topics    Alcohol use: Not Currently     Comment: Alcoholic Drinks/day: occ prior to pregnancy      Wt Readings from Last 1 Encounters:   25 96 kg (211 lb 10.3 oz)        Anesthesia Evaluation   Pt has had prior anesthetic.     No history of anesthetic complications       ROS/MED HX  ENT/Pulmonary:  - neg pulmonary ROS     Neurologic:  - neg neurologic ROS     Cardiovascular:     (+)  hypertension- -   -  - -                                      METS/Exercise Tolerance:     Hematologic:  - neg hematologic  ROS     Musculoskeletal:  - neg musculoskeletal ROS     GI/Hepatic:     (+)           "cholecystitis/cholelithiasis,          Renal/Genitourinary:  - neg Renal ROS     Endo:     (+)               Obesity,       Psychiatric/Substance Use:  - neg psychiatric ROS     Infectious Disease:  - neg infectious disease ROS     Malignancy:  - neg malignancy ROS     Other:  - neg other ROS            Physical Exam  Airway  Mallampati: III  TM distance: >3 FB  Neck ROM: full  Mouth opening: >= 4 cm    Cardiovascular - normal exam   Dental   (+) Minor Abnormalities - some fillings, tiny chips      Pulmonary - normal exam      Neurological - normal exam  She appears awake, alert and oriented x3.    Other Findings       OUTSIDE LABS:  CBC:   Lab Results   Component Value Date    WBC 6.5 05/25/2025    WBC 7.1 10/11/2023    HGB 12.3 05/25/2025    HGB 10.5 (L) 10/11/2023    HCT 37.2 05/25/2025    HCT 32.0 (L) 10/11/2023     05/25/2025     10/11/2023     BMP:   Lab Results   Component Value Date     05/25/2025     10/11/2023    POTASSIUM 3.5 05/25/2025    POTASSIUM 4.0 10/11/2023    CHLORIDE 102 05/25/2025    CHLORIDE 106 10/11/2023    CO2 31 (H) 05/25/2025    CO2 24 10/11/2023    BUN 11.2 05/25/2025    BUN 7.8 10/11/2023    CR 0.70 05/25/2025    CR 0.72 10/11/2023     (H) 05/25/2025     (H) 10/11/2023     COAGS:   Lab Results   Component Value Date    PTT 27 07/13/2021    INR 1.02 07/13/2021     POC: No results found for: \"BGM\", \"HCG\", \"HCGS\"  HEPATIC:   Lab Results   Component Value Date    ALBUMIN 4.2 05/25/2025    PROTTOTAL 6.9 05/25/2025    ALT 88 (H) 05/25/2025     (H) 05/25/2025    ALKPHOS 156 (H) 05/25/2025    BILITOTAL 0.6 05/25/2025     OTHER:   Lab Results   Component Value Date    LACT 2.1 (H) 07/14/2021    ARLEEN 9.1 05/25/2025    MAG 2.2 05/25/2025    LIPASE 29 05/25/2025    TSH 2.52 05/04/2022       Anesthesia Plan    ASA Status:  2, emergent      NPO Status: NPO Appropriate   Anesthesia Type: General.  Airway: oral.  Induction: intravenous.  Maintenance: " "Balanced.   Techniques and Equipment:       - Monitoring Plan: standard ASA monitoring     Consents    Anesthesia Plan(s) and associated risks, benefits, and realistic alternatives discussed. Questions answered and patient/representative(s) expressed understanding.     - Discussed: CRNA     - Discussed with:  Patient        - Pt is DNR/DNI Status: no DNR          Postoperative Care    Pain management: non-narcotic analgesics, plan for postoperative opioid use, multimodal analgesia.     Comments:                   Katiuska Martinez MD    I have reviewed the pertinent notes and labs in the chart from the past 30 days and (re)examined the patient.  Any updates or changes from those notes are reflected in this note.    Clinically Significant Risk Factors Present on Admission                   # Hypertension: Noted on problem list           # Obesity: Estimated body mass index is 39.99 kg/m  as calculated from the following:    Height as of this encounter: 1.549 m (5' 1\").    Weight as of this encounter: 96 kg (211 lb 10.3 oz).                    "

## 2025-05-25 NOTE — PLAN OF CARE
Patient discharged to home at 5:26 PM  via Private Car.  Accompanied by spouse and staff.  Discharge instructions were reviewed with patient and spouse, opportunity offered to ask questions.    Prescriptions e-prescribed to pharmacy.  Access discontinued: Yes  Care plan and education discontinued: Yes  Home meds retrieved from pharmacy: N/A  Belongings were sent home with patient/family:  Clothing, cell phone, purse/wallet.

## 2025-05-25 NOTE — SUMMARY OF CARE
Patient admitted to room 15 at approximately 08:33AM via wheel chair from emergency room.    Patient ambulated/transferred:  independently. self.    Detailed List of Belongings (be very specific listing out each item):     Clothes, phone, purse and phone bank

## 2025-05-25 NOTE — PROGRESS NOTES
[facility]    Progress Note - Hospitalist Service       Date of Admission:  5/25/2025    Assessment & Plan   Sola Akers is a 35 year old female admitted on 5/25/2025. She has a history of HTN and PCOS and is admitted for symptomatic cholelithiasis.     Changes 5/25:  - Pain significantly improved  -Saw patient in general surgery today and they will try and get her in in the early afternoon for removal of gallbladder  -She will be excused from work for 1 week  -Preoperative clearance as below     Symptomatic cholelithiasis   Elevated LFTs   Sola is a 35 y.o. female presenting with and acute episode of back pain and epigastric pain on the evening of 5/24. She has had several intermittent episodes of back pain with associated nausea and vomiting but noted the evening prior to admission that this pain was much worse and associated with more significant nausea and vomiting. She had previously taken TUMS with some improvement in her symptoms but this did not work with the acute worsening of her pain. She is on a GLP-1 and was concerned about gallbladder pathology which prompted her ED evaluation. Labs in the ED notable for elevation in ALT, AST, and alk phos. Normal lipase. WBC was WNL and electrolytes WNL. Cardiac workup in the setting of CP was unremarkable with negative trop. ECG demonstrating sinus tachycardia. CXR unremarkable. US of the RUQ obtained showing cholelithiasis without evidence of cholecystitis. She did receive a single dose of  Zosyn in the ED as well as 1L bolus NS. Surgery consulted in the ED and agreed with pursuing cholecystectomy.  Likely cholecystectomy on 5/25    - Gen surgery consulted    - Pursuing cholecystectomy this afternoon, 5/25  - NPO  - Pain control with Tylenol and ibuprofen. May add additional agent if needed.    - Nausea medications   - Monitor LFTs      Chronic conditions:   HTN: PTA Losartan-hydrochlorothiazide  Allergies: hold PTA zyrtec    "  Preop clearance: Patient's risk factors include HTN, HLD, DM, History of MI, and smoking history. EKG shows sinus tach. Patient's functional capacity is Excellent: >7 METS: Jogging (10 minute mile), scrubbing floors, tennis  Revised Cardiac Risk Index for Pre-Operative Risk  High risk surgery? No  History of ischemic heart disease? No  History of CHF? No  History of cerebrovascular disease? No  Pre-op treatment w/ insulin? No  Pre-op creatinine > 2.0? No     0 points = class I risk 0.4% risk of major cardiac event        Observation Goals: -diagnostic tests and consults completed and resulted, -vital signs normal or at patient baseline, -tolerating oral intake to maintain hydration, -adequate pain control on oral analgesics, Nurse to notify provider when observation goals have been met and patient is ready for discharge.  Diet: NPO for Medical/Clinical Reasons Except for: Meds    DVT Prophylaxis: Pneumatic Compression Devices  Hooks Catheter: Not present  Fluids: mIVF  Lines: None     Cardiac Monitoring: None  Code Status: Full Code      Clinically Significant Risk Factors Present on Admission                   # Hypertension: Noted on problem list           # Obesity: Estimated body mass index is 39.99 kg/m  as calculated from the following:    Height as of this encounter: 1.549 m (5' 1\").    Weight as of this encounter: 96 kg (211 lb 10.3 oz).              Social Drivers of Health          Disposition Plan     Medically Ready for Discharge: Anticipated Tomorrow         The patient's care was discussed with the Attending Physician, Dr. Kumar.    Sandro Chiu MD  Hospitalist Service  [facility]  Securely message with [employer] (more info)  Text page via [employer] Paging/Directory   ______________________________________________________________________    Interval History   Pain more manageable overnight.  Has been kept n.p.o. for potential surgery.  Saw patient with general surgery today.  She " describes colicky type abdominal pain over 4 instances along with nonbilious vomiting x 4 last month.  This last 1 was more unrelenting and continued to vomit so she came in.  General surgery is recommending cholecystectomy and we will pursue that.    Physical Exam   Vital Signs: Temp: 97.9  F (36.6  C) Temp src: Oral BP: 116/81 Pulse: 72   Resp: 18 SpO2: 98 % O2 Device: None (Room air)    Weight: 211 lbs 10.27 oz  GENERAL: Healthy, alert and no distress  EYES: Eyes grossly normal to inspection.  No discharge or erythema, or obvious scleral/conjunctival abnormalities.  RESP:  Lungs clear throughout. No wheeze or crackles.   CV: Heart RRR. No murmur  Abdomen: Soft, mild tenderness in epigastric region to deep palpation, otherwise no tenderness or distention  MSK: No gross deformity. Normal tone.  SKIN: Visible skin clear. No significant rash, abnormal pigmentation or lesions.  NEURO: Cranial nerves grossly intact.  Mentation and speech appropriate for age.  PSYCH: Mentation appears normal, affect normal/bright, judgement and insight intact, normal speech and appearance well-groomed.      Medical Decision Making       Please see A&P for additional details of medical decision making.      Data   ------------------------- PAST 24 HR DATA REVIEWED -----------------------------------------------    I have personally reviewed the following data over the past 24 hrs:    6.5  \   12.3   / 279     141 102 11.2 /  101 (H)   3.5 31 (H) 0.70 \     ALT: 88 (H) AST: 151 (H) AP: 156 (H) TBILI: 0.6   ALB: 4.2 TOT PROTEIN: 6.9 LIPASE: 29     Trop: <6 BNP: N/A     INR:  N/A PTT:  N/A   D-dimer:  0.30 Fibrinogen:  N/A       Imaging results reviewed over the past 24 hrs:   Recent Results (from the past 24 hours)   XR Chest 2 Views    Narrative    EXAM: XR CHEST 2 VIEWS  LOCATION: [facility]  DATE: 5/25/2025    INDICATION: mid back and chest pain  COMPARISON: None.      Impression    IMPRESSION:  1.  Clear  lungs.  2.  No pleural effusion or pneumothorax.  3.  Normal heart size.      US Abdomen Limited    Narrative    EXAM: US ABDOMEN LIMITED  LOCATION: [facility]  DATE: 5/25/2025    INDICATION: upper abdominal pain, elevated LFTs  COMPARISON: 10/9/2023  TECHNIQUE: Limited abdominal ultrasound.    FINDINGS:    GALLBLADDER: Gallstones. No wall thickening, or pericholecystic fluid. Negative sonographic Nolasco's sign. There are again multiple areas with comet tail artifact in the gallbladder wall consistent with cholesterolosis.    BILE DUCTS: No biliary dilatation. The common duct measures 3 mm.    LIVER: Normal parenchyma with smooth contour. No focal mass. The portal vein is patent with flow in the normal direction.    RIGHT KIDNEY: No hydronephrosis.    PANCREAS: The visualized portions are normal.    No ascites.      Impression    IMPRESSION:  1.  Cholelithiasis.

## 2025-05-25 NOTE — DISCHARGE INSTRUCTIONS
From your General Surgery Team:    Follow up:  For straightforward laparoscopic procedures, our practice is to check-in with you over the phone a few days after your procedure.  If you would like a scheduled follow up appointment in clinic, please call us at [phone] to schedule an appointment at your convenience.  If you would prefer to follow up with us further by phone, please let us know so that we may contact you 2-3 weeks following your procedure.        Diet: Regular diet. Patients can have difficulty with constipation following surgery, due in part to the administration of narcotic pain medications.  If you are suffering with constipation, you should avoid foods such as hard cheeses or red meat.  Foods high in fiber are recommended.      Activity: You should continue to be active at home, including getting up and walking frequently.  If possible, limit the amount of time spent in bed.    Restrictions: You should not lift greater than 20 pounds for 2 weeks, and will want to avoid strenuous physical activity for 1-2 weeks.  You should limit your physical activity if it causes you discomfort; however, this should resolve within 1-2 weeks.   Walking does not count as strenuous physical activity and you are safe to walk up and down stairs.  Following 2 weeks you may resume normal physical activity.    Work:  You can return to work once your surgical pain has resolved.  If you perform duties that require lifting, pushing or pulling anything greater than 15 pounds, you should be on light duty for the immediate 2 weeks after your surgery (if your work allows light duty).  After 2 weeks, you can return to work without restrictions.    Wound care: Your incisions are covered with sterile dressings that should be removed after 48 hours. If there is drainage pooling under the bandage, change dressing prior to 48 hours. After 48 hours, no further dressing needed.  It is normal to have a small rim of red present around  the incisions. This should not, however, extend beyond 1/4 inch from the incision.  If your incisions become increasingly tender, red, or draining, please contact us.       Bathing: You may shower after 24 hours from surgery.  It is ok to get your incisions wet, but avoid rubbing them.  Avoid soaking in bath tubs or swimming in lakes, pools, or streams for 2 weeks following surgery.     PAIN:  Some pain is expected after surgery. This will improve over time. After laparoscopic surgery, some people experience shoulder pain on the first day after surgery. This is from the gas used to inflate the abdomen during the surgery. This sensation usually improves within 48 hours. I recommend using Tylenol 1000 mg every 6 hours and ibuprofen 400-600 mg every 6 hours for your primary pain control. Alternate between the two medications, taking one every three hours. Example schedule below.    9 AM  - Tylenol 1000 mg   12 PM - Ibuprofen 400-600 mg  3 PM - Tylenol 1000 mg   6 PM - Ibuprofen 400-600 mg    Take these medications scheduled for 3 days. Do not exceed the maximum dosage amount over 24 hours as recommended on the medication bottle.   Take the prescribed narcotic medications only if needed in addition to medications above.     Narcotic medications can cause constipation. If you are struggling with constipation, we recommend taking Miralax once daily or stool softener combo (such as Dulcolax or Senna) to help easily pass stool. Do not take these medications if you are having diarrhea or are sensitive / allergic to them.    For bowel regimen we would recommend maintaining an active lifestyle and drinking plenty of water as you are able. For supplements we would recommend using Miralax (also known as polyethylene glycol) daily. If not producing bowel movement with this can add on Senna or Dulcolax (stool softener / laxatives which can be found over the counter). If you are producing stools but they are harder / round small  stools replace the above recommendations with just Colace (also known as docusate sodium which is a stool softener).     Hold these medications if you have loose stools / diarrhea. Do not take these medications if you have allergies or sensitivities to any of the components.   If you are having regular bowel movements you may try stopping these medications. You can try daily fiber intake in foods or supplementing psyllium husk (Bourbon Mucil) or Benefiber or Citracel to maintain regularity.

## 2025-05-26 NOTE — DISCHARGE SUMMARY
"[facility]  Discharge Summary - Medicine & Pediatrics       Date of Admission:  5/25/2025  Date of Discharge:  5/25/2025  5:29 PM  Discharging Provider: Sandro Chiu MD, Dr. Kumar  Discharge Service: Hospitalist Service    Discharge Diagnoses   Symptomatic Cholecystitis s/p cholecystectomy   Elevated liver function tests    Clinically Significant Risk Factors     # Obesity: Estimated body mass index is 39.99 kg/m  as calculated from the following:    Height as of this encounter: 1.549 m (5' 1\").    Weight as of this encounter: 96 kg (211 lb 10.3 oz).       Follow-ups Needed After Discharge   Follow-up Appointments       Hospital Follow-up with Existing Primary Care Provider (PCP)          Schedule Primary Care visit within: 14 Days           NTD    Unresulted Labs Ordered in the Past 30 Days of this Admission       No orders found for last 31 day(s).        These results will be followed up by PCP    Discharge Disposition   Discharged to home  Condition at discharge: Stable    Hospital Course   Sola Akers was admitted on 5/25/2025 for biliary colic from cholelithiasis s/p cholecystectomy.  The following problems were addressed during her hospitalization:    Cholecystitis s/p cholecystectomy   Elevated LFTs   Sola is a 35 y.o. female presenting with and acute episode of back pain and epigastric pain on the evening of 5/24. She has had several intermittent episodes of back pain with associated nausea and vomiting but noted the evening prior to admission that this pain was much worse and associated with more significant nausea and vomiting. Labs in the ED notable for elevation in ALT, AST, and alk phos. US of the RUQ obtained showing cholelithiasis. Cholecystectomy on 5/25 afternoon without complication   - Gen surgery consulted               - cholecystectomy 5/25    Consultations This Hospital Stay   SURGERY GENERAL IP CONSULT    Code Status   Prior       The patient was discussed " with Dr. José Chiu MD  [place] [facility]  [address] [place] [id number]  Phone: [phone]  Fax: [phone]  ______________________________________________________________________    Physical Exam   Vital Signs: Temp: 97.9  F (36.6  C) Temp src: Oral BP: (!) 146/87 Pulse: 72   Resp: 20 SpO2: 99 % O2 Device: None (Room air)    Weight: 211 lbs 10.27 oz  GENERAL: Healthy, alert and no distress  EYES: Eyes grossly normal to inspection.  No discharge or erythema, or obvious scleral/conjunctival abnormalities.  RESP:  Lungs clear throughout. No wheeze or crackles.   CV: Heart RRR. No murmur  Abdomen: Soft, mild tenderness in epigastric region to deep palpation, otherwise no tenderness or distention  MSK: No gross deformity. Normal tone.  SKIN: Visible skin clear. No significant rash, abnormal pigmentation or lesions.  NEURO: Cranial nerves grossly intact.  Mentation and speech appropriate for age.  PSYCH: Mentation appears normal, affect normal/bright, judgement and insight intact, normal speech and appearance well-groomed.      Primary Care Physician   Sierra Morocho    Discharge Orders      When to call - Contact Surgeon Team    You may experience symptoms that require follow-up before your scheduled appointment. Contact your Surgeon Team if you are concerned about pain control, large amount of bleeding, blood clots, constipation, or if you experience signs of infection (fever, growing tenderness at the surgery site, a large amount of drainage, severe pain, foul-smelling drainage, redness or swelling.     When to call - Reach out to Urgent Care    If you are experiencing uncontrolled Nausea and Vomiting, uncontrolled pain, inability to urinate and uncomfortable, and in need of immediate care, and you are NOT able to reach your Surgeon Team, go to an Urgent Care clinic. Do NOT go to the Emergency Room unless you have  shortness of breath, chest pain, or other signs of a medical emergency.     When to call - Reasons to Call 911    Call 911 immediately if you experience sudden-onset chest pain, arm weakness/numbness, slurred speech, or shortness of breath     Symptoms - Fever Management    A low grade fever can be expected after surgery. Your Provider many have prescribed an Opioid pain medication that also contains acetaminophen (TYLENOL) that may help with Fever management.  Do NOT take additional acetaminophen (TYLENOL) in combination with an Opioid/acetaminophen (TYLENOL) product. Read the labels on your Over The Counter (OTC) medications with care.     Symptoms - Reduced Urine Output    If it has been greater than 8 hours since you have urinated despite drinking plenty of water, call your Surgeon Team.     No driving or operating machinery    Do NOT drive any vehicle or operate mechanical equipment for 24 hours following the end of your surgery.  Even though you may feel normal, your reactions may be affected by Anesthesia medication you received.     No Alcohol    Do NOT drink alcoholic beverages for 24 hours following your surgery and while taking pain medications.     Diet Instructions    Follow your surgeon's orders for any diet restrictions.  If you did not receive any diet restrictions, you may drink clear liquids (apple juice, ginger ale, 7-up, broth, etc.), and progress to your regular diet as you feel able. It is important to stay well-hydrated after surgery and drink plenty of water.     Shower/Bathing - No restrictions, may shower after 24 hours    Shower/Bathing - No restrictions, may shower after 24 hours.     Dressing / Wound Care - Wound    You have a clean dressing on your surgical wound. Dressing change instructions as follows:   1.  Remove dressings in 2 days  2.  It is okay to shower starting tomorrow  3.  Do not soak in a tub for 1 week   Contact your Surgeon Team if you have increased redness, warmth  around the surgical wound, and/or drainage from the surgical wound.     Discharge Instructions - Comfort and Pain Management    Pain after surgery is normal and expected. You will have some amount of pain after surgery. Your pain will improve with time. There are several things you can do to help reduce your pain including: rest, ice, and using pain medications as needed. Use pain interventions and don't wait until pain level is out of control. Contact your Surgeon Team if you have pain that persists or worsens after surgery despite rest, ice, and taking your medication(s) as prescribed. You may have a dry mouth, a sore throat, muscles aches or trouble sleeping, and these symptoms should go away after 24 hours.     Discharge Instructions - Rest    Rest and relax for the next 24 hours. Make arrangements to have someone stay with you overnight, and avoid hazardous and strenuous activities.  Do NOT make any important decisions for the next 24 hours.     Return to normal activity as tolerated    Return to normal activity as tolerated     May return to work (WITHOUT restrictions)    May return to work in 1 week(s) with NO restrictions.     Reason for your hospital stay    You were hospitalized for your abdominal pain, found to have gallstones. Your gallbladder was removed and you are safe to return home.     Activity    Your activity upon discharge: activity as tolerated     Diet    Follow this diet upon discharge: Current Diet:None     Hospital Follow-up with Existing Primary Care Provider (PCP)            Significant Results and Procedures   Most Recent 3 CBC's:  Recent Labs   Lab Test 05/25/25  0224 10/11/23  0635 10/10/23  0716 10/09/23  0610   WBC 6.5 7.1  --  5.8   HGB 12.3 10.5* 10.7* 10.9*   MCV 86 93  --  94    238 237 246     Most Recent 3 BMP's:  Recent Labs   Lab Test 05/25/25  0224 10/11/23  0635 10/10/23  0716 10/09/23  1837    141  --  134*   POTASSIUM 3.5 4.0  --  3.9   CHLORIDE 102 106  --   102   CO2 31* 24  --  23   BUN 11.2 7.8  --  4.9*   CR 0.70 0.72 0.72 0.67   ANIONGAP 8 11  --  9   ARLEEN 9.1 8.5*  --  8.9   * 116*  --  117*     Most Recent 2 LFT's:  Recent Labs   Lab Test 05/25/25  0224 10/11/23  0635   * 29   ALT 88* 61*   ALKPHOS 156* 134*   BILITOTAL 0.6 0.2   ,   Results for orders placed or performed during the hospital encounter of 05/25/25   US Abdomen Limited    Narrative    EXAM: US ABDOMEN LIMITED  LOCATION: [facility]  DATE: 5/25/2025    INDICATION: upper abdominal pain, elevated LFTs  COMPARISON: 10/9/2023  TECHNIQUE: Limited abdominal ultrasound.    FINDINGS:    GALLBLADDER: Gallstones. No wall thickening, or pericholecystic fluid. Negative sonographic Nolasco's sign. There are again multiple areas with comet tail artifact in the gallbladder wall consistent with cholesterolosis.    BILE DUCTS: No biliary dilatation. The common duct measures 3 mm.    LIVER: Normal parenchyma with smooth contour. No focal mass. The portal vein is patent with flow in the normal direction.    RIGHT KIDNEY: No hydronephrosis.    PANCREAS: The visualized portions are normal.    No ascites.      Impression    IMPRESSION:  1.  Cholelithiasis.       XR Chest 2 Views    Narrative    EXAM: XR CHEST 2 VIEWS  LOCATION: [facility]  DATE: 5/25/2025    INDICATION: mid back and chest pain  COMPARISON: None.      Impression    IMPRESSION:  1.  Clear lungs.  2.  No pleural effusion or pneumothorax.  3.  Normal heart size.          Discharge Medications   Discharge Medication List as of 5/25/2025  5:01 PM        START taking these medications    Details   acetaminophen (TYLENOL) 325 MG tablet Take 2 tablets (650 mg) by mouth every 4 hours as needed for mild pain., Disp-50 tablet, R-0, E-Prescribe      HYDROcodone-acetaminophen (NORCO) 5-325 MG tablet Take 1-2 tablets by mouth every 4 hours as needed for moderate to severe pain., Disp-10 tablet, R-0,  E-Prescribe      ibuprofen (ADVIL/MOTRIN) 600 MG tablet Take 1 tablet (600 mg) by mouth every 6 hours as needed for other (mild and/or inflammatory pain)., Disp-30 tablet, R-0, E-Prescribe           CONTINUE these medications which have NOT CHANGED    Details   cetirizine (ZYRTEC) 10 MG tablet Take 10 mg by mouth daily, Historical      losartan-hydrochlorothiazide (HYZAAR) 50-12.5 MG tablet Take 1 tablet by mouth daily at 2 pm., Historical           Allergies   Allergies   Allergen Reactions    Bee Venom Anaphylaxis    Seafood Anaphylaxis    Kiwi [Kiwi Extract] Hives    Sulfa (Sulfonamide Antibiotics) [Sulfa Antibiotics] Unknown    Venom-Honey Bee [Bee Venom] Unknown    Penicillins Rash    Sulfa Antibiotics Rash

## 2025-05-28 ENCOUNTER — TELEPHONE (OUTPATIENT)
Dept: SURGERY | Facility: CLINIC | Age: 35
End: 2025-05-28
Payer: COMMERCIAL

## 2025-05-28 NOTE — TELEPHONE ENCOUNTER
[facility] Post-Op Phone Call                     Surgeon: Ace Arreguin     Date of Surgery: 05/25/25  Surgery: Laparoscopic cholecystectomy   Discharge Date: 05/25/25    Date/Time Called:   Date: 5/28/2025 Time: 9:40 AM   Attempt: First    Attempted to contact patient to check on them post-operatively. Left a voicemail to call back with any questions or concerns.     Thank you for your time. Please do not hesitate to call us with any questions or concerns.    Call completed by: Susan Brenner RN

## 2025-06-04 LAB
PATH REPORT.COMMENTS IMP SPEC: NORMAL
PATH REPORT.COMMENTS IMP SPEC: NORMAL
PATH REPORT.FINAL DX SPEC: NORMAL
PATH REPORT.GROSS SPEC: NORMAL
PATH REPORT.MICROSCOPIC SPEC OTHER STN: NORMAL
PATH REPORT.RELEVANT HX SPEC: NORMAL
PHOTO IMAGE: NORMAL

## (undated) DEVICE — SOL WATER IRRIG 1000ML BOTTLE 2F7114

## (undated) DEVICE — BLADE KNIFE SURG 11 371111

## (undated) DEVICE — PLATE GROUNDING ADULT W/CORD 9165L

## (undated) DEVICE — CUSTOM PACK LAP CHOLE SBA5BLCHEA

## (undated) DEVICE — SUCTION MITY VAC MUSHROOM CUP 10007LP

## (undated) DEVICE — Device

## (undated) DEVICE — SU VICRYL+ 0 27 UR6 VLT VCP603H

## (undated) DEVICE — SUTURE VICRYL+ 0 36IN CT-1 UND VCP946H

## (undated) DEVICE — UNDERPAD 30X30 BULK 949B10

## (undated) DEVICE — SUTURE MONOCRYL+ 4-0 PS-2 27IN MCP426H

## (undated) DEVICE — LINER PRINTED RED HAZARD 24X24 A4824PRRO

## (undated) DEVICE — DRSG TEGADERM 2 3/8X2 3/4" 1624W

## (undated) DEVICE — SURGICEL POWDER ABSORBABLE HEMOSTAT 3GM 3013SP

## (undated) DEVICE — SUCTION STRYKERFLOW II 250-070-500

## (undated) DEVICE — DRESSING MEPILEX BORDER POST-OP 4X12

## (undated) DEVICE — PREP CHLORAPREP 26ML TINTED HI-LITE ORANGE 930815

## (undated) DEVICE — ENDO SHEARS RENEW LAP ENDOCUT SCISSOR TIP 16.5MM 3142

## (undated) DEVICE — SOL NACL 0.9% IRRIG 1000ML BOTTLE 2F7124

## (undated) DEVICE — SUCTION MANIFOLD NEPTUNE 2 SYS 1 PORT 702-025-000

## (undated) DEVICE — ANTIFOG SOLUTION SEE SHARP 150M TROCAR SWABS 30978 (COI)

## (undated) DEVICE — SU PLAIN 0 TIE 54" S104H

## (undated) DEVICE — ENDO TROCAR FIRST ENTRY KII FIOS Z-THRD 11X100MM CTF33

## (undated) DEVICE — CUSTOM CATH LAB BASIN SET PACK 640PACK LHE SUTCNBPFCA

## (undated) DEVICE — PREP DURAPREP 26ML APL 8630

## (undated) DEVICE — SU MONOCRYL+ 4-0 18IN PS2 UND MCP496G

## (undated) DEVICE — DRAPE IOBAN 2 C-SECTION 3M 6697

## (undated) DEVICE — TUBING SMOKE EVAC PNEUMOCLEAR HIGH FLOW 0620050250

## (undated) DEVICE — PACK MINOR SINGLE BASIN SSK3001

## (undated) DEVICE — GOWN IMPERVIOUS BREATHABLE SMART LG 89015

## (undated) DEVICE — ADH SKIN CLOSURE PREMIERPRO EXOFIN 1.0ML 3470

## (undated) DEVICE — PACK MAJOR BASIN 673

## (undated) DEVICE — ESU GROUND PAD ADULT REM W/15' CORD E7507DB

## (undated) DEVICE — DRSG TELFA 3X4" 1050

## (undated) DEVICE — GLOVE BIOGEL PI ULTRATOUCH G SZ 6.5 42165

## (undated) DEVICE — DRSG PRIMAPORE 02X3"

## (undated) DEVICE — CLIP APPLIER ENDO ROTATING 10MM MED/LG ER320

## (undated) DEVICE — GOWN XXL 9575

## (undated) DEVICE — WRAPPER STERILE QC 200 18X18

## (undated) DEVICE — CUSTOM PACK C-SECTION LHE

## (undated) DEVICE — PAD INSERT MED PEACH 14X6.5 62550

## (undated) DEVICE — SU DERMABOND ADVANCED .7ML DNX12

## (undated) DEVICE — ENDO TROCAR SLEEVE KII Z-THREADED 05X100MM CTS02

## (undated) DEVICE — VIAL DECANTER STERILE WHITE DYNJDEC06

## (undated) DEVICE — CATH SUCTION 10FR W/TRAP DYND44110

## (undated) DEVICE — SUTURE MONOCRYL+ 0 CT-1 UND 18 MCP946H

## (undated) DEVICE — SU VICRYL 3-0 CT-2 27" UND J232H

## (undated) DEVICE — SUTURE PLAIN 2-0 CTX 872H

## (undated) DEVICE — BASIN EMESIS STERILE  SSK9005A

## (undated) DEVICE — ENDO TROCAR FIRST ENTRY KII FIOS Z-THRD 05X100MM CTF03

## (undated) DEVICE — SU VICRYL 3-0 CT-1 27" UND J258H

## (undated) DEVICE — GLOVE BIOGEL PI ULTRATOUCH G SZ 8.0 42180

## (undated) DEVICE — SOL RINGERS LACTATED 1000ML BAG 2B2324X

## (undated) DEVICE — ESU LIGASURE OPEN SEALER/DIVIDER SM JAW 16.5MM LF1212A

## (undated) DEVICE — GOWN IMPERVIOUS ZONED LG

## (undated) RX ORDER — BUPIVACAINE HCL/EPINEPHRINE 0.25-.0005
VIAL (ML) INJECTION
Status: DISPENSED
Start: 2025-05-25

## (undated) RX ORDER — ONDANSETRON 2 MG/ML
INJECTION INTRAMUSCULAR; INTRAVENOUS
Status: DISPENSED
Start: 2023-10-05

## (undated) RX ORDER — PROPOFOL 10 MG/ML
INJECTION, EMULSION INTRAVENOUS
Status: DISPENSED
Start: 2025-05-25

## (undated) RX ORDER — FENTANYL CITRATE 50 UG/ML
INJECTION, SOLUTION INTRAMUSCULAR; INTRAVENOUS
Status: DISPENSED
Start: 2025-05-25

## (undated) RX ORDER — MORPHINE SULFATE 1 MG/ML
INJECTION, SOLUTION EPIDURAL; INTRATHECAL; INTRAVENOUS
Status: DISPENSED
Start: 2023-10-05

## (undated) RX ORDER — FENTANYL CITRATE 50 UG/ML
INJECTION, SOLUTION INTRAMUSCULAR; INTRAVENOUS
Status: DISPENSED
Start: 2023-10-05